# Patient Record
Sex: MALE | Race: BLACK OR AFRICAN AMERICAN | Employment: OTHER | URBAN - METROPOLITAN AREA
[De-identification: names, ages, dates, MRNs, and addresses within clinical notes are randomized per-mention and may not be internally consistent; named-entity substitution may affect disease eponyms.]

---

## 2017-01-03 ENCOUNTER — OFFICE VISIT (OUTPATIENT)
Dept: CARDIOLOGY CLINIC | Age: 57
End: 2017-01-03

## 2017-01-03 ENCOUNTER — NURSE NAVIGATOR (OUTPATIENT)
Dept: CARDIOLOGY CLINIC | Age: 57
End: 2017-01-03

## 2017-01-03 VITALS
OXYGEN SATURATION: 98 % | WEIGHT: 264 LBS | BODY MASS INDEX: 36.96 KG/M2 | SYSTOLIC BLOOD PRESSURE: 138 MMHG | HEART RATE: 68 BPM | DIASTOLIC BLOOD PRESSURE: 80 MMHG | HEIGHT: 71 IN

## 2017-01-03 DIAGNOSIS — I50.42 CHRONIC COMBINED SYSTOLIC AND DIASTOLIC CONGESTIVE HEART FAILURE (HCC): ICD-10-CM

## 2017-01-03 DIAGNOSIS — I10 ESSENTIAL HYPERTENSION: Primary | ICD-10-CM

## 2017-01-03 DIAGNOSIS — I48.19 PERSISTENT ATRIAL FIBRILLATION (HCC): ICD-10-CM

## 2017-01-03 DIAGNOSIS — Z72.0 TOBACCO USE: ICD-10-CM

## 2017-01-03 RX ORDER — CARVEDILOL 25 MG/1
25 TABLET ORAL EVERY 12 HOURS
Qty: 60 TAB | Refills: 11 | Status: SHIPPED | OUTPATIENT
Start: 2017-01-03 | End: 2018-01-15 | Stop reason: SDUPTHER

## 2017-01-03 RX ORDER — FUROSEMIDE 40 MG/1
40 TABLET ORAL DAILY
Qty: 45 TAB | Refills: 8 | Status: SHIPPED | OUTPATIENT
Start: 2017-01-03 | End: 2018-01-15 | Stop reason: SDUPTHER

## 2017-01-03 RX ORDER — LISINOPRIL 10 MG/1
10 TABLET ORAL DAILY
Qty: 30 TAB | Refills: 11 | Status: SHIPPED | OUTPATIENT
Start: 2017-01-03 | End: 2018-01-15 | Stop reason: SDUPTHER

## 2017-01-03 NOTE — PROGRESS NOTES
NNTOCIP    Transitions of Care Follow Up      Mr. Desirae Mullins was hospitalized at [facility] 11/18-11/23/16 for CHF, A fib, HTN and discharged to home. Goals met.  Episode closed:  No hospitalization or ED visit 30 days from discharge on 11/23/16.

## 2017-01-03 NOTE — MR AVS SNAPSHOT
Visit Information Date & Time Provider Department Dept. Phone Encounter #  
 1/3/2017 11:30 AM Hamida Steele NP [facility] [id number] Your Appointments 2/1/2017  9:00 AM  
Follow Up with Sana Farrar MD  
[facility] ([facility]) Appt Note: PH follow-up; hosp in late Nov. 2016 for CHF  
 [facility] [address] [address] [address] [phone] [facility] [address] Upcoming Health Maintenance Date Due Hepatitis C Screening 1960 DTaP/Tdap/Td series (1 - Tdap) 6/4/1980 FOBT Q 1 YEAR AGE 50-75 6/4/2009 INFLUENZA AGE 9 TO ADULT 8/1/2016 Allergies as of 1/3/2017  Review Complete On: 1/3/2017 By: Hamida Steele NP No Known Allergies Current Immunizations  Never Reviewed Name Date Pneumococcal Polysaccharide (PPSV-23) 5/9/2015  1:45 PM  
  
 Not reviewed this visit You Were Diagnosed With   
  
 Codes Comments Essential hypertension    -  Primary ICD-10-CM: I10 
ICD-9-CM: 401.9 Vitals BP Pulse Height(growth percentile) Weight(growth percentile) SpO2 BMI  
 138/80 (BP 1 Location: Left arm, BP Patient Position: Sitting) 68 5' 11\" (1.803 m) 264 lb (119.7 kg) 98% 36.82 kg/m2 Smoking Status Current Every Day Smoker Vitals History BMI and BSA Data Body Mass Index Body Surface Area  
 36.82 kg/m 2 2.45 m 2 Preferred Pharmacy Pharmacy Name Phone [id number] [address] [phone] Your Updated Medication List  
  
   
This list is accurate as of: 1/3/17 12:13 PM.  Always use your most recent med list.  
  
  
  
  
 carvedilol 25 mg tablet Commonly known as:  Alvarado Servant Take 1 Tab by mouth every twelve (12) hours. Indications: Chronic Heart Failure  
  
 furosemide 40 mg tablet Commonly known as:  LASIX Take 1 Tab by mouth daily. Or as directed  
  
 lisinopril 10 mg tablet Commonly known as:  Jaquez Raymond Take 1 Tab by mouth daily. methocarbamol 500 mg tablet Commonly known as:  ROBAXIN Take 1 Tab by mouth four (4) times daily. nitroglycerin 0.4 mg SL tablet Commonly known as:  NITROSTAT  
1 Tab by SubLINGual route every five (5) minutes as needed for Chest Pain. XARELTO 20 mg Tab tablet Generic drug:  rivaroxaban TAKE ONE TABLET BY MOUTH ONCE DAILY Patient Instructions Continue present medication regimen Follow-up with Dr. Sandi Madrid as scheduled and as needed Weigh daily and record Limit sodium intake to 2000mg per day Limit fluid intake to no more than  6, eight ounce glasses of any type of fluids per day Call if you notice sudden or progressive weight gain (3-5 pounds in 2-3 days), increasing shortness of breath, abdominal bloating, increasing lower extremity edema, inability to lie flat or on your normal number of pillows, having to sit up to catch your breath, fatigue, increased somnolence (sleeping more), poor appetite Heart Failure: Care Instructions Your Care Instructions Heart failure occurs when your heart does not pump as much blood as the body needs. Failure does not mean that the heart has stopped pumping but rather that it is not pumping as well as it should. Over time, this causes fluid buildup in your lungs and other parts of your body. Fluid buildup can cause shortness of breath, fatigue, swollen ankles, and other problems. By taking medicines regularly, reducing sodium (salt) in your diet, checking your weight every day, and making lifestyle changes, you can feel better and live longer. Follow-up care is a key part of your treatment and safety. Be sure to make and go to all appointments, and call your doctor if you are having problems.  It's also a good idea to know your test results and keep a list of the medicines you take. How can you care for yourself at home? Medicines · Be safe with medicines. Take your medicines exactly as prescribed. Call your doctor if you think you are having a problem with your medicine. · Do not take any vitamins, over-the-counter medicine, or herbal products without talking to your doctor first. Angela Duff not take ibuprofen (Advil or Motrin) and naproxen (Aleve) without talking to your doctor first. They could make your heart failure worse. · You may be taking some of the following medicine. ¨ Beta-blockers can slow heart rate, decrease blood pressure, and improve your condition. Taking a beta-blocker may lower your chance of needing to be hospitalized. ¨ Angiotensin-converting enzyme inhibitors (ACEIs) reduce the heart's workload, lower blood pressure, and reduce swelling. Taking an ACEI may lower your chance of needing to be hospitalized again. ¨ Angiotensin II receptor blockers (ARBs) work like ACEIs. Your doctor may prescribe them instead of ACEIs. ¨ Diuretics, also called water pills, reduce swelling. ¨ Potassium supplements replace this important mineral, which is sometimes lost with diuretics. ¨ Aspirin and other blood thinners prevent blood clots, which can cause a stroke or heart attack. You will get more details on the specific medicines your doctor prescribes. Diet · Your doctor may suggest that you limit sodium to 2,000 milligrams (mg) a day or less. That is less than 1 teaspoon of salt a day, including all the salt you eat in cooking or in packaged foods. People get most of their sodium from processed foods. Fast food and restaurant meals also tend to be very high in sodium. · Ask your doctor how much liquid you can drink each day. You may have to limit liquids. Weight · Weigh yourself without clothing at the same time each day. Record your weight. Call your doctor if you gain more than 3 pounds in 2 to 3 days.  A sudden weight gain may mean that your heart failure is getting worse. Activity level · Start light exercise (if your doctor says it is okay). Even if you can only do a small amount, exercise will help you get stronger, have more energy, and manage your weight and your stress. Walking is an easy way to get exercise. Start out by walking a little more than you did before. Bit by bit, increase the amount you walk. · When you exercise, watch for signs that your heart is working too hard. You are pushing yourself too hard if you cannot talk while you are exercising. If you become short of breath or dizzy or have chest pain, stop, sit down, and rest. 
· If you feel \"wiped out\" the day after you exercise, walk slower or for a shorter distance until you can work up to a better pace. · Get enough rest at night. Sleeping with 1 or 2 pillows under your upper body and head may help you breathe easier. Lifestyle changes · Do not smoke. Smoking can make a heart condition worse. If you need help quitting, talk to your doctor about stop-smoking programs and medicines. These can increase your chances of quitting for good. Quitting smoking may be the most important step you can take to protect your heart. · Limit alcohol to 2 drinks a day for men and 1 drink a day for women. Too much alcohol can cause health problems. · Avoid getting sick from colds and the flu. Get a pneumococcal vaccine shot. If you have had one before, ask your doctor whether you need another dose. Get a flu shot each year. If you must be around people with colds or the flu, wash your hands often. When should you call for help? Call 911 if you have symptoms of sudden heart failure such as: 
· You have severe trouble breathing. · You cough up pink, foamy mucus. · You have a new irregular or rapid heartbeat. Call your doctor now or seek immediate medical care if: 
· You have new or increased shortness of breath. · You are dizzy or lightheaded, or you feel like you may faint. · You have sudden weight gain, such as 3 pounds or more in 2 to 3 days. · You have increased swelling in your legs, ankles, or feet. · You are suddenly so tired or weak that you cannot do your usual activities. Watch closely for changes in your health, and be sure to contact your doctor if: 
· You develop new symptoms. Where can you learn more? Go to http://aixa-lizet.info/. Enter F337 in the search box to learn more about \"Heart Failure: Care Instructions. \" Current as of: January 27, 2016 Content Version: 11.1 © 9448-3474 [employer]. Care instructions adapted under license by [employer] (which disclaims liability or warranty for this information). If you have questions about a medical condition or this instruction, always ask your healthcare professional. [place] [address] any warranty or liability for your use of this information. Limiting Sodium and Fluids With Heart Failure: Care Instructions Your Care Instructions Sodium causes your body to keep extra water, making it harder for your heart to pump. By limiting sodium, you will feel better and lower your risk of having to go to the hospital. 
People get most of their sodium from processed foods. Fast food and restaurant meals also tend to be very high in sodium. Your doctor may suggest that you limit sodium to 2,000 milligrams (mg) a day or less. That is less than 1 teaspoon of salt a day, including all the salt you eat in cooked or packaged foods. Usually, you have to limit the amount of liquids you drink only if your heart failure is severe. Limiting sodium alone often is enough to help your body get rid of extra fluids. However, your doctor may tell you to limit your fluid intake to a set amount each day. Follow-up care is a key part of your treatment and safety.  Be sure to make and go to all appointments, and call your doctor if you are having problems. It's also a good idea to know your test results and keep a list of the medicines you take. How can you care for yourself at home? Read food labels · Read food labels on cans and food packages. The labels tell you how much sodium is in each serving. Make sure that you look at the serving size. If you eat more than the serving size, you have eaten more sodium than is listed for one serving. · Food labels also tell you the Percent Daily Value. If the Percent Daily Value says 50%, it means that you will get at least 50% of all the sodium you need for the entire day in one serving. Choose products with low Percent Daily Values for sodium. · Be aware that sodium can come in forms other than salt, including monosodium glutamate (MSG), sodium citrate, and sodium bicarbonate (baking soda). MSG is often added to Asian food. You can sometimes ask for food without MSG or salt. Buy low-sodium foods · Buy foods that are labeled \"unsalted\" (no salt added), \"sodium-free\" (less than 5 mg of sodium per serving), or \"low-sodium\" (less than 140 mg of sodium per serving). A food labeled \"light sodium\" has less than half of the full-sodium version of that food. Foods labeled \"reduced-sodium\" may still have too much sodium. · Buy fresh vegetables or plain, frozen vegetables. Buy low-sodium versions of canned vegetables, soups, and other canned goods. Prepare low-sodium meals · Use less salt each day when cooking. Reducing salt in this way will help you adjust to the taste. Do not add salt after cooking. Take the salt shaker off the table. · Flavor your food with garlic, lemon juice, onion, vinegar, herbs, and spices instead of salt. Do not use soy sauce, steak sauce, onion salt, garlic salt, mustard, or ketchup on your food. · Make your own salad dressings, sauces, and ketchup without adding salt. · Use less salt (or none) when recipes call for it. You can often use half the salt a recipe calls for without losing flavor. Other dishes like rice, pasta, and grains do not need added salt. · Rinse canned vegetables. This removes somebut not allof the salt. · Avoid water that has a naturally high sodium content or that has been treated with water softeners, which add sodium. Call your local water company to find out the sodium content of your water supply. If you buy bottled water, read the label and choose a sodium-free brand. Avoid high-sodium foods, such as: 
· Smoked, cured, salted, and canned meat, fish, and poultry. · Ham, dawson, hot dogs, and luncheon meats. · Regular, hard, and processed cheese and regular peanut butter. · Crackers with salted tops. · Frozen prepared meals. · Canned and dried soups, broths, and bouillon, unless labeled sodium-free or low-sodium. · Canned vegetables, unless labeled sodium-free or low-sodium. · Salted snack foods such as chips and pretzels. · Western Lyly fries, pizza, tacos, and other fast foods. · Pickles, olives, ketchup, and other condiments, especially soy sauce, unless labeled sodium-free or low-sodium. If you cannot cook for yourself · Have family members or friends help you, or have someone cook low-sodium meals. · Check with your local senior nutrition program to find out where meals are served and whether they offer a low-sodium option. You can often find these programs through your local health department or hospital. 
· Have meals delivered to your home. Most [facility] have a Meals on [employer]. These programs provide one hot meal a day for older adults, delivered to their homes. Ask whether these meals are low-sodium. Let them know that you are on a low-sodium diet. Limiting fluid intake · Find a method that works for you. You might simply write down how much you drink every time you do.  Some people keep a container filled with the amount of fluid allowed for that day. If they drink from a source other than the container, then they pour out that amount. · Measure your regular drinking glasses to find out how much fluid each one holds. Once you know this, you will not have to measure every time. · Besides water, milk, juices, and other drinks, some foods have a lot of fluid. Count any foods that will melt (such as ice cream or gelatin dessert) or liquid foods (such as soup) as part of your fluid intake for the day. Where can you learn more? Go to http://aixa-lizet.info/. Enter A166 in the search box to learn more about \"Limiting Sodium and Fluids With Heart Failure: Care Instructions. \" Current as of: January 27, 2016 Content Version: 11.1 © 4195-7350 [employer]. Care instructions adapted under license by [employer] (which disclaims liability or warranty for this information). If you have questions about a medical condition or this instruction, always ask your healthcare professional. [place] [address] any warranty or liability for your use of this information. Low Sodium Diet (2,000 Milligram): Care Instructions Your Care Instructions Too much sodium causes your body to hold on to extra water. This can raise your blood pressure and force your heart and kidneys to work harder. In very serious cases, this could cause you to be put in the hospital. It might even be life-threatening. By limiting sodium, you will feel better and lower your risk of serious problems. The most common source of sodium is salt. People get most of the salt in their diet from canned, prepared, and packaged foods. Fast food and restaurant meals also are very high in sodium. Your doctor will probably limit your sodium to less than 2,000 milligrams (mg) a day. This limit counts all the sodium in prepared and packaged foods and any salt you add to your food. Follow-up care is a key part of your treatment and safety. Be sure to make and go to all appointments, and call your doctor if you are having problems. It's also a good idea to know your test results and keep a list of the medicines you take. How can you care for yourself at home? Read food labels · Read labels on cans and food packages. The labels tell you how much sodium is in each serving. Make sure that you look at the serving size. If you eat more than the serving size, you have eaten more sodium. · Food labels also tell you the Percent Daily Value for sodium. Choose products with low Percent Daily Values for sodium. · Be aware that sodium can come in forms other than salt, including monosodium glutamate (MSG), sodium citrate, and sodium bicarbonate (baking soda). MSG is often added to Asian food. When you eat out, you can sometimes ask for food without MSG or added salt. Buy low-sodium foods · Buy foods that are labeled \"unsalted\" (no salt added), \"sodium-free\" (less than 5 mg of sodium per serving), or \"low-sodium\" (less than 140 mg of sodium per serving). Foods labeled \"reduced-sodium\" and \"light sodium\" may still have too much sodium. Be sure to read the label to see how much sodium you are getting. · Buy fresh vegetables, or frozen vegetables without added sauces. Buy low-sodium versions of canned vegetables, soups, and other canned goods. Prepare low-sodium meals · Cut back on the amount of salt you use in cooking. This will help you adjust to the taste. Do not add salt after cooking. One teaspoon of salt has about 2,300 mg of sodium. · Take the salt shaker off the table. · Flavor your food with garlic, lemon juice, onion, vinegar, herbs, and spices. Do not use soy sauce, lite soy sauce, steak sauce, onion salt, garlic salt, celery salt, mustard, or ketchup on your food. · Use low-sodium salad dressings, sauces, and ketchup. Or make your own salad dressings and sauces without adding salt. · Use less salt (or none) when recipes call for it. You can often use half the salt a recipe calls for without losing flavor. Other foods such as rice, pasta, and grains do not need added salt. · Rinse canned vegetables, and cook them in fresh water. This removes somebut not allof the salt. · Avoid water that is naturally high in sodium or that has been treated with water softeners, which add sodium. Call your local water company to find out the sodium content of your water supply. If you buy bottled water, read the label and choose a sodium-free brand. Avoid high-sodium foods · Avoid eating: ¨ Smoked, cured, salted, and canned meat, fish, and poultry. ¨ Ham, dawson, hot dogs, and luncheon meats. ¨ Regular, hard, and processed cheese and regular peanut butter. ¨ Crackers with salted tops, and other salted snack foods such as pretzels, chips, and salted popcorn. ¨ Frozen prepared meals, unless labeled low-sodium. ¨ Canned and dried soups, broths, and bouillon, unless labeled sodium-free or low-sodium. ¨ Canned vegetables, unless labeled sodium-free or low-sodium. ¨ Western Lyly fries, pizza, tacos, and other fast foods. ¨ Pickles, olives, ketchup, and other condiments, especially soy sauce, unless labeled sodium-free or low-sodium. Where can you learn more? Go to http://aixa-lizet.info/. Enter H801 in the search box to learn more about \"Low Sodium Diet (2,000 Milligram): Care Instructions. \" Current as of: July 26, 2016 Content Version: 11.1 © 4235-7136 [employer]. Care instructions adapted under license by Stroz Friedberg (which disclaims liability or warranty for this information). If you have questions about a medical condition or this instruction, always ask your healthcare professional. Loly Pelayo any warranty or liability for your use of this information. Introducing [facility]! Marlene Baltazar introduces Digital Lifeboat patient portal. Now you can access parts of your medical record, email your doctor's office, and request medication refills online. 1. In your internet browser, go to [email or web address] 2. Click on the First Time User? Click Here link in the Sign In box. You will see the New Member Sign Up page. 3. Enter your Digital Lifeboat Access Code exactly as it appears below. You will not need to use this code after youve completed the sign-up process. If you do not sign up before the expiration date, you must request a new code. · Digital Lifeboat Access Code: [email or web address] Expires: 1/25/2017 12:49 PM 
 
4. Enter the last four digits of your Social Security Number (xxxx) and Date of Birth (mm/dd/yyyy) as indicated and click Submit. You will be taken to the next sign-up page. 5. Create a Digital Lifeboat ID. This will be your Digital Lifeboat login ID and cannot be changed, so think of one that is secure and easy to remember. 6. Create a Digital Lifeboat password. You can change your password at any time. 7. Enter your Password Reset Question and Answer. This can be used at a later time if you forget your password. 8. Enter your e-mail address. You will receive e-mail notification when new information is available in [address] 9. Click Sign Up. You can now view and download portions of your medical record. 10. Click the Download Summary menu link to download a portable copy of your medical information. If you have questions, please visit the Frequently Asked Questions section of the Digital Lifeboat website. Remember, Digital Lifeboat is NOT to be used for urgent needs. For medical emergencies, dial 911. Now available from your iPhone and Android! Please provide this summary of care documentation to your next provider. Your primary care clinician is listed as Nilam Martino. If you have any questions after today's visit, please call [phone].

## 2017-01-03 NOTE — PROGRESS NOTES
1. Have you been to the ER, urgent care clinic since your last visit? Hospitalized since your last visit? Yes, on 11/23/16 at [facility]    2. Have you seen or consulted any other health care providers outside of the [address] [place] [address] since your last visit? Include any pap smears or colon screening.  No

## 2017-01-03 NOTE — PATIENT INSTRUCTIONS
Continue present medication regimen  Follow-up with Dr. Haris Gonzalez as scheduled and as needed  Weigh daily and record  Limit sodium intake to 2000mg per day  Limit fluid intake to no more than  6, eight ounce glasses of any type of fluids per day  Call if you notice sudden or progressive weight gain (3-5 pounds in 2-3 days), increasing shortness of breath, abdominal bloating, increasing lower extremity edema, inability to lie flat or on your normal number of pillows, having to sit up to catch your breath, fatigue, increased somnolence (sleeping more), poor appetite      Heart Failure: Care Instructions  Your Care Instructions    Heart failure occurs when your heart does not pump as much blood as the body needs. Failure does not mean that the heart has stopped pumping but rather that it is not pumping as well as it should. Over time, this causes fluid buildup in your lungs and other parts of your body. Fluid buildup can cause shortness of breath, fatigue, swollen ankles, and other problems. By taking medicines regularly, reducing sodium (salt) in your diet, checking your weight every day, and making lifestyle changes, you can feel better and live longer. Follow-up care is a key part of your treatment and safety. Be sure to make and go to all appointments, and call your doctor if you are having problems. It's also a good idea to know your test results and keep a list of the medicines you take. How can you care for yourself at home? Medicines  · Be safe with medicines. Take your medicines exactly as prescribed. Call your doctor if you think you are having a problem with your medicine. · Do not take any vitamins, over-the-counter medicine, or herbal products without talking to your doctor first. Cyrena Hedge not take ibuprofen (Advil or Motrin) and naproxen (Aleve) without talking to your doctor first. They could make your heart failure worse. · You may be taking some of the following medicine.   ¨ Beta-blockers can slow heart rate, decrease blood pressure, and improve your condition. Taking a beta-blocker may lower your chance of needing to be hospitalized. ¨ Angiotensin-converting enzyme inhibitors (ACEIs) reduce the heart's workload, lower blood pressure, and reduce swelling. Taking an ACEI may lower your chance of needing to be hospitalized again. ¨ Angiotensin II receptor blockers (ARBs) work like ACEIs. Your doctor may prescribe them instead of ACEIs. ¨ Diuretics, also called water pills, reduce swelling. ¨ Potassium supplements replace this important mineral, which is sometimes lost with diuretics. ¨ Aspirin and other blood thinners prevent blood clots, which can cause a stroke or heart attack. You will get more details on the specific medicines your doctor prescribes. Diet  · Your doctor may suggest that you limit sodium to 2,000 milligrams (mg) a day or less. That is less than 1 teaspoon of salt a day, including all the salt you eat in cooking or in packaged foods. People get most of their sodium from processed foods. Fast food and restaurant meals also tend to be very high in sodium. · Ask your doctor how much liquid you can drink each day. You may have to limit liquids. Weight  · Weigh yourself without clothing at the same time each day. Record your weight. Call your doctor if you gain more than 3 pounds in 2 to 3 days. A sudden weight gain may mean that your heart failure is getting worse. Activity level  · Start light exercise (if your doctor says it is okay). Even if you can only do a small amount, exercise will help you get stronger, have more energy, and manage your weight and your stress. Walking is an easy way to get exercise. Start out by walking a little more than you did before. Bit by bit, increase the amount you walk. · When you exercise, watch for signs that your heart is working too hard. You are pushing yourself too hard if you cannot talk while you are exercising.  If you become short of breath or dizzy or have chest pain, stop, sit down, and rest.  · If you feel \"wiped out\" the day after you exercise, walk slower or for a shorter distance until you can work up to a better pace. · Get enough rest at night. Sleeping with 1 or 2 pillows under your upper body and head may help you breathe easier. Lifestyle changes  · Do not smoke. Smoking can make a heart condition worse. If you need help quitting, talk to your doctor about stop-smoking programs and medicines. These can increase your chances of quitting for good. Quitting smoking may be the most important step you can take to protect your heart. · Limit alcohol to 2 drinks a day for men and 1 drink a day for women. Too much alcohol can cause health problems. · Avoid getting sick from colds and the flu. Get a pneumococcal vaccine shot. If you have had one before, ask your doctor whether you need another dose. Get a flu shot each year. If you must be around people with colds or the flu, wash your hands often. When should you call for help? Call 911 if you have symptoms of sudden heart failure such as:  · You have severe trouble breathing. · You cough up pink, foamy mucus. · You have a new irregular or rapid heartbeat. Call your doctor now or seek immediate medical care if:  · You have new or increased shortness of breath. · You are dizzy or lightheaded, or you feel like you may faint. · You have sudden weight gain, such as 3 pounds or more in 2 to 3 days. · You have increased swelling in your legs, ankles, or feet. · You are suddenly so tired or weak that you cannot do your usual activities. Watch closely for changes in your health, and be sure to contact your doctor if:  · You develop new symptoms. Where can you learn more? Go to http://aixa-lizet.info/. Enter B192 in the search box to learn more about \"Heart Failure: Care Instructions. \"  Current as of: January 27, 2016  Content Version: 11.1  © 7532-4784 [employer]. Care instructions adapted under license by [employer] (which disclaims liability or warranty for this information). If you have questions about a medical condition or this instruction, always ask your healthcare professional. [address] any warranty or liability for your use of this information. Limiting Sodium and Fluids With Heart Failure: Care Instructions  Your Care Instructions  Sodium causes your body to keep extra water, making it harder for your heart to pump. By limiting sodium, you will feel better and lower your risk of having to go to the hospital.  People get most of their sodium from processed foods. Fast food and restaurant meals also tend to be very high in sodium. Your doctor may suggest that you limit sodium to 2,000 milligrams (mg) a day or less. That is less than 1 teaspoon of salt a day, including all the salt you eat in cooked or packaged foods. Usually, you have to limit the amount of liquids you drink only if your heart failure is severe. Limiting sodium alone often is enough to help your body get rid of extra fluids. However, your doctor may tell you to limit your fluid intake to a set amount each day. Follow-up care is a key part of your treatment and safety. Be sure to make and go to all appointments, and call your doctor if you are having problems. It's also a good idea to know your test results and keep a list of the medicines you take. How can you care for yourself at home? Read food labels  · Read food labels on cans and food packages. The labels tell you how much sodium is in each serving. Make sure that you look at the serving size. If you eat more than the serving size, you have eaten more sodium than is listed for one serving. · Food labels also tell you the Percent Daily Value. If the Percent Daily Value says 50%, it means that you will get at least 50% of all the sodium you need for the entire day in one serving.  Choose products with low Percent Daily Values for sodium. · Be aware that sodium can come in forms other than salt, including monosodium glutamate (MSG), sodium citrate, and sodium bicarbonate (baking soda). MSG is often added to Asian food. You can sometimes ask for food without MSG or salt. Buy low-sodium foods  · Buy foods that are labeled \"unsalted\" (no salt added), \"sodium-free\" (less than 5 mg of sodium per serving), or \"low-sodium\" (less than 140 mg of sodium per serving). A food labeled \"light sodium\" has less than half of the full-sodium version of that food. Foods labeled \"reduced-sodium\" may still have too much sodium. · Buy fresh vegetables or plain, frozen vegetables. Buy low-sodium versions of canned vegetables, soups, and other canned goods. Prepare low-sodium meals  · Use less salt each day when cooking. Reducing salt in this way will help you adjust to the taste. Do not add salt after cooking. Take the salt shaker off the table. · Flavor your food with garlic, lemon juice, onion, vinegar, herbs, and spices instead of salt. Do not use soy sauce, steak sauce, onion salt, garlic salt, mustard, or ketchup on your food. · Make your own salad dressings, sauces, and ketchup without adding salt. · Use less salt (or none) when recipes call for it. You can often use half the salt a recipe calls for without losing flavor. Other dishes like rice, pasta, and grains do not need added salt. · Rinse canned vegetables. This removes somebut not allof the salt. · Avoid water that has a naturally high sodium content or that has been treated with water softeners, which add sodium. Call your local water company to find out the sodium content of your water supply. If you buy bottled water, read the label and choose a sodium-free brand. Avoid high-sodium foods, such as:  · Smoked, cured, salted, and canned meat, fish, and poultry. · Ham, dawson, hot dogs, and luncheon meats.   · Regular, hard, and processed cheese and regular peanut butter. · Crackers with salted tops. · Frozen prepared meals. · Canned and dried soups, broths, and bouillon, unless labeled sodium-free or low-sodium. · Canned vegetables, unless labeled sodium-free or low-sodium. · Salted snack foods such as chips and pretzels. · Western [place] fries, pizza, tacos, and other fast foods. · Pickles, olives, ketchup, and other condiments, especially soy sauce, unless labeled sodium-free or low-sodium. If you cannot cook for yourself  · Have family members or friends help you, or have someone cook low-sodium meals. · Check with your local senior nutrition program to find out where meals are served and whether they offer a low-sodium option. You can often find these programs through your local health department or hospital.  · Have meals delivered to your home. Most [facility] have a [facility] [place]. These programs provide one hot meal a day for older adults, delivered to their homes. Ask whether these meals are low-sodium. Let them know that you are on a low-sodium diet. Limiting fluid intake  · Find a method that works for you. You might simply write down how much you drink every time you do. Some people keep a container filled with the amount of fluid allowed for that day. If they drink from a source other than the container, then they pour out that amount. · Measure your regular drinking glasses to find out how much fluid each one holds. Once you know this, you will not have to measure every time. · Besides water, milk, juices, and other drinks, some foods have a lot of fluid. Count any foods that will melt (such as ice cream or gelatin dessert) or liquid foods (such as soup) as part of your fluid intake for the day. Where can you learn more? Go to http://aixa-lizet.info/. Enter A166 in the search box to learn more about \"Limiting Sodium and Fluids With Heart Failure: Care Instructions. \"  Current as of: January 27, 2016  Content Version: 11.1  © 5980-2192 [email or web address]. Care instructions adapted under license by [employer] (which disclaims liability or warranty for this information). If you have questions about a medical condition or this instruction, always ask your healthcare professional. [address] any warranty or liability for your use of this information. Low Sodium Diet (2,000 Milligram): Care Instructions  Your Care Instructions  Too much sodium causes your body to hold on to extra water. This can raise your blood pressure and force your heart and kidneys to work harder. In very serious cases, this could cause you to be put in the hospital. It might even be life-threatening. By limiting sodium, you will feel better and lower your risk of serious problems. The most common source of sodium is salt. People get most of the salt in their diet from canned, prepared, and packaged foods. Fast food and restaurant meals also are very high in sodium. Your doctor will probably limit your sodium to less than 2,000 milligrams (mg) a day. This limit counts all the sodium in prepared and packaged foods and any salt you add to your food. Follow-up care is a key part of your treatment and safety. Be sure to make and go to all appointments, and call your doctor if you are having problems. It's also a good idea to know your test results and keep a list of the medicines you take. How can you care for yourself at home? Read food labels  · Read labels on cans and food packages. The labels tell you how much sodium is in each serving. Make sure that you look at the serving size. If you eat more than the serving size, you have eaten more sodium. · Food labels also tell you the Percent Daily Value for sodium. Choose products with low Percent Daily Values for sodium.   · Be aware that sodium can come in forms other than salt, including monosodium glutamate (MSG), sodium citrate, and sodium bicarbonate (baking soda). MSG is often added to Asian food. When you eat out, you can sometimes ask for food without MSG or added salt. Buy low-sodium foods  · Buy foods that are labeled \"unsalted\" (no salt added), \"sodium-free\" (less than 5 mg of sodium per serving), or \"low-sodium\" (less than 140 mg of sodium per serving). Foods labeled \"reduced-sodium\" and \"light sodium\" may still have too much sodium. Be sure to read the label to see how much sodium you are getting. · Buy fresh vegetables, or frozen vegetables without added sauces. Buy low-sodium versions of canned vegetables, soups, and other canned goods. Prepare low-sodium meals  · Cut back on the amount of salt you use in cooking. This will help you adjust to the taste. Do not add salt after cooking. One teaspoon of salt has about 2,300 mg of sodium. · Take the salt shaker off the table. · Flavor your food with garlic, lemon juice, onion, vinegar, herbs, and spices. Do not use soy sauce, lite soy sauce, steak sauce, onion salt, garlic salt, celery salt, mustard, or ketchup on your food. · Use low-sodium salad dressings, sauces, and ketchup. Or make your own salad dressings and sauces without adding salt. · Use less salt (or none) when recipes call for it. You can often use half the salt a recipe calls for without losing flavor. Other foods such as rice, pasta, and grains do not need added salt. · Rinse canned vegetables, and cook them in fresh water. This removes somebut not allof the salt. · Avoid water that is naturally high in sodium or that has been treated with water softeners, which add sodium. Call your local water company to find out the sodium content of your water supply. If you buy bottled water, read the label and choose a sodium-free brand. Avoid high-sodium foods  · Avoid eating:  ¨ Smoked, cured, salted, and canned meat, fish, and poultry. ¨ Ham, dawson, hot dogs, and luncheon meats.   ¨ Regular, hard, and processed cheese and regular peanut butter. ¨ Crackers with salted tops, and other salted snack foods such as pretzels, chips, and salted popcorn. ¨ Frozen prepared meals, unless labeled low-sodium. ¨ Canned and dried soups, broths, and bouillon, unless labeled sodium-free or low-sodium. ¨ Canned vegetables, unless labeled sodium-free or low-sodium. ¨ Western Lyly fries, pizza, tacos, and other fast foods. ¨ Pickles, olives, ketchup, and other condiments, especially soy sauce, unless labeled sodium-free or low-sodium. Where can you learn more? Go to http://aixa-lizet.info/. Enter Q532 in the search box to learn more about \"Low Sodium Diet (2,000 Milligram): Care Instructions. \"  Current as of: July 26, 2016  Content Version: 11.1  © 5678-9115 [employer]. Care instructions adapted under license by [employer] (which disclaims liability or warranty for this information). If you have questions about a medical condition or this instruction, always ask your healthcare professional. [place] [address] any warranty or liability for your use of this information.

## 2017-01-03 NOTE — PROGRESS NOTES
Viri Mckeon presents today for post hospital follow-up of CHF. He was admitted after presenting with complaints of chest pain and shortness of breath that has been occurring for about 4 weeks prior to admission. He also complained of orthopnea and PND. During this most recent hospitalization, there w were concerns regarding compliance with his diet and his medications. He has not been a candidate for Coumadin because of the compliance issues. He is a 61year old male with history of hypertension, CAD (s/p cath in 2008, showing non-critical disease of the left circumflex), tobacco abuse,chronic atrial fibrillation, diastolic heart failure, and DJD with bilateral hip replacements . He also has obstructive sleep apnea for which he uses CPAP. He also admits to daily marijuana use. He continues to smoke an occasional cigar and he states that he has not been drinking any alcohol. He denies chest pain, tightness, heaviness, and palpitations. He admits to sharp shooting chest pain that lasts for seconds and this occurs intermittently throughout the day. It does not radiate nor is it accompanied by shortness of breath, nausea, or diaphoresis. He denies shortness of breath at rest, admits to dyspnea on exertion, orthopnea and admits to occasional PND. He denies abdominal bloating. He denies lightheadedness, dizziness, and syncope. He denies lower extremity edema and claudication. Denies nausea, vomiting, diarrhea, fever, chills. He states that he has been compliant with his medication regimen since discharge from the hospital but admits that he has been drinking lots of fluids. PMH:  Past Medical History   Diagnosis Date    Atrial fibrillation ([address] 5/6/15     LV EF 50% (may 7998)    Diastolic HF (heart failure) ([facility])      LV EF 50% (echo may 2015)    History of echocardiogram 05/07/2015     LVE. EF 50%. No WMA. Mod conc LVH. Indeterminate diastolic fx. Mild RVE.   RVSP at least 37 mmHg.  Severe LAE. Mod ROSEMARIE. Mod MR. Marked AV calcification vs vegetation. Mod TR.  Hypertension     Tobacco use        PSH:  Past Surgical History   Procedure Laterality Date    Hx hip replacement Bilateral      right in March 2013, left May 2013    Hx hernia repair         MEDS:  Current Outpatient Prescriptions   Medication Sig    carvedilol (COREG) 25 mg tablet Take 1 Tab by mouth every twelve (12) hours. Indications: Chronic Heart Failure    rivaroxaban (XARELTO) 20 mg tab tablet TAKE ONE TABLET BY MOUTH ONCE DAILY    furosemide (LASIX) 40 mg tablet Take 1 Tab by mouth daily. Or as directed    lisinopril (PRINIVIL, ZESTRIL) 10 mg tablet Take 1 Tab by mouth daily.  methocarbamol (ROBAXIN) 500 mg tablet Take 1 Tab by mouth four (4) times daily.  nitroglycerin (NITROSTAT) 0.4 mg SL tablet 1 Tab by SubLINGual route every five (5) minutes as needed for Chest Pain. No current facility-administered medications for this visit. Allergies and Sensitivities:  No Known Allergies    Family History:  Family History   Problem Relation Age of Onset    Heart Failure Mother     Obesity Mother     Diabetes Father        Social History:  He  reports that he has been smoking. He has a 20.00 pack-year smoking history. He does not have any smokeless tobacco history on file. He  reports that he does not drink alcohol. Physical:  Visit Vitals    /80 (BP 1 Location: Left arm, BP Patient Position: Sitting)    Pulse 68    Ht 5' 11\" (1.803 m)    Wt 119.7 kg (264 lb)    SpO2 98%    BMI 36.82 kg/m2         Exam:  Neck:  Supple, no JVD, no carotid bruits  CV:  Normal S1 and  S2, grade II/VI ALEJANDRA noted, no rubs, or gallops noted. Irregularly, irregular rhythm. Lungs:  Clear to ausculation throughout, no wheezes or rales  Abd:  Soft, non-tender, non-distended with good bowel sounds.   No hepatosplenomegaly  Extremities:  No edema      Data:  EKG:  Atrial fibrillation w/controlled ventricular response around 70. Nonspecific inferior & high lateral ST-T changes      LABS:  Lab Results   Component Value Date/Time    Sodium 141 11/23/2016 11:59 AM    Potassium 4.8 11/23/2016 11:59 AM    Chloride 105 11/23/2016 11:59 AM    CO2 30 11/23/2016 11:59 AM    Glucose 93 11/23/2016 11:59 AM    BUN 16 11/23/2016 11:59 AM    Creatinine 0.93 11/23/2016 11:59 AM     Lab Results   Component Value Date/Time    Cholesterol, total 128 05/06/2015 07:52 AM    HDL Cholesterol 35 05/06/2015 07:52 AM    LDL, calculated 73.2 05/06/2015 07:52 AM    Triglyceride 99 05/06/2015 07:52 AM    CHOL/HDL Ratio 3.7 05/06/2015 07:52 AM     Lab Results   Component Value Date/Time    ALT 29 11/18/2016 12:40 PM       Impression / Plan:  1. Hypertension, blood pressure stable  2. Atrial fibrillation, rate controlled and currently anticoagulated with Xarelto  3. Chronic systolic and diastolic CHF, appears compensated. EF by echo was 45-50%  4. CAD, history of abnormal stress tests and cath in 2008 showed non-critical disease of the circumflex  5. Tobacco abuse and recreational drug use (marijuana)    Mr. Dmitry Plaza was seen today for follow-up after he was hospitalized from November 18-23, 2016. He was diagnosed with acute on chronic systolic and diastolic heart failure secondary to noncompliance with diet and medications. He apparently had run out of his medications and he also was not taking his anticoagulation. His NT pro-BNP on admission was 3407. He was given Lasix and restarted on his medications and improvement was noted. He states that since being discharged, he has been compliant with his medication regimen. However, he has not been compliant with fluid restriction. He states that he was told that he could have up to 64 ounces per day but I asked that he decrease this to 48-56 ounces per day. His blood pressure stable as is his heart rate.   He remains in atrial fibrillation and he states that he has been taking his Xarelto. His breath sounds are clear and he does not exhibit any signs of volume overload at this time. He asked if there is another anticoagulant that is not as expensive and we discussed Coumadin therapy. He is aware that if Coumadin is restarted, he would have to come to the office to have his INRs checked and he is also aware that he would have to be more careful with his dietary intake of vitamin K rich foods. After we discussed this, he decided that he would rather stay on the Xarelto and he was given a savings card that he was asked to try to activate to see if he can get assistance with the monetary cost of the medication. An echo done during this admission showed an ejection fraction of 45-50%, no obvious wall motion abnormalities, and RVSP was mildly elevated at 36 mmHg. Also noted was moderate MR. When compared to the echo done in May 2015, there is no significant change. CT scan of the chest was done and it showed no overt pulmonary edema. CTA of the chest was also done which showed the possibility of 2 or 3 small caliber nonocclusive PE. However, it may have also been artifactual.    Congestive heart failure teaching reinforced today. Advised to limit sodium intake to no more than 2000mg per day and to also watch fluid intake. Advised to weigh daily every morning and record weights. Instructed to call our office if progressive weight gain is noted over a 2 to 3 day period of time, shortness of breath increases, or if abdominal bloating, nausea, fatigue, or increased lower extremity edema is noted. Patient education material regarding CHF, low-sodium diet, and sodium and fluid restrictions attached his after visit summary. The importance of compliance with his medication regimen and dietary and fluid restrictions discussed at length. He verbalizes understanding of the discussion. Smoking cessation discussed and highly encouraged.   He was also encouraged to discontinue use of marijuana. He states that he has not had any problems with over indulging with alcohol and he states that he has not had any alcohol since he was discharged home. He will follow-up with Dr. Ajith Ge as scheduled and as needed.         Martínez JAMESON, FNP-BC

## 2017-01-18 ENCOUNTER — TELEPHONE (OUTPATIENT)
Dept: CARDIAC REHAB | Age: 57
End: 2017-01-18

## 2017-01-18 NOTE — TELEPHONE ENCOUNTER
[facility] called and left a message on patients voicemail. Additional attempts to contact patient will be made.     Thank you,  Elieser Perales

## 2017-02-02 ENCOUNTER — OFFICE VISIT (OUTPATIENT)
Dept: CARDIOLOGY CLINIC | Age: 57
End: 2017-02-02

## 2017-02-02 VITALS
SYSTOLIC BLOOD PRESSURE: 130 MMHG | HEART RATE: 72 BPM | DIASTOLIC BLOOD PRESSURE: 40 MMHG | HEIGHT: 71 IN | BODY MASS INDEX: 38.22 KG/M2 | OXYGEN SATURATION: 86 % | WEIGHT: 273 LBS

## 2017-02-02 DIAGNOSIS — I48.19 PERSISTENT ATRIAL FIBRILLATION (HCC): Primary | ICD-10-CM

## 2017-02-02 DIAGNOSIS — I50.9 CONGESTIVE HEART FAILURE, UNSPECIFIED CONGESTIVE HEART FAILURE CHRONICITY, UNSPECIFIED CONGESTIVE HEART FAILURE TYPE: ICD-10-CM

## 2017-02-02 NOTE — PROGRESS NOTES
PATIENT NAME: Renetta Rossi         61 y.o.      1960              DATE:2/2/2017    REASON FOR VISIT: Congestive heart failure    HISTORY OF PRESENT ILLNESS: Carrying the diagnosis of congestive heart failure mainly on the basis of diastolic CHF. His ejection fraction however is at the lower limits of normal.  The patient is in atrial fibrillation chronically. Has a history of nonobstructive coronary artery disease. Has a history of poor compliance with his medical regimen. Coumadin contraindicated because of compliance issues. The patient was not able to afford Xarelto. He is presently taking aspirin 81 mg daily. Chronically short of breath on exertion. This has not changed. Denies orthopnea and paroxysmal nocturnal dyspnea. Denies chest pain. Denies syncope, presyncope. Denies palpitation. Denies edema in the lower extremities. PAST MEDICAL HISTORY:   Past Medical History:    Atrial fibrillation ([address]                       5/6/15          Comment:LV EF 50% (may 2864)    Diastolic HF (heart failure) ([facility])                              Comment:LV EF 50% (echo may 2015)    History of echocardiogram                       05/07/2015      Comment:LVE. EF 50%. No WMA. Mod conc LVH. Indeterminate diastolic fx. Mild RVE. RVSP at               least 37 mmHg. Severe LAE. Mod ROSEMARIE. Mod MR. Marked AV calcification vs vegetation. Mod TR.     Hypertension                                                  Tobacco use                                                   PAST SURGICAL HISTORY:   Past Surgical History:    HX HIP REPLACEMENT                              Bilateral                 Comment:right in March 2013, left May 2013    HX HERNIA REPAIR                                                 SOCIAL HISTORY:  Social History    Marital status: UNKNOWN             Spouse name:                       Years of education:                 Number of children: Social History Main Topics    Smoking status: Current Every Day Smoker                                                     Packs/day: 0.50      Years: 40.00       Comment: trying to quit    Alcohol use: No              Drug use: Yes                Special: Marijuana       Comment: daily      ALLERGIES:   No Known Allergies     CURRENT MEDICATIONS:   Current Outpatient Prescriptions:  carvedilol (COREG) 25 mg tablet, Take 1 Tab by mouth every twelve (12) hours. Indications: Chronic Heart Failure  furosemide (LASIX) 40 mg tablet, Take 1 Tab by mouth daily. Or as directed  lisinopril (PRINIVIL, ZESTRIL) 10 mg tablet, Take 1 Tab by mouth daily. methocarbamol (ROBAXIN) 500 mg tablet, Take 1 Tab by mouth four (4) times daily. nitroglycerin (NITROSTAT) 0.4 mg SL tablet, 1 Tab by SubLINGual route every five (5) minutes as needed for Chest Pain. No current facility-administered medications for this visit. REVIEW of SYSTEMS:History obtained from chart review and the patient  General ROS: 9 pound weight gain since last visit  Hematological and Lymphatic ROS: negative for - bleeding problems  Respiratory ROS: Stable shortness of breath on exertion. Obstructive sleep apnea. Uses CPAP.   Cardiovascular ROS: Please see history of present illness  Gastrointestinal ROS: no abdominal pain, change in bowel habits, or black or bloody stools  Neurological ROS: no TIA or stroke symptoms     PHYSICAL EXAMINATION:   /40 (BP 1 Location: Left arm, BP Patient Position: Sitting)  Pulse 72  Ht 5' 11\" (1.803 m)  Wt 123.8 kg (273 lb)  SpO2 (!) 86%  BMI 38.08 kg/m2  BP Readings from Last 3 Encounters:  02/02/17 : 130/40  01/03/17 : 138/80  11/23/16 : 130/83    Pulse Readings from Last 3 Encounters:  02/02/17 : 72  01/03/17 : 68  11/23/16 : 76    Wt Readings from Last 3 Encounters:  02/02/17 : 123.8 kg (273 lb)  01/03/17 : 119.7 kg (264 lb)  11/23/16 : 119.1 kg (262 lb 9.6 oz)    General: Obese -American male no apparent distress. Neck: Unable to evaluate neck veins. Chest: Clear to auscultation. Heart: PMI not palpable. Irregular rhythm. No murmur or gallop audible. Abdomen: Obese. Unable to palpate abdominal aorta. Extremities: No edema. Dorsalis pedis and posterior tibial pulses intact skin: Warm and dry. No stasis changes. Neurologic: Alert, oriented. No facial asymmetry. Speech within normal limits. Motor deferred. EKG: Atrial fibrillation. IMPRESSION:   Atrial fibrillation, chronic, adequate rate control  Chronic diastolic congestive heart failure, compensated  Hypertension, controlled  Coronary artery disease, nonobstructive as of last catheterization  History of poor compliance with medical regimen. The patient states that he has been compliant recently. PLAN:  Basic metabolic panel  Medications reviewed. No changes made. Return to office in 3 months      The diagnoses and plan were discussed with patient. All questions answered. Plan of care agreed to by all concerned. Albert Valle.  MD Nia       ,

## 2017-02-02 NOTE — PROGRESS NOTES
1. Have you been to the ER, urgent care clinic since your last visit? Hospitalized since your last visit? No    2. Have you seen or consulted any other health care providers outside of the [address] [place] [address] since your last visit? Include any pap smears or colon screening.  No     Verbal order and read back per Barbara Wong MD

## 2018-01-15 RX ORDER — CARVEDILOL 25 MG/1
25 TABLET ORAL EVERY 12 HOURS
Qty: 60 TAB | Refills: 11 | Status: ON HOLD | OUTPATIENT
Start: 2018-01-15 | End: 2018-02-08

## 2018-01-15 RX ORDER — FUROSEMIDE 40 MG/1
40 TABLET ORAL DAILY
Qty: 30 TAB | Refills: 11 | Status: ON HOLD | OUTPATIENT
Start: 2018-01-15 | End: 2018-02-08

## 2018-01-15 RX ORDER — LISINOPRIL 10 MG/1
10 TABLET ORAL DAILY
Qty: 30 TAB | Refills: 11 | Status: ON HOLD | OUTPATIENT
Start: 2018-01-15 | End: 2018-02-08

## 2018-02-05 ENCOUNTER — HOSPITAL ENCOUNTER (INPATIENT)
Age: 58
LOS: 3 days | Discharge: HOME | DRG: 293 | End: 2018-02-08
Attending: EMERGENCY MEDICINE | Admitting: INTERNAL MEDICINE
Payer: MEDICARE

## 2018-02-05 ENCOUNTER — APPOINTMENT (OUTPATIENT)
Dept: GENERAL RADIOLOGY | Age: 58
DRG: 293 | End: 2018-02-05
Attending: EMERGENCY MEDICINE
Payer: MEDICARE

## 2018-02-05 DIAGNOSIS — I50.9 ACUTE ON CHRONIC CONGESTIVE HEART FAILURE, UNSPECIFIED CONGESTIVE HEART FAILURE TYPE: Primary | ICD-10-CM

## 2018-02-05 LAB
ALBUMIN SERPL-MCNC: 3.9 G/DL (ref 3.4–5)
ALBUMIN/GLOB SERPL: 1 {RATIO} (ref 0.8–1.7)
ALP SERPL-CCNC: 96 U/L (ref 45–117)
ALT SERPL-CCNC: 47 U/L (ref 16–61)
ANION GAP SERPL CALC-SCNC: 5 MMOL/L (ref 3–18)
ARTERIAL PATENCY WRIST A: YES
AST SERPL-CCNC: 37 U/L (ref 15–37)
BASE EXCESS BLD CALC-SCNC: 2 MMOL/L
BASOPHILS # BLD: 0 K/UL (ref 0–0.06)
BASOPHILS NFR BLD: 0 % (ref 0–2)
BDY SITE: ABNORMAL
BILIRUB SERPL-MCNC: 1.1 MG/DL (ref 0.2–1)
BNP SERPL-MCNC: 3084 PG/ML (ref 0–900)
BUN SERPL-MCNC: 13 MG/DL (ref 7–18)
BUN/CREAT SERPL: 15 (ref 12–20)
CALCIUM SERPL-MCNC: 9.9 MG/DL (ref 8.5–10.1)
CHLORIDE SERPL-SCNC: 110 MMOL/L (ref 100–108)
CK MB CFR SERPL CALC: 2.3 % (ref 0–4)
CK MB SERPL-MCNC: 2.4 NG/ML (ref 5–25)
CK SERPL-CCNC: 104 U/L (ref 39–308)
CO2 SERPL-SCNC: 30 MMOL/L (ref 21–32)
CREAT SERPL-MCNC: 0.87 MG/DL (ref 0.6–1.3)
DIFFERENTIAL METHOD BLD: ABNORMAL
EOSINOPHIL # BLD: 0.1 K/UL (ref 0–0.4)
EOSINOPHIL NFR BLD: 1 % (ref 0–5)
ERYTHROCYTE [DISTWIDTH] IN BLOOD BY AUTOMATED COUNT: 16.5 % (ref 11.6–14.5)
FLUAV AG NPH QL IA: NEGATIVE
FLUBV AG NOSE QL IA: NEGATIVE
GAS FLOW.O2 O2 DELIVERY SYS: ABNORMAL L/MIN
GAS FLOW.O2 SETTING OXYMISER: 3 L/M
GLOBULIN SER CALC-MCNC: 4.1 G/DL (ref 2–4)
GLUCOSE SERPL-MCNC: 98 MG/DL (ref 74–99)
HCO3 BLD-SCNC: 27.8 MMOL/L (ref 22–26)
HCT VFR BLD AUTO: 42.2 % (ref 36–48)
HGB BLD-MCNC: 14.3 G/DL (ref 13–16)
LYMPHOCYTES # BLD: 1.8 K/UL (ref 0.9–3.6)
LYMPHOCYTES NFR BLD: 25 % (ref 21–52)
MCH RBC QN AUTO: 28.7 PG (ref 24–34)
MCHC RBC AUTO-ENTMCNC: 33.9 G/DL (ref 31–37)
MCV RBC AUTO: 84.6 FL (ref 74–97)
MONOCYTES # BLD: 0.6 K/UL (ref 0.05–1.2)
MONOCYTES NFR BLD: 8 % (ref 3–10)
NEUTS SEG # BLD: 4.8 K/UL (ref 1.8–8)
NEUTS SEG NFR BLD: 66 % (ref 40–73)
PCO2 BLD: 45.7 MMHG (ref 35–45)
PH BLD: 7.39 [PH] (ref 7.35–7.45)
PLATELET # BLD AUTO: 117 K/UL (ref 135–420)
PLATELET COMMENTS,PCOM: ABNORMAL
PO2 BLD: 74 MMHG (ref 80–100)
POTASSIUM SERPL-SCNC: 4 MMOL/L (ref 3.5–5.5)
PROT SERPL-MCNC: 8 G/DL (ref 6.4–8.2)
RBC # BLD AUTO: 4.99 M/UL (ref 4.7–5.5)
RBC MORPH BLD: ABNORMAL
SAO2 % BLD: 94 % (ref 92–97)
SERVICE CMNT-IMP: ABNORMAL
SODIUM SERPL-SCNC: 145 MMOL/L (ref 136–145)
SPECIMEN TYPE: ABNORMAL
TOTAL RESP. RATE, ITRR: 19
TROPONIN I SERPL-MCNC: 0.05 NG/ML (ref 0–0.04)
WBC # BLD AUTO: 7.3 K/UL (ref 4.6–13.2)

## 2018-02-05 PROCEDURE — 93005 ELECTROCARDIOGRAM TRACING: CPT

## 2018-02-05 PROCEDURE — 94640 AIRWAY INHALATION TREATMENT: CPT

## 2018-02-05 PROCEDURE — 71045 X-RAY EXAM CHEST 1 VIEW: CPT

## 2018-02-05 PROCEDURE — 74011250637 HC RX REV CODE- 250/637: Performed by: EMERGENCY MEDICINE

## 2018-02-05 PROCEDURE — 65660000004 HC RM CVT STEPDOWN

## 2018-02-05 PROCEDURE — 36600 WITHDRAWAL OF ARTERIAL BLOOD: CPT

## 2018-02-05 PROCEDURE — 85025 COMPLETE CBC W/AUTO DIFF WBC: CPT | Performed by: EMERGENCY MEDICINE

## 2018-02-05 PROCEDURE — 74011000250 HC RX REV CODE- 250: Performed by: EMERGENCY MEDICINE

## 2018-02-05 PROCEDURE — 99283 EMERGENCY DEPT VISIT LOW MDM: CPT

## 2018-02-05 PROCEDURE — 77030029684 HC NEB SM VOL KT MONA -A

## 2018-02-05 PROCEDURE — 80053 COMPREHEN METABOLIC PANEL: CPT | Performed by: EMERGENCY MEDICINE

## 2018-02-05 PROCEDURE — 87804 INFLUENZA ASSAY W/OPTIC: CPT | Performed by: EMERGENCY MEDICINE

## 2018-02-05 PROCEDURE — 74011250636 HC RX REV CODE- 250/636: Performed by: EMERGENCY MEDICINE

## 2018-02-05 PROCEDURE — 83880 ASSAY OF NATRIURETIC PEPTIDE: CPT | Performed by: EMERGENCY MEDICINE

## 2018-02-05 PROCEDURE — 82550 ASSAY OF CK (CPK): CPT | Performed by: EMERGENCY MEDICINE

## 2018-02-05 PROCEDURE — 82803 BLOOD GASES ANY COMBINATION: CPT

## 2018-02-05 PROCEDURE — 94761 N-INVAS EAR/PLS OXIMETRY MLT: CPT

## 2018-02-05 PROCEDURE — 96374 THER/PROPH/DIAG INJ IV PUSH: CPT

## 2018-02-05 RX ORDER — FUROSEMIDE 10 MG/ML
40 INJECTION INTRAMUSCULAR; INTRAVENOUS
Status: COMPLETED | OUTPATIENT
Start: 2018-02-05 | End: 2018-02-05

## 2018-02-05 RX ORDER — NITROGLYCERIN 0.4 MG/1
0.4 TABLET SUBLINGUAL ONCE
Status: COMPLETED | OUTPATIENT
Start: 2018-02-05 | End: 2018-02-05

## 2018-02-05 RX ORDER — IPRATROPIUM BROMIDE 0.5 MG/2.5ML
0.5 SOLUTION RESPIRATORY (INHALATION)
Status: COMPLETED | OUTPATIENT
Start: 2018-02-05 | End: 2018-02-05

## 2018-02-05 RX ORDER — ALBUTEROL SULFATE 0.83 MG/ML
5 SOLUTION RESPIRATORY (INHALATION)
Status: COMPLETED | OUTPATIENT
Start: 2018-02-05 | End: 2018-02-05

## 2018-02-05 RX ADMIN — FUROSEMIDE 40 MG: 10 INJECTION, SOLUTION INTRAMUSCULAR; INTRAVENOUS at 19:04

## 2018-02-05 RX ADMIN — ALBUTEROL SULFATE 5 MG: 2.5 SOLUTION RESPIRATORY (INHALATION) at 18:27

## 2018-02-05 RX ADMIN — NITROGLYCERIN 0.4 MG: 0.4 TABLET SUBLINGUAL at 18:22

## 2018-02-05 RX ADMIN — IPRATROPIUM BROMIDE 0.5 MG: 0.5 SOLUTION RESPIRATORY (INHALATION) at 19:04

## 2018-02-05 NOTE — ED PROVIDER NOTES
EMERGENCY DEPARTMENT HISTORY AND PHYSICAL EXAM    5:23 PM      Date: 2/5/2018  Patient Name: Norma Alvarez    History of Presenting Illness     No chief complaint on file. History Provided By: Patient    Chief Complaint: shortness of breath  Duration:several  Days  Timing:  Intermittent and Worsening  Location: chest   Quality: Tightness  Severity: Moderate  Modifying Factors: no relief from home inhalers  Associated Symptoms:  cough, body aches      Additional History (Context): Norma Alvarez is a 61 y.o. male with hypertension and CHF, A-fib who presents with several days of intermittent moderate shortness of breath (chest tightness) that got worse today with no relief from home inhalers along with a cough and body aches. Pt could not specify how many days this has been going on. Pt states he recently had a MI along with fluid around his heart. No other concerns or symptoms at this time. PCP: Josh Canales MD    Current Facility-Administered Medications   Medication Dose Route Frequency Provider Last Rate Last Dose    furosemide (LASIX) injection 40 mg  40 mg IntraVENous NOW Isabel Barone MD        ipratropium (ATROVENT) 0.02 % nebulizer solution 0.5 mg  0.5 mg Nebulization NOW Isabel Barone MD         Current Outpatient Prescriptions   Medication Sig Dispense Refill    lisinopril (PRINIVIL, ZESTRIL) 10 mg tablet Take 1 Tab by mouth daily. 30 Tab 11    furosemide (LASIX) 40 mg tablet Take 1 Tab by mouth daily. Or as directed 30 Tab 11    carvedilol (COREG) 25 mg tablet Take 1 Tab by mouth every twelve (12) hours. Indications: Chronic Heart Failure 60 Tab 11    methocarbamol (ROBAXIN) 500 mg tablet Take 1 Tab by mouth four (4) times daily. 30 Tab 0    nitroglycerin (NITROSTAT) 0.4 mg SL tablet 1 Tab by SubLINGual route every five (5) minutes as needed for Chest Pain.  1 Bottle 1       Past History     Past Medical History:  Past Medical History:   Diagnosis Date    Atrial fibrillation ([address] 5/6/15    LV EF 50% (may 6927)    Diastolic HF (heart failure) ([facility])     LV EF 50% (echo may 2015)    History of echocardiogram 05/07/2015    LVE. EF 50%. No WMA. Mod conc LVH. Indeterminate diastolic fx. Mild RVE. RVSP at least 37 mmHg. Severe LAE. Mod ROSEMARIE. Mod MR. Marked AV calcification vs vegetation. Mod TR.  Hypertension     Tobacco use        Past Surgical History:  Past Surgical History:   Procedure Laterality Date    HX HERNIA REPAIR      HX HIP REPLACEMENT Bilateral     right in March 2013, left May 2013       Family History:  Family History   Problem Relation Age of Onset    Heart Failure Mother     Obesity Mother     Diabetes Father        Social History:  Social History   Substance Use Topics    Smoking status: Current Every Day Smoker     Packs/day: 0.50     Years: 40.00    Smokeless tobacco: Not on file      Comment: trying to quit    Alcohol use No       Allergies:  No Known Allergies      Review of Systems       Review of Systems   Constitutional: Negative for chills and fever. Positive for bodyaches   Respiratory: Positive for cough and shortness of breath. Cardiovascular: Positive for leg swelling. Negative for chest pain. Gastrointestinal: Negative for diarrhea, nausea and vomiting. All other systems reviewed and are negative. Physical Exam     Visit Vitals    BP (!) 177/103 (BP 1 Location: Left arm)    Pulse 85    Temp 98.1 °F (36.7 °C)    Resp 18    Ht 5' 11\" (1.803 m)    Wt 113.4 kg (250 lb)    SpO2 97%    BMI 34.87 kg/m2         Physical Exam   Constitutional: He is oriented to person, place, and time. He appears well-developed and well-nourished. No distress. Sleepy but arousable   HENT:   Head: Normocephalic and atraumatic. Eyes: Conjunctivae and EOM are normal. Right eye exhibits no discharge. Left eye exhibits no discharge. No scleral icterus. Neck: Normal range of motion. Neck supple.  No tracheal deviation present. Cardiovascular: Normal rate, regular rhythm and normal heart sounds. No murmur heard. Pulmonary/Chest: Effort normal. No respiratory distress. He has decreased breath sounds. He has no wheezes. He has no rales. Abdominal: Soft. He exhibits distension. There is no tenderness. There is no rebound and no guarding. Musculoskeletal: Normal range of motion. He exhibits no edema or deformity. +2 pedal edema   Neurological: He is oriented to person, place, and time. No cranial nerve deficit. Skin: Skin is warm and dry. He is not diaphoretic. Psychiatric: He has a normal mood and affect. His behavior is normal. Judgment and thought content normal.         Diagnostic Study Results     Labs -  Recent Results (from the past 12 hour(s))   CBC WITH AUTOMATED DIFF    Collection Time: 02/05/18  5:48 PM   Result Value Ref Range    WBC 7.3 4.6 - 13.2 K/uL    RBC 4.99 4.70 - 5.50 M/uL    HGB 14.3 13.0 - 16.0 g/dL    HCT 42.2 36.0 - 48.0 %    MCV 84.6 74.0 - 97.0 FL    MCH 28.7 24.0 - 34.0 PG    MCHC 33.9 31.0 - 37.0 g/dL    RDW 16.5 (H) 11.6 - 14.5 %    PLATELET 656 (L) 377 - 420 K/uL    NEUTROPHILS 66 40 - 73 %    LYMPHOCYTES 25 21 - 52 %    MONOCYTES 8 3 - 10 %    EOSINOPHILS 1 0 - 5 %    BASOPHILS 0 0 - 2 %    ABS. NEUTROPHILS 4.8 1.8 - 8.0 K/UL    ABS. LYMPHOCYTES 1.8 0.9 - 3.6 K/UL    ABS. MONOCYTES 0.6 0.05 - 1.2 K/UL    ABS. EOSINOPHILS 0.1 0.0 - 0.4 K/UL    ABS.  BASOPHILS 0.0 0.0 - 0.06 K/UL    PLATELET COMMENTS LARGE PLATELETS      RBC COMMENTS ANISOCYTOSIS  1+        RBC COMMENTS TARGET CELLS  1+        RBC COMMENTS STOMATOCYTES  1+        DF AUTOMATED     METABOLIC PANEL, COMPREHENSIVE    Collection Time: 02/05/18  5:48 PM   Result Value Ref Range    Sodium 145 136 - 145 mmol/L    Potassium 4.0 3.5 - 5.5 mmol/L    Chloride 110 (H) 100 - 108 mmol/L    CO2 30 21 - 32 mmol/L    Anion gap 5 3.0 - 18 mmol/L    Glucose 98 74 - 99 mg/dL    BUN 13 7.0 - 18 MG/DL    Creatinine 0.87 0.6 - 1.3 MG/DL BUN/Creatinine ratio 15 12 - 20      GFR est AA >60 >60 ml/min/1.73m2    GFR est non-AA >60 >60 ml/min/1.73m2    Calcium 9.9 8.5 - 10.1 MG/DL    Bilirubin, total 1.1 (H) 0.2 - 1.0 MG/DL    ALT (SGPT) 47 16 - 61 U/L    AST (SGOT) 37 15 - 37 U/L    Alk. phosphatase 96 45 - 117 U/L    Protein, total 8.0 6.4 - 8.2 g/dL    Albumin 3.9 3.4 - 5.0 g/dL    Globulin 4.1 (H) 2.0 - 4.0 g/dL    A-G Ratio 1.0 0.8 - 1.7     CARDIAC PANEL,(CK, CKMB & TROPONIN)    Collection Time: 02/05/18  5:48 PM   Result Value Ref Range     39 - 308 U/L    CK - MB 2.4 <3.6 ng/ml    CK-MB Index 2.3 0.0 - 4.0 %    Troponin-I, Qt. 0.05 (H) 0.0 - 0.045 NG/ML   NT-PRO BNP    Collection Time: 02/05/18  5:48 PM   Result Value Ref Range    NT pro-BNP 3084 (H) 0 - 900 PG/ML       Radiologic Studies -   XR CHEST SNGL V   Final Result      CXR: Impression:     1.  Enlarged cardiac silhouette with vascular engorgement. 2.  No airspace disease. Medical Decision Making   I am the first provider for this patient. I reviewed the vital signs, available nursing notes, past medical history, past surgical history, family history and social history. Vital Signs-Reviewed the patient's vital signs. Records Reviewed: Nursing Notes and Old Medical Records (Time of Review: 5:23 PM)    ED Course: Progress Notes, Reevaluation, and Consults:  Consult:  Discussed care with Dr Paul Dickey, Specialty: Hospitalist Standard discussion; including history of patients chief complaint, available diagnostic results, and treatment course. Agrees to admit, asks that pt be placed on BIPAP  6:35 PM, 2/5/2018       Provider Notes (Medical Decision Making): Pt with CHF exacerbation, admitted. US guided IV was placed then lost and another USIV was placed.      Procedures:   Ultra sound guided IV  Date/Time: 2/5/2018 6:00 PM  Performed by: Prakash Stevenson by: Darylene Riches     Consent:     Consent obtained:  Verbal    Consent given by:  Patient    Risks discussed:  Bleeding and pain    Alternatives discussed:  No treatment and delayed treatment  Indications:     Indications:  IV access needed  Pre-procedure details:     Skin preparation:  ChloraPrep  Post-procedure details:     Patient tolerance of procedure: Tolerated well, no immediate complications    Ultra sound guided IV  Date/Time: 2/5/2018 6:44 PM  Performed by: Yaakov Arthur by: Lalito Rodriguez     Consent:     Consent obtained:  Verbal    Consent given by:  Patient    Risks discussed:  Bleeding and pain    Alternatives discussed:  No treatment and delayed treatment  Indications:     Indications:  IV access needed, prior line accidently removed  Pre-procedure details:     Skin preparation:  ChloraPrep  Post-procedure details:     Patient tolerance of procedure: Tolerated well, no immediate complications      CRITICAL CARE:  6:36 PM  I have spent 30 minutes of critical care time involved in lab review, consultations with specialist, family decision-making, and documentation. During this entire length of time I was immediately available to the patient. Critical Care: The reason for providing this level of medical care for this critically ill patient was due a critical illness that impaired one or more vital organ systems such that there was a high probability of imminent or life threatening deterioration in the patients condition. This care involved high complexity decision making to assess, manipulate, and support vital system functions, to treat this degreee vital organ system failure and to prevent further life threatening deterioration of the patients condition. For Hospitalized Patients:    1. Hospitalization Decision Time:  The decision to hospitalize the patient was made by Dr. Claudia Baron at 6:34 PM on 2/5/2018    2.  Aspirin: Aspirin was not given because the patient did not present with a stroke at the time of their Emergency Department evaluation    Diagnosis     Clinical Impression:   1. Acute on chronic congestive heart failure, unspecified congestive heart failure type ([facility] [address]        Disposition: Admit    Follow-up Information     None           Patient's Medications   Start Taking    No medications on file   Continue Taking    CARVEDILOL (COREG) 25 MG TABLET    Take 1 Tab by mouth every twelve (12) hours. Indications: Chronic Heart Failure    FUROSEMIDE (LASIX) 40 MG TABLET    Take 1 Tab by mouth daily. Or as directed    LISINOPRIL (PRINIVIL, ZESTRIL) 10 MG TABLET    Take 1 Tab by mouth daily. METHOCARBAMOL (ROBAXIN) 500 MG TABLET    Take 1 Tab by mouth four (4) times daily. NITROGLYCERIN (NITROSTAT) 0.4 MG SL TABLET    1 Tab by SubLINGual route every five (5) minutes as needed for Chest Pain. These Medications have changed    No medications on file   Stop Taking    No medications on file     _______________________________    Attestations:  [address] [place] [address] acting as a scribe for and in the presence of Lupe Cruz MD      February 05, 2018 at 5:23 PM       Provider Attestation:      I personally performed the services described in the documentation, reviewed the documentation, as recorded by the scribe in my presence, and it accurately and completely records my words and actions.  February 05, 2018 at 5:23 PM - Lupe Cruz MD    _______________________________

## 2018-02-05 NOTE — Clinical Note
Status[de-identified] Inpatient [101] Type of Bed: Telemetry [19] Inpatient Hospitalization Certified Necessary for the Following Reasons: 3. Patient receiving treatment that can only be provided in an inpatient setting (further clarification in H&P documentation) Admitting Diagnosis: [facility] exacerbation ([facility] [1004642] Admitting Physician: Justin Will [[id number]] Attending Physician: Justin Will [[id number]] Estimated Length of Stay: 2 Midnights Discharge Plan[de-identified] [facility]

## 2018-02-05 NOTE — IP AVS SNAPSHOT
[address] [address] Geovanni Thompson Patient: Sofia Maciel MRN: [id number] JULIA:[birth year] A check carline indicates which time of day the medication should be taken. My Medications START taking these medications Instructions Each Dose to Equal  
 Morning Noon Evening Bedtime  
 aspirin 81 mg chewable tablet Start taking on:  2/9/2018 Your last dose was:  2/8/2018--9am  
   
 Take 1 Tab by mouth daily. 81 mg  
    
  
   
   
   
  
 potassium chloride 20 mEq tablet Commonly known as:  K-LULY SHEEHANOR-CON Your last dose was:  2/8/2018--3pm  
   
 Take 1 Tab by mouth two (2) times daily (with meals). 20 mEq CHANGE how you take these medications Instructions Each Dose to Equal  
 Morning Noon Evening Bedtime  
 furosemide 40 mg tablet Commonly known as:  LASIX What changed:  when to take this Your last dose was:  2/8/2018--9am  
   
 Take 1 Tab by mouth Before breakfast and dinner. Or as directed 40 mg  
    
  
   
   
  
   
  
 lisinopril 20 mg tablet Commonly known as:  David Pilot What changed:   
- medication strength 
- how much to take Your last dose was:  2/8/2018--9am  
   
 Take 1 Tab by mouth daily. 20 mg CONTINUE taking these medications Instructions Each Dose to Equal  
 Morning Noon Evening Bedtime  
 carvedilol 25 mg tablet Commonly known as:  Delphiyanni [place] Your last dose was:  2/8/2018--9am  
   
 Take 1 Tab by mouth every twelve (12) hours. Indications: Chronic Heart Failure 25 mg  
    
  
   
   
   
  
  
 methocarbamol 500 mg tablet Commonly known as:  ROBAXIN Your last dose was:  2/8/2018--3pm  
   
 Take 1 Tab by mouth four (4) times daily. 500 mg  
    
  
   
  
   
  
   
  
  
 nitroglycerin 0.4 mg SL tablet Commonly known as:  NITROSTAT Your last dose was:  2/5/2018--6pm  
   
 1 Tab by SubLINGual route every five (5) minutes as needed for Chest Pain. 0.4 mg Where to Get Your Medications Information on where to get these meds will be given to you by the nurse or doctor. ! Ask your nurse or doctor about these medications  
  aspirin 81 mg chewable tablet  
 carvedilol 25 mg tablet  
 furosemide 40 mg tablet  
 lisinopril 20 mg tablet  
 nitroglycerin 0.4 mg SL tablet  
 potassium chloride 20 mEq tablet

## 2018-02-05 NOTE — IP AVS SNAPSHOT
[address] Patient: Charlene Luu MRN: [id number] PBC:4/3/9632 About your hospitalization You were admitted on:  February 5, 2018 You last received care in the:  [address] [place] [address] You were discharged on:  February 8, 2018 Why you were hospitalized Your primary diagnosis was:  Not on File Your diagnoses also included:  Chf Exacerbation (Hcc), Chf (Congestive Heart Failure) (Hcc) Follow-up Information Follow up With Details Comments Contact Info Poppy Rosas MD On 2/19/2018 @ 1:30 pm [address] [facility] [address] 
[phone] Saintclair Hires, MD On 2/22/2018 @ 3:00 pm 27 [facility] [address] [place] [address] 
[phone] Your Scheduled Appointments Thursday February 22, 2018  3:00 PM [facility] with Britt Josue NP [facility] [place] [facility] [place] [id number] [address] [phone] Discharge Orders None A check carline indicates which time of day the medication should be taken. My Medications START taking these medications Instructions Each Dose to Equal  
 Morning Noon Evening Bedtime  
 aspirin 81 mg chewable tablet Start taking on:  2/9/2018 Your last dose was:  2/8/2018--9am  
   
 Take 1 Tab by mouth daily. 81 mg  
    
  
   
   
   
  
 potassium chloride 20 mEq tablet Commonly known as:  K-DUR, KLOR-CON Your last dose was:  2/8/2018--3pm  
   
 Take 1 Tab by mouth two (2) times daily (with meals). 20 mEq CHANGE how you take these medications Instructions Each Dose to Equal  
 Morning Noon Evening Bedtime  
 furosemide 40 mg tablet Commonly known as:  LASIX What changed:  when to take this Your last dose was:  2/8/2018--9am  
   
 Take 1 Tab by mouth Before breakfast and dinner. Or as directed 40 mg  
    
  
   
   
  
   
  
 lisinopril 20 mg tablet Commonly known as:  Mckayla Krishnan What changed:   
- medication strength 
- how much to take Your last dose was:  2/8/2018--9am  
   
 Take 1 Tab by mouth daily. 20 mg CONTINUE taking these medications Instructions Each Dose to Equal  
 Morning Noon Evening Bedtime  
 carvedilol 25 mg tablet Commonly known as:  Dash Si Your last dose was:  2/8/2018--9am  
   
 Take 1 Tab by mouth every twelve (12) hours. Indications: Chronic Heart Failure 25 mg  
    
  
   
   
   
  
  
 methocarbamol 500 mg tablet Commonly known as:  ROBAXIN Your last dose was:  2/8/2018--3pm  
   
 Take 1 Tab by mouth four (4) times daily. 500 mg  
    
  
   
  
   
  
   
  
  
 nitroglycerin 0.4 mg SL tablet Commonly known as:  NITROSTAT Your last dose was:  2/5/2018--6pm  
   
 1 Tab by SubLINGual route every five (5) minutes as needed for Chest Pain. 0.4 mg Where to Get Your Medications Information on where to get these meds will be given to you by the nurse or doctor. ! Ask your nurse or doctor about these medications  
  aspirin 81 mg chewable tablet  
 carvedilol 25 mg tablet  
 furosemide 40 mg tablet  
 lisinopril 20 mg tablet  
 nitroglycerin 0.4 mg SL tablet  
 potassium chloride 20 mEq tablet Discharge Instructions Learning About Heart Failure Zones What are heart failure zones? Heart failure zones give you an easy way to see changes in your heart failure symptoms. They also tell you when you need to get help. Check every day to see which zone you are in. Green zone. You are doing well. This is where you want to be. · Your weight is stable. This means it is not going up or down. · You breathe easily. · You are sleeping well.  You are able to lie flat without shortness of breath. · You can do your usual activities. Yellow zone. Be careful. Your symptoms are changing. Call your doctor. · You have new or increased shortness of breath. · You are dizzy or lightheaded, or you feel like you may faint. · You have sudden weight gain, such as more than 2 to 3 pounds in a day or 5 pounds in a week. (Your doctor may suggest a different range of weight gain.) · You have increased swelling in your legs, ankles, or feet. · You are so tired or weak that you cannot do your usual activities. · You are not sleeping well. Shortness of breath wakes you up at night. You need extra pillows. Your doctor's name: ____________________________________________________________ Your doctor's contact information: _________________________________________________ Red zone. This is an emergency. Call 911. You have symptoms of sudden heart failure, such as: 
· You have severe trouble breathing. · You cough up pink, foamy mucus. · You have a new irregular or fast heartbeat. You have symptoms of a heart attack. These may include: · Chest pain or pressure, or a strange feeling in the chest. 
· Sweating. · Shortness of breath. · Nausea or vomiting. · Pain, pressure, or a strange feeling in the back, neck, jaw, or upper belly or in one or both shoulders or arms. · Lightheadedness or sudden weakness. · A fast or irregular heartbeat. If you have symptoms of a heart attack: After you call 911, the  may tell you to chew 1 adult-strength or 2 to 4 low-dose aspirin. Wait for an ambulance. Do not try to drive yourself. Follow-up care is a key part of your treatment and safety. Be sure to make and go to all appointments, and call your doctor if you are having problems. It's also a good idea to know your test results and keep a list of the medicines you take. Where can you learn more? Go to http://aixa-lizet.info/. Enter T174 in the search box to learn more about \"Learning About Heart Failure Zones. \" Current as of: September 21, 2016 Content Version: 11.4 © 9896-2781 [employer]. Care instructions adapted under license by [employer] (which disclaims liability or warranty for this information). If you have questions about a medical condition or this instruction, always ask your healthcare professional. [place] [address] any warranty or liability for your use of this information. Heart Rhythm Problems in Heart Failure: Care Instructions Your Care Instructions A heart rhythm problem, or arrhythmia, is a change in the normal rhythm of your heart. Your heart may beat too fast or too slow or beat with an irregular or skipping rhythm. A change in the heart's rhythm may feel like a really strong heartbeat or a fluttering in your chest. A severe heart rhythm problem can keep the body from getting the blood it needs. This can cause shortness of breath, lightheadedness, and fainting. A heart rhythm problem can make your heart failure worse and increase your chance of dying suddenly. You may take medicine to treat your condition. Your doctor may recommend a pacemaker, an implantable cardioverter-defibrillator (ICD), or a procedure called catheter ablation to destroy small parts of the heart that are causing a rhythm problem. Follow-up care is a key part of your treatment and safety. Be sure to make and go to all appointments, and call your doctor if you are having problems. It's also a good idea to know your test results and keep a list of the medicines you take. How can you care for yourself at home? · Take your medicines exactly as prescribed. Talk to your doctor if you have any problems with your medicines. · If you received a pacemaker or a defibrillator, you will get a fact sheet about it. · Wear a medical alert ID bracelet.  You can buy one at most drugsMyoPowers Medical Technologies or order it on the Internet. · Make sure you go to your follow-up appointments. To change your lifestyle · Do not smoke. · Eat a heart-healthy diet. · Do not drink too much alcohol. Also, get enough sleep, and do not overeat. · Ask your doctor whether you can take over-the-counter medicines (such as decongestants). These can make your heart beat fast. 
Be active · Start light exercise if your doctor says you can. Even a small amount will help you get stronger, have more energy, and manage your stress. · Walk to get exercise easily. Start by walking a little more than you did the day before. Bit by bit, increase the amount you walk. · When you exercise, watch for signs that your heart is working too hard. You are pushing too hard if you cannot talk while you exercise. If you become short of breath or dizzy or have chest pain, sit down and rest. 
· If your doctor has not set you up with a cardiac rehabilitation (rehab) program, talk to him or her about whether that is right for you. Cardiac rehab includes exercise, help with diet and lifestyle changes, and emotional support. It may reduce your risk of future heart problems. · Check your pulse daily. Place two fingers on the artery at the palm side of your wrist, in line with your thumb. If your heartbeat seems uneven, talk to your doctor. When should you call for help? Call 911 if you have symptoms of sudden heart failure, such as: 
? · You have severe trouble breathing. ? · You cough up pink, foamy mucus. ? · You have a new irregular or rapid heartbeat. ?Call 911 if you have symptoms of a heart attack. These may include: 
? · Chest pain or pressure, or a strange feeling in the chest.  
? · Sweating. ? · Shortness of breath. ? · Nausea or vomiting. ? · Pain, pressure, or a strange feeling in the back, neck, jaw, or upper belly or in one or both shoulders or arms. ? · Lightheadedness or sudden weakness. ? · A fast or irregular heartbeat. ?After you call 911, the  may tell you to chew 1 adult-strength or 2 to 4 low-dose aspirin. Wait for an ambulance. Do not try to drive yourself. ?Call your doctor now or seek immediate medical care if: 
? · You have new or increased shortness of breath. ? · You are dizzy or lightheaded, or you feel like you may faint. ? · You have sudden weight gain, such as more than 2 to 3 pounds in a day or 5 pounds in a week. (Your doctor may suggest a different range of weight gain.) ? · You have increased swelling in your legs, ankles, or feet. ? · You are suddenly so tired or weak that you cannot do your usual activities. ? Watch closely for changes in your health, and be sure to contact your doctor if you develop new symptoms. Where can you learn more? Go to http://aixa-lizet.info/. Enter E937 in the search box to learn more about \"Heart Rhythm Problems in Heart Failure: Care Instructions. \" Current as of: September 21, 2016 Content Version: 11.4 © 0619-0831 LAN-Power. Care instructions adapted under license by [employer] (which disclaims liability or warranty for this information). If you have questions about a medical condition or this instruction, always ask your healthcare professional. [address] any warranty or liability for your use of this information. Avoiding Triggers With Heart Failure: Care Instructions Your Care Instructions Triggers are anything that make your heart failure flare up. A flare-up is also called \"sudden heart failure\" or \"acute heart failure. \" When you have a flare-up, fluid builds up in your lungs, and you have problems breathing. You might need to go to the hospital. By watching for changes in your condition and avoiding triggers, you can prevent heart failure flare-ups. Follow-up care is a key part of your treatment and safety.  Be sure to make and go to all appointments, and call your doctor if you are having problems. It's also a good idea to know your test results and keep a list of the medicines you take. How can you care for yourself at home? Watch for changes in your weight and condition · Weigh yourself without clothing at the same time each day. Record your weight. Call your doctor if you have sudden weight gain, such as more than 2 to 3 pounds in a day or 5 pounds in a week. (Your doctor may suggest a different range of weight gain.) A sudden weight gain may mean that your heart failure is getting worse. · Keep a daily record of your symptoms. Write down any changes in how you feel, such as new shortness of breath, cough, or problems eating. Also record if your ankles are more swollen than usual and if you feel more tired than usual. Note anything that you ate or did that could have triggered these changes. Limit sodium Sodium causes your body to hold on to extra water. This may cause your heart failure symptoms to get worse. People get most of their sodium from processed foods. Fast food and restaurant meals also tend to be very high in sodium. · Your doctor may suggest that you limit sodium to 2,000 milligrams (mg) a day or less. That is less than 1 teaspoon of salt a day, including all the salt you eat in cooking or in packaged foods. · Read food labels on cans and food packages. They tell you how much sodium you get in one serving. Check the serving size. If you eat more than one serving, you are getting more sodium. · Be aware that sodium can come in forms other than salt, including monosodium glutamate (MSG), sodium citrate, and sodium bicarbonate (baking soda). MSG is often added to Asian food. You can sometimes ask for food without MSG or salt. · Slowly reducing salt will help you adjust to the taste. Take the salt shaker off the table.  
· Flavor your food with garlic, lemon juice, onion, vinegar, herbs, and spices instead of salt. Do not use soy sauce, steak sauce, onion salt, garlic salt, mustard, or ketchup on your food, unless it is labeled \"low-sodium\" or \"low-salt. \" 
· Make your own salad dressings, sauces, and ketchup without adding salt. · Use fresh or frozen ingredients, instead of canned ones, whenever you can. Choose low-sodium canned goods. · Eat less processed food and food from restaurants, including fast food. Exercise as directed Moderate, regular exercise is very good for your heart. It improves your blood flow and helps control your weight. But too much exercise can stress your heart and cause a heart failure flare-up. · Check with your doctor before you start an exercise program. 
· Walking is an easy way to get exercise. Start out slowly. Gradually increase the length and pace of your walk. Swimming, riding a bike, and using a treadmill are also good forms of exercise. · When you exercise, watch for signs that your heart is working too hard. You are pushing yourself too hard if you cannot talk while you are exercising. If you become short of breath or dizzy or have chest pain, stop, sit down, and rest. 
· Do not exercise when you do not feel well. Take medicines correctly · Take your medicines exactly as prescribed. Call your doctor if you think you are having a problem with your medicine. · Make a list of all the medicines you take. Include those prescribed to you by other doctors and any over-the-counter medicines, vitamins, or supplements you take. Take this list with you when you go to any doctor. · Take your medicines at the same time every day. It may help you to post a list of all the medicines you take every day and what time of day you take them. · Make taking your medicine as simple as you can. Plan times to take your medicines when you are doing other things, such as eating a meal or getting ready for bed. This will make it easier to remember to take your medicines. · Get organized. Use helpful tools, such as daily or weekly pill containers. When should you call for help? Call 911 if you have symptoms of sudden heart failure such as: 
? · You have severe trouble breathing. ? · You cough up pink, foamy mucus. ? · You have a new irregular or rapid heartbeat. ?Call your doctor now or seek immediate medical care if: 
? · You have new or increased shortness of breath. ? · You are dizzy or lightheaded, or you feel like you may faint. ? · You have sudden weight gain, such as more than 2 to 3 pounds in a day or 5 pounds in a week. (Your doctor may suggest a different range of weight gain.) ? · You have increased swelling in your legs, ankles, or feet. ? · You are suddenly so tired or weak that you cannot do your usual activities. ? Watch closely for changes in your health, and be sure to contact your doctor if you develop new symptoms. Where can you learn more? Go to http://aixa-lizet.info/. Enter W000 in the search box to learn more about \"Avoiding Triggers With Heart Failure: Care Instructions. \" Current as of: September 21, 2016 Content Version: 11.4 © 7595-7118 Smart Plate. Care instructions adapted under license by [employer]ADO (which disclaims liability or warranty for this information). If you have questions about a medical condition or this instruction, always ask your healthcare professional. [place] [address] any warranty or liability for your use of this information. Patient armband removed and shredded. DISCHARGE SUMMARY from Nurse PATIENT INSTRUCTIONS: 
 
 
F-face looks uneven A-arms unable to move or move unevenly S-speech slurred or non-existent T-time-call 911 as soon as signs and symptoms begin-DO NOT go Back to bed or wait to see if you get better-TIME IS BRAIN. Warning Signs of HEART ATTACK Call 911 if you have these symptoms: 
? Chest discomfort. Most heart attacks involve discomfort in the center of the chest that lasts more than a few minutes, or that goes away and comes back. It can feel like uncomfortable pressure, squeezing, fullness, or pain. ? Discomfort in other areas of the upper body. Symptoms can include pain or discomfort in one or both arms, the back, neck, jaw, or stomach. ? Shortness of breath with or without chest discomfort. ? Other signs may include breaking out in a cold sweat, nausea, or lightheadedness. Don't wait more than five minutes to call [address]! Fast action can save your life. Calling 911 is almost always the fastest way to get lifesaving treatment. Emergency Medical Services staff can begin treatment when they arrive  up to an hour sooner than if someone gets to the hospital by car. The discharge information has been reviewed with the patient. The patient verbalized understanding. Discharge medications reviewed with the patient and appropriate educational materials and side effects teaching were provided. ___________________________________________________________________________________________________________________________________ ACO Transitions of Care Introducing [employer] [address] offers a voluntary care coordination program to provide high quality service and care to [facility] fee-for-service beneficiaries. Daniella Harding was designed to help you enhance your health and well-being through the following services: ? Transitions of Care  support for individuals who are transitioning from one care setting to another (example: Hospital to home). ? Chronic and Complex Care Coordination  support for individuals and caregivers of those with serious or chronic illnesses or with more than one chronic (ongoing) condition and those who take a number of different medications. If you meet specific medical criteria, a [facility] may call you directly to coordinate your care with your primary care physician and your other care providers. For questions about the [facility] programs, please, contact your physicians office. For general questions or additional information about Accountable Care Organizations: 
Please visit www.medicare.gov/acos. html or call 1-800-MEDICARE (8-919.101.6853) Wan Shidao managementY users should call 3-581.644.5726. BeSmart Announcement We are excited to announce that we are making your provider's discharge notes available to you in BeSmart. You will see these notes when they are completed and signed by the physician that discharged you from your recent hospital stay. If you have any questions or concerns about any information you see in BeSmart, please call the Health Information Department where you were seen or reach out to your Primary Care Provider for more information about your plan of care. Introducing \[facility]\"" & HEALTH SERVICES! Veronica Collins introduces BeSmart patient portal. Now you can access parts of your medical record, email your doctor's office, and request medication refills online. 1. In your internet browser, go to [email or web address] 2. Click on the First Time User? Click Here link in the Sign In box. You will see the New Member Sign Up page. 3. Enter your BeSmart Access Code exactly as it appears below. You will not need to use this code after youve completed the sign-up process. If you do not sign up before the expiration date, you must request a new code. · Betfair Access Code: [email or web address] Expires: 5/9/2018  8:34 AM 
 
4. Enter the last four digits of your Social Security Number (xxxx) and Date of Birth (mm/dd/yyyy) as indicated and click Submit. You will be taken to the next sign-up page. 5. Create a Betfair ID. This will be your Betfair login ID and cannot be changed, so think of one that is secure and easy to remember. 6. Create a Betfair password. You can change your password at any time. 7. Enter your Password Reset Question and Answer. This can be used at a later time if you forget your password. 8. Enter your e-mail address. You will receive e-mail notification when new information is available in [address] 9. Click Sign Up. You can now view and download portions of your medical record. 10. Click the Download Summary menu link to download a portable copy of your medical information. If you have questions, please visit the Frequently Asked Questions section of the Betfair website. Remember, Betfair is NOT to be used for urgent needs. For medical emergencies, dial 911. Now available from your iPhone and Android! Providers Seen During Your Hospitalization Provider Specialty Primary office phone Medardo Daniels MD Emergency Medicine [phone] Florentin Clark MD Internal Medicine [phone] Carlynn Sicard, MD [facility] [phone] Meryle Nutley, MD Internal Medicine [phone] Immunizations Administered for This Admission Name Date Influenza Vaccine (Quad) PF 2/7/2018 Your Primary Care Physician (PCP) Primary Care Physician Office Phone Office Fax Sandor De Leon [phone] You are allergic to the following No active allergies Recent Documentation Height Weight BMI Smoking Status 1.803 m 119.5 kg 36.75 kg/m2 Current Every Day Smoker Emergency Contacts Name Discharge Info Relation Home Work Mobile [address] [facility] CAREGIVER [3] Spouse [3] [phone] Patient Belongings The following personal items are in your possession at time of discharge: 
  Dental Appliances: None  Visual Aid: None      Home Medications: None   Jewelry: Ring (2 white rings)  Clothing: Pants, Shirt, Undergarments    Other Valuables: Cell Phone, Other (comment) (x3 cell phone. cpap own machine) Please provide this summary of care documentation to your next provider. Signatures-by signing, you are acknowledging that this After Visit Summary has been reviewed with you and you have received a copy. Patient Signature:  ____________________________________________________________ Date:  ____________________________________________________________  
  
Devota Dandy Provider Signature:  ____________________________________________________________ Date:  ____________________________________________________________

## 2018-02-06 LAB
ATRIAL RATE: 90 BPM
CALCULATED R AXIS, ECG10: -8 DEGREES
CALCULATED T AXIS, ECG11: 60 DEGREES
DIAGNOSIS, 93000: NORMAL
Q-T INTERVAL, ECG07: 390 MS
QRS DURATION, ECG06: 116 MS
QTC CALCULATION (BEZET), ECG08: 466 MS
TROPONIN I SERPL-MCNC: 0.04 NG/ML (ref 0–0.04)
VENTRICULAR RATE, ECG03: 86 BPM

## 2018-02-06 PROCEDURE — 84484 ASSAY OF TROPONIN QUANT: CPT | Performed by: INTERNAL MEDICINE

## 2018-02-06 PROCEDURE — 74011250636 HC RX REV CODE- 250/636: Performed by: INTERNAL MEDICINE

## 2018-02-06 PROCEDURE — 74011250637 HC RX REV CODE- 250/637: Performed by: INTERNAL MEDICINE

## 2018-02-06 PROCEDURE — 65660000004 HC RM CVT STEPDOWN

## 2018-02-06 PROCEDURE — 77010033678 HC OXYGEN DAILY: Performed by: INTERNAL MEDICINE

## 2018-02-06 PROCEDURE — 93306 TTE W/DOPPLER COMPLETE: CPT

## 2018-02-06 PROCEDURE — 36415 COLL VENOUS BLD VENIPUNCTURE: CPT | Performed by: INTERNAL MEDICINE

## 2018-02-06 RX ORDER — CARVEDILOL 25 MG/1
25 TABLET ORAL EVERY 12 HOURS
Status: DISCONTINUED | OUTPATIENT
Start: 2018-02-06 | End: 2018-02-08 | Stop reason: HOSPADM

## 2018-02-06 RX ORDER — GUAIFENESIN 100 MG/5ML
81 LIQUID (ML) ORAL DAILY
Status: DISCONTINUED | OUTPATIENT
Start: 2018-02-06 | End: 2018-02-08 | Stop reason: HOSPADM

## 2018-02-06 RX ORDER — LISINOPRIL 10 MG/1
10 TABLET ORAL DAILY
Status: DISCONTINUED | OUTPATIENT
Start: 2018-02-06 | End: 2018-02-07

## 2018-02-06 RX ORDER — FUROSEMIDE 10 MG/ML
40 INJECTION INTRAMUSCULAR; INTRAVENOUS 2 TIMES DAILY
Status: DISCONTINUED | OUTPATIENT
Start: 2018-02-06 | End: 2018-02-08

## 2018-02-06 RX ORDER — HEPARIN SODIUM 5000 [USP'U]/ML
5000 INJECTION, SOLUTION INTRAVENOUS; SUBCUTANEOUS EVERY 8 HOURS
Status: DISCONTINUED | OUTPATIENT
Start: 2018-02-06 | End: 2018-02-08 | Stop reason: HOSPADM

## 2018-02-06 RX ORDER — ACETAMINOPHEN 325 MG/1
650 TABLET ORAL
Status: DISCONTINUED | OUTPATIENT
Start: 2018-02-06 | End: 2018-02-07

## 2018-02-06 RX ORDER — METHOCARBAMOL 500 MG/1
500 TABLET, FILM COATED ORAL 4 TIMES DAILY
Status: DISCONTINUED | OUTPATIENT
Start: 2018-02-06 | End: 2018-02-08 | Stop reason: HOSPADM

## 2018-02-06 RX ADMIN — METHOCARBAMOL 500 MG: 500 TABLET ORAL at 17:30

## 2018-02-06 RX ADMIN — HEPARIN SODIUM 5000 UNITS: 5000 INJECTION, SOLUTION INTRAVENOUS; SUBCUTANEOUS at 12:23

## 2018-02-06 RX ADMIN — FUROSEMIDE 40 MG: 10 INJECTION, SOLUTION INTRAMUSCULAR; INTRAVENOUS at 08:08

## 2018-02-06 RX ADMIN — METHOCARBAMOL 500 MG: 500 TABLET ORAL at 12:24

## 2018-02-06 RX ADMIN — CARVEDILOL 25 MG: 25 TABLET, FILM COATED ORAL at 08:08

## 2018-02-06 RX ADMIN — CARVEDILOL 25 MG: 25 TABLET, FILM COATED ORAL at 23:50

## 2018-02-06 RX ADMIN — METHOCARBAMOL 500 MG: 500 TABLET ORAL at 23:50

## 2018-02-06 RX ADMIN — METHOCARBAMOL 500 MG: 500 TABLET ORAL at 08:08

## 2018-02-06 RX ADMIN — LISINOPRIL 10 MG: 10 TABLET ORAL at 08:08

## 2018-02-06 RX ADMIN — ASPIRIN 81 MG 81 MG: 81 TABLET ORAL at 08:08

## 2018-02-06 RX ADMIN — FUROSEMIDE 40 MG: 10 INJECTION, SOLUTION INTRAMUSCULAR; INTRAVENOUS at 17:30

## 2018-02-06 NOTE — CONSULTS
Card[facility]ists - Consult Note    Consultation request by Yonas Rea MD for advice/opinion related to evaluating CHF exacerbation [facility])  CHF (congestive heart failure) [facility])    Date of  Admission: 2/5/2018  4:46 PM   Primary Care Physician:  Piper Huffman MD     Assessment:     Patient Active Problem List   Diagnosis Code    Diastolic HF (heart failure) ([facility] I50.30    Atrial fibrillation ([facility] I48.91    Hypertension I10    Tobacco use Z72.0    Shortness of breath R06.02    CHF (congestive heart failure) ([facility]) I50.9    CHF exacerbation ([facility]) I50.9       -Acute on chronic diastolic heart failure. Mildly reduced EF 45-50% with moderate MR by echo this admission, unchanged c/w 2016. Hailey Mireles Unclear compliance with medical regimen.  -Hypertensive cardiomyopathy. Concentric LVH with mildly depressed function as appove  -Hypertension. uncontrolled with admission, SBP > 200 on arrival.  -Chronic atrial fibrillation on ASA and BB as outpatient. Unable to afford Xarelto in the past. Not candidate for warfarin given noncompliance issues with the medication in the past.  -Nonobstructive CAD with LCx vasospasm by cath 2008.  -Tobacco abuse. -H/o noncompliance. Primary cardiologist Dr Ruben Alcantara. Plan: Will continue IV lasix watching I/Os and daily weights closely. Will continue Coreg and lisinopril. Continue to encourage compliance with medical regimen and follow up. CHF education  Telemetry monitoring  Check f/u cardiac biomarkers and daily BMPs while diuresing with IV Lasix    Will follow     History of Present Illness: This is a 61 y.o. male admitted for CHF exacerbation ([facility]  CHF (congestive heart failure) ([facility] Patient complains of:  SOB. Patient is a 61year old male with known CHF, afib, noncompliance. Patient presented with increasing SOB and JV left leg greater than right leg. Patient had orthopnea.  Patient reports he takes his medications but cannot confirm which medications he takes. His SBP was > 200 on arrival. Patient was treated with IV lasix. Patient is feeling better today. Patient denies CP, palp, syncope. Cardiac risk factors: smoking/ tobacco exposure, male gender, hypertension      Cath [facility] 2008    Left Main Coronary Artery: This is a large artery. It bifurcates into the left anterior descending and    left circumflex coronary arteries. The left main coronary artery is    angiographically normal.     Left Anterior Descending Coronary Artery: This is an extremely large, type 3 artery that travels along the anterior    interventricular groove and extends around the left ventricular apex. The left    anterior descending gives rise to a small first diagonal branch originating    within its proximal third. The left anterior descending contains minor    atherosclerotic plaques with a 10-15% distal stenosis. No high-grade disease is    seen in the left anterior descending or its diagonal branches. Left Circumflex Coronary Artery: This is a large dominant artery. The circumflex gives rise to a large first    obtuse marginal branch originating within its proximal third. The circumflex in    the atrioventricular groove is small. Following the atrioventricular groove    portion of the circumflex, there is a large distal marginal branch and    posterior descending artery. The circumflex contains minor atherosclerotic    plaques, but no high-grade stenoses. The first large obtuse marginal branch is    angiographically normal. The distal obtuse marginal vessel, which gives rise to    the posterior descending coronary artery, in one view appears to contain a    slightly tubular, 70% stenosis. That view is the Macedonian caudal view. The lesion is    not seen on other views. Right Coronary Artery:    This is a small nondominant artery seen to be angiographically normal.     LEFT VENTRICLE (30-DEGREE RIGHT ANTERIOR OBLIQUE PROJECTION):   Left ventricular cavity size is normal. No segmental wall motion abnormalities    are noted. The estimated ejection fraction is 55%. PERCUTANEOUS CORONARY INTERVENTION:   Following the administration of Angiomax, the guide catheter was placed at the    ostium of the left main coronary artery. The guidewire was carefully    manipulated down the left circumflex coronary artery and across the apparent    stenosed area in the left circumflex artery. The patient was given 100 mcg of    intracoronary nitroglycerin and an angiogram was taken that showed complete    resolution of the apparent 70% circumflex marginal branch stenosis, indicating    an area of coronary vasospasm. Review of Symptoms:   Constitutional: negative for fevers and chills  Eyes: negative for visual disturbance  Ears, nose, mouth, throat, and face: negative for nasal congestion  Respiratory: negative for cough  Cardiovascular: positive for dyspnea, orthopnea, lower extremity edema, negative for chest pain  Gastrointestinal: negative for vomiting and diarrhea  Genitourinary:negative for dysuria  Hematologic/lymphatic: negative for bleeding  Musculoskeletal:negative for muscle weakness  Neurological: negative for dizziness     Past Medical History:     Past Medical History:   Diagnosis Date    Atrial fibrillation ([address] 5/6/15    LV EF 50% (may 6367)    Diastolic HF (heart failure) ([facility])     LV EF 50% (echo may 2015)    History of echocardiogram 05/07/2015    LVE. EF 50%. No WMA. Mod conc LVH. Indeterminate diastolic fx. Mild RVE. RVSP at least 37 mmHg. Severe LAE. Mod ROSEMARIE. Mod MR. Marked AV calcification vs vegetation. Mod TR.       Hypertension     Tobacco use          Social History:     Social History     Social History    Marital status: UNKNOWN     Spouse name: N/A    Number of children: N/A    Years of education: N/A     Social History Main Topics    Smoking status: Current Every Day Smoker     Packs/day: 0.50     Years: 40.00    Smokeless tobacco: None      Comment: trying to quit    Alcohol use No    Drug use: Yes     Special: Marijuana      Comment: daily    Sexual activity: Not Asked     Other Topics Concern    None     Social History Narrative        Family History:     Family History   Problem Relation Age of Onset    Heart Failure Mother     Obesity Mother     Diabetes Father         Medications:   No Known Allergies     Current Facility-Administered Medications   Medication Dose Route Frequency    carvedilol (COREG) tablet 25 mg  25 mg Oral Q12H    lisinopril (PRINIVIL, ZESTRIL) tablet 10 mg  10 mg Oral DAILY    methocarbamol (ROBAXIN) tablet 500 mg  500 mg Oral QID    furosemide (LASIX) injection 40 mg  40 mg IntraVENous BID    acetaminophen (TYLENOL) tablet 650 mg  650 mg Oral Q6H PRN    heparin (porcine) injection 5,000 Units  5,000 Units SubCUTAneous Q8H    aspirin chewable tablet 81 mg  81 mg Oral DAILY         Physical Exam:     Visit Vitals    BP (!) 170/99    Pulse 80    Temp 97.2 °F (36.2 °C)    Resp 24    Ht 5' 11\" (1.803 m)    Wt 112.6 kg (248 lb 3.5 oz)    SpO2 92%    BMI 34.62 kg/m2     BP Readings from Last 3 Encounters:   02/06/18 (!) 170/99   02/02/17 130/40   01/03/17 138/80     Pulse Readings from Last 3 Encounters:   02/06/18 80   02/02/17 72   01/03/17 68     Wt Readings from Last 3 Encounters:   02/06/18 112.6 kg (248 lb 3.5 oz)   02/02/17 123.8 kg (273 lb)   01/03/17 119.7 kg (264 lb)       General:  alert, cooperative, no distress, appears stated age  Neck:   JVD  Lungs:  clear to auscultation bilaterally  Heart:  regular rate and rhythm  Abdomen:  abdomen is soft without significant tenderness, masses, organomegaly or guarding  Extremities:  extremities normal, atraumatic, no cyanosis trace left lower extremity edema  Skin: Warm and dry.  no hyperpigmentation, vitiligo, or suspicious lesions  Neuro: alert, oriented x3, affect appropriate  Psych: non focal     Data Review:     Recent Labs 18   1748   WBC  7.3   HGB  14.3   HCT  42.2   PLT  117*     Recent Labs      18   1748   NA  145   K  4.0   CL  110*   CO2  30   GLU  98   BUN  13   CREA  0.87   CA  9.9   ALB  3.9   SGOT  37   ALT  47       Results for orders placed or performed during the hospital encounter of 18   EKG, 12 LEAD, INITIAL   Result Value Ref Range    Ventricular Rate 86 BPM    Atrial Rate 90 BPM    QRS Duration 116 ms    Q-T Interval 390 ms    QTC Calculation (Bezet) 466 ms    Calculated R Axis -8 degrees    Calculated T Axis 60 degrees    Diagnosis       Atrial fibrillation  Left ventricular hypertrophy with QRS widening  Abnormal ECG  When compared with ECG of 2016 19:39,  No significant change was found     Results for orders placed or performed in visit on 17   AMB POC EKG ROUTINE W/ 12 LEADS, INTER & REP    Narrative    Atrial fibrillation. Results for orders placed or performed during the hospital encounter of 16   ECG HOLTER MONITOR, UP TO 48 HRS    Narrative                               Holter Monitoring Report                               [facility]                      [facility], [address]                                         Test Date:    2016  Pat Name:     Ulysses López          Department:     Patient ID:   [id number]                Room:           Gender:                                Technician:     :                         Requested By:  Yani Aguirre MD  Order Number:                          Jhon MD:   Algernon Primrose, MD                             Interpretive Statements  Ulysses López was in Atrial Flutter. He was in AFIB for 100.00 percent of the recording time. The average heart rate, excluding ectopy, was 80 BPM with a minimum of 43 BPM  at  03:26D2 and a maximum of 170 BPM at   11:59D1. Heart beats, including ectopy, totaled 257644 beats. VENTRICULAR ECTOPICS totaled 3642  averaging  152. 2 per hour with 3565  single, 44 paired, 24 trigeminy and 0 R on T. There was 1 VENTRICULAR TACHYCARDIA with 9 beats 23:47D1 at a rate of 115  BPM.    There were no SUPRAVENTRICULAR ECTOPICS found. PAUSES occurred 37 times, the longest of which was  2.3 seconds at 04:59:51    INTERPRETATION: Abnormal 24 hour Holter monitor study. 1. Rhythm is atrial flutter-fibrillation. 2. QRS normal.  3. (3565) single ve''s, (44) paired, (1) run of VENTRICULAR TACHYCARDIA,  trigeminy noted. 4. (37) pauses noted with the longest being 2.33 seconds. 5. There were no diary entries.     Electronically signed on 02-19-16 16:26:10 CST by Stephen Escalante MD       All Cardiac Markers in the last 24 hours:    Lab Results   Component Value Date/Time     02/05/2018 05:48 PM    CKMB 2.4 02/05/2018 05:48 PM    CKND1 2.3 02/05/2018 05:48 PM    TROIQ 0.05 (H) 02/05/2018 05:48 PM       Last Lipid:    Lab Results   Component Value Date/Time    Cholesterol, total 128 05/06/2015 07:52 AM    HDL Cholesterol 35 05/06/2015 07:52 AM    LDL, calculated 73.2 05/06/2015 07:52 AM    Triglyceride 99 05/06/2015 07:52 AM    CHOL/HDL Ratio 3.7 05/06/2015 07:52 AM       Signed By: URMILA Alejandre     February 6, 2018      Annamaria Day MD

## 2018-02-06 NOTE — HOME CARE
Visited this \" [facility] pt\", explained to pt and his wife Alfonso Left) about [facility] services and how pt can benefit from [place] service, pt and pt's wife are both open to [place] if ordered by MD; pt states he has home O2 and CPAP from [facility] ; pt given brochure on [facility] services ,[facility] will need FOC and [place] orders if services are needed. JAZZY GOULD.    8/5/36- 3:29HP -Notified  Antwan Gabriel) that this [facility] liaison  rounded on this Annmarie Lindquist pt\" yesterday and pt could benefit from [address], will need [place] orders and FOC. JAZZY GOULD.

## 2018-02-06 NOTE — CDMP QUERY
The diagnosis of acute respiratory failure has been documented for your patient. Currently, the documentation does not meet criteria for this diagnosis, and may be challenged by an external reviewer. Please remember to include the clinical indicators to support this diagnosis. Current Documentation:    REFERENCE:   Hypoxemic respiratory failure is characterized by a PaO2 less than 60 mmHg (or 10 mmHg below COPD patients baseline) and a normal or low arterial PaCO2. Hypercapnic respiratory failure is characterized by a PaCO2 higher than 50 mmHg (or 10 mmHG above COPD patients baseline). Acute Respiratory Failure indicators include:   - Respirations <12 or >25   - Air hunger   - Use of accessory muscles of respiration   - Inability to speak in full sentences   - Cyanosis     AND    - Pulse ox <90% RA or <95% on O2   - pH <7.35 or >7.45   - pO2 < 60 mm Hg (or 10mm below COPD patient's baseline)   - pCO2 >50mm Hg (or 10mm above COPD patient's baseline)     Please clarify and document your clinical opinion in the progress notes and discharge summary including the definitive and/or presumptive diagnosis, (suspected or probable), related to the above clinical findings.  Please include clinical findings supporting your diagnosis  Thank you,[facility]  ext [phone] DR GUALBERTO EDMONDS [facility] RN

## 2018-02-06 NOTE — ED TRIAGE NOTES
Per pt \" I was at home and I couldn't breath\" Pt currently stable resting on stretcher with no distress noted with eyes closed easily aroused. Pt received lasix and has urinated 500 cc or urine. Pt states that he is feeling better.  Flu swab obtained per order, will continue to monitor

## 2018-02-06 NOTE — PROGRESS NOTES
Care Management Interventions  PCP Verified by CM: Yes (Dr. Vianey Platt)  Mode of Transport at Discharge: Other (see comment) (wife)  Transition of Care Consult (CM Consult): Discharge Planning (TBD)  Current Support Network: Lives with Spouse, Own Home (Pt lives with his wife and kids)  Confirm Follow Up Transport: Other (see comment) (Pt drives at baseline, but car is broken down at this time. Flyer provided on senior ride [employer]ige [employer])  Plan discussed with Pt/Family/Caregiver: Yes    Patient is a 60 yo male admitted for CHF. Per chart review, patient told MD he has a CPAP at night but does not use O2. Patient told this SW he uses CPAP at night and O2 \"when laying down\" but not any other time. [facility] provides DME. Patient states he lives with his wife and kids. He does not use any DME but does own a cane and walker. Patient drives at baseline, but currently does not have transportation as his care is broken down. The patient's spouse has a vehicle and will transport him from the hospital upon discharge. Patient was provided the flyer on the senior ride [employer]ige Haymaker for transportation to appointments if needed. Patient's current pulmonologist is Dr. Sandy Spencer at [facility]. He is requeting transfer to a more local office due to being unable to travel to that location.

## 2018-02-06 NOTE — PROGRESS NOTES
[facility] Hospitalist Group  Progress Note    Patient: Shilpi Hester Age: 61 y.o. : 1960 MR#: [id number] SSN: [id number]  Date: 2018     Subjective:     Feels better: less SOB, BLE edema down. Uses CPAP @ night, does not use O2. Denies F/C, N/V, CP. Reviewed chart. Pt states he takes low-dose ASA daily. Per outpt cardiology note, no Coumadin due to compliance issues, no Xarelto due to cost issues. Assessment/Plan:   1. Acute on chronic diastolic CHF exac - improved. Continue IV Lasix, pressure control. On ASA, BBlocker, ACEi as well. Will f/u echo. 2. HTN urgency - following BPs with resumption of anti-HTN meds. Will adjust as needed. 3. Chronic AFib - ASA. No Coumadin due to compliance issues, no Xarelto due to cost issues. 4. CADz hx - noted. Continue med tx as above. 5. Hx noncompliance - counseled. Additional Notes:      Case discussed with:  [x]Patient  []Family  []Nursing  []Case Management  DVT Prophylaxis:  []Lovenox  [x]Hep SQ  []SCDs  []Coumadin   []On Heparin gtt    Objective:   VS:   Visit Vitals    BP (!) 170/99    Pulse 80    Temp 97.2 °F (36.2 °C)    Resp 24    Ht 5' 11\" (1.803 m)    Wt 112.6 kg (248 lb 3.5 oz)    SpO2 92%    BMI 34.62 kg/m2      Tmax/24hrs: Temp (24hrs), Av.4 °F (36.3 °C), Min:97 °F (36.1 °C), Max:98.1 °F (36.7 °C)    Intake/Output Summary (Last 24 hours) at 18 1137  Last data filed at 18 1126   Gross per 24 hour   Intake              600 ml   Output              700 ml   Net             -100 ml       General:  Sleeping. Rouses easily to voice. Alert. NAD. Cardiovascular:  iRRR. Pulmonary:  CTA B with crackles BLLLF. GI:  Soft, NT/ND, NABS. Extremities:  No CT or edema.    Additional:      Labs:    Recent Results (from the past 24 hour(s))   CBC WITH AUTOMATED DIFF    Collection Time: 18  5:48 PM   Result Value Ref Range    WBC 7.3 4.6 - 13.2 K/uL    RBC 4.99 4.70 - 5.50 M/uL    HGB 14.3 13.0 - 16.0 g/dL    HCT 42.2 36.0 - 48.0 %    MCV 84.6 74.0 - 97.0 FL    MCH 28.7 24.0 - 34.0 PG    MCHC 33.9 31.0 - 37.0 g/dL    RDW 16.5 (H) 11.6 - 14.5 %    PLATELET 317 (L) 629 - 420 K/uL    NEUTROPHILS 66 40 - 73 %    LYMPHOCYTES 25 21 - 52 %    MONOCYTES 8 3 - 10 %    EOSINOPHILS 1 0 - 5 %    BASOPHILS 0 0 - 2 %    ABS. NEUTROPHILS 4.8 1.8 - 8.0 K/UL    ABS. LYMPHOCYTES 1.8 0.9 - 3.6 K/UL    ABS. MONOCYTES 0.6 0.05 - 1.2 K/UL    ABS. EOSINOPHILS 0.1 0.0 - 0.4 K/UL    ABS. BASOPHILS 0.0 0.0 - 0.06 K/UL    PLATELET COMMENTS LARGE PLATELETS      RBC COMMENTS ANISOCYTOSIS  1+        RBC COMMENTS TARGET CELLS  1+        RBC COMMENTS STOMATOCYTES  1+        DF AUTOMATED     METABOLIC PANEL, COMPREHENSIVE    Collection Time: 02/05/18  5:48 PM   Result Value Ref Range    Sodium 145 136 - 145 mmol/L    Potassium 4.0 3.5 - 5.5 mmol/L    Chloride 110 (H) 100 - 108 mmol/L    CO2 30 21 - 32 mmol/L    Anion gap 5 3.0 - 18 mmol/L    Glucose 98 74 - 99 mg/dL    BUN 13 7.0 - 18 MG/DL    Creatinine 0.87 0.6 - 1.3 MG/DL    BUN/Creatinine ratio 15 12 - 20      GFR est AA >60 >60 ml/min/1.73m2    GFR est non-AA >60 >60 ml/min/1.73m2    Calcium 9.9 8.5 - 10.1 MG/DL    Bilirubin, total 1.1 (H) 0.2 - 1.0 MG/DL    ALT (SGPT) 47 16 - 61 U/L    AST (SGOT) 37 15 - 37 U/L    Alk.  phosphatase 96 45 - 117 U/L    Protein, total 8.0 6.4 - 8.2 g/dL    Albumin 3.9 3.4 - 5.0 g/dL    Globulin 4.1 (H) 2.0 - 4.0 g/dL    A-G Ratio 1.0 0.8 - 1.7     CARDIAC PANEL,(CK, CKMB & TROPONIN)    Collection Time: 02/05/18  5:48 PM   Result Value Ref Range     39 - 308 U/L    CK - MB 2.4 <3.6 ng/ml    CK-MB Index 2.3 0.0 - 4.0 %    Troponin-I, Qt. 0.05 (H) 0.0 - 0.045 NG/ML   NT-PRO BNP    Collection Time: 02/05/18  5:48 PM   Result Value Ref Range    NT pro-BNP 3084 (H) 0 - 900 PG/ML   EKG, 12 LEAD, INITIAL    Collection Time: 02/05/18  7:09 PM   Result Value Ref Range    Ventricular Rate 86 BPM    Atrial Rate 90 BPM    QRS Duration 116 ms    Q-T Interval 390 ms    QTC Calculation (Bezet) 466 ms    Calculated R Axis -8 degrees    Calculated T Axis 60 degrees    Diagnosis       Atrial fibrillation  Left ventricular hypertrophy with QRS widening  Abnormal ECG  When compared with ECG of 18-NOV-2016 19:39,  No significant change was found  Confirmed by Heike Pro MD, Qing Jeffers ([id number]) on 2/6/2018 11:30:36 AM     POC G3    Collection Time: 02/05/18  7:24 PM   Result Value Ref Range    Device: NASAL CANNULA      Flow rate (POC) 3.0 L/M    pH (POC) 7.392 7.35 - 7.45      pCO2 (POC) 45.7 (H) 35.0 - 45.0 MMHG    pO2 (POC) 74 (L) 80 - 100 MMHG    HCO3 (POC) 27.8 (H) 22 - 26 MMOL/L    sO2 (POC) 94 92 - 97 %    Base excess (POC) 2 mmol/L    Allens test (POC) YES      Total resp.  rate 19      Site LEFT RADIAL      Specimen type (POC) ARTERIAL      Performed by Dina Nassar    INFLUENZA A & B AG (RAPID TEST)    Collection Time: 02/05/18  8:20 PM   Result Value Ref Range    Influenza A Antigen NEGATIVE  NEG      Influenza B Antigen NEGATIVE  NEG         Signed By: Prashanth Kim MD     February 6, 2018 11:37 AM

## 2018-02-06 NOTE — H&P
Hospitalist Admission Note    NAME: Grady Sutton   :  1960   MRN:  [id number]     Date/Time of admission:  2018 10:06 PM    Patient PCP: Jaylin Weeks MD  ________________________________________________________________________    My assessment of this patient's clinical condition and my plan of care is as follows. Assessment / Plan:  1. Acute hypoxic respiratory failure  2. Acute on chronic combined chf  3. Atrial fibrillation  4. Tobacco abuse    1. Admit to tele for diuresis and tele monitoring  2. Adding asa to regimen; unclear as to why pt not at least on that with afib  3. Will need strict I/O, fluid restriction, daily wts  4. Monitor renal function and lytes  5. nicoderm    Code Status: full  Surrogate Decision Maker: pt and wife    DVT Prophylaxis: sc hep  GI Prophylaxis: not indicated          Subjective:   CHIEF COMPLAINT: progressive sob    HISTORY OF PRESENT ILLNESS:     Tanmay Peralta is a 61 y.o.  male who presents with progressive sob over the past few weeks. Pt is diffusely anasarcic. States his sob became intolerable today. When he presented to the ED, he was noted to be in atrial fibrillation, but this is not new (was on last ekg). However, pt was not on any antiplatelet or AC. Additionally, pt clinically appeared significantly fluid overloaded. Had obvious orthopnea. Was given lasix in the ED and diuresed quickly, but still fairly symptomatic. We were asked to admit for work up and evaluation of the above problems. Past Medical History:   Diagnosis Date    Atrial fibrillation ([address] 5/6/15    LV EF 50% (may 5654)    Diastolic HF (heart failure) (HCC)     LV EF 50% (echo may 2015)    History of echocardiogram 2015    LVE. EF 50%. No WMA. Mod conc LVH. Indeterminate diastolic fx. Mild RVE. RVSP at least 37 mmHg. Severe LAE. Mod ROSEMARIE. Mod MR. Marked AV calcification vs vegetation. Mod TR.       Hypertension     Tobacco use Past Surgical History:   Procedure Laterality Date    HX HERNIA REPAIR      HX HIP REPLACEMENT Bilateral     right in March 2013, left May 2013       Social History   Substance Use Topics    Smoking status: Current Every Day Smoker     Packs/day: 0.50     Years: 40.00    Smokeless tobacco: Not on file      Comment: trying to quit    Alcohol use No        Family History   Problem Relation Age of Onset    Heart Failure Mother     Obesity Mother     Diabetes Father      No Known Allergies     Prior to Admission medications    Medication Sig Start Date End Date Taking? Authorizing Provider   lisinopril (PRINIVIL, ZESTRIL) 10 mg tablet Take 1 Tab by mouth daily. 1/15/18   Jim Hope, DO   furosemide (LASIX) 40 mg tablet Take 1 Tab by mouth daily. Or as directed 1/15/18   Jim Hope, DO   carvedilol (COREG) 25 mg tablet Take 1 Tab by mouth every twelve (12) hours. Indications: Chronic Heart Failure 1/15/18   Jim Hope, DO   methocarbamol (ROBAXIN) 500 mg tablet Take 1 Tab by mouth four (4) times daily. 10/27/16   URMILA Dang   nitroglycerin (NITROSTAT) 0.4 mg SL tablet 1 Tab by SubLINGual route every five (5) minutes as needed for Chest Pain. 5/9/15   Gae Bernheim, MD       REVIEW OF SYSTEMS:     I am not able to complete the review of systems because:    The patient is intubated and sedated    The patient has altered mental status due to his acute medical problems    The patient has baseline aphasia from prior stroke(s)    The patient has baseline dementia and is not reliable historian    The patient is in acute medical distress and unable to provide information           Total of 12 systems reviewed as follows:       POSITIVE= bolded text  Negative = text not underlined  General:  fever, chills, sweats, generalized weakness, weight loss/gain,      loss of appetite   Eyes:    blurred vision, eye pain, loss of vision, double vision  ENT:    rhinorrhea, pharyngitis   Respiratory:   cough, sputum production, SOB, DAVISON, wheezing, pleuritic pain   Cardiology:   chest pain, palpitations, orthopnea, PND, edema, syncope   Gastrointestinal:  abdominal pain , N/V, diarrhea, dysphagia, constipation, bleeding   Genitourinary:  frequency, urgency, dysuria, hematuria, incontinence   Muskuloskeletal :  arthralgia, myalgia, back pain  Hematology:  easy bruising, nose or gum bleeding, lymphadenopathy   Dermatological: rash, ulceration, pruritis, color change / jaundice  Endocrine:   hot flashes or polydipsia   Neurological:  headache, dizziness, confusion, focal weakness, paresthesia,     Speech difficulties, memory loss, gait difficulty  Psychological: Feelings of anxiety, depression, agitation    Objective:   VITALS:    Visit Vitals    BP (!) 171/119    Pulse 80    Temp 98.1 °F (36.7 °C)    Resp 25    Ht 5' 11\" (1.803 m)    Wt 113.4 kg (250 lb)    SpO2 100%    BMI 34.87 kg/m2       PHYSICAL EXAM:    General:    Alert, cooperative, moderate distress, appears stated age. HEENT: Atraumatic, anicteric sclerae, pink conjunctivae     No oral ulcers, mucosa moist, throat clear, dentition fair  Neck:  Supple, symmetrical,  thyroid: non tender  Lungs:   Clear to auscultation bilaterally. No Wheezing or Rhonchi. Positive rales diffusely. Chest wall:  No tenderness  No Accessory muscle use. Heart:   Regular  rhythm,  No  murmur   3+ edema  Abdomen:   Soft, non-tender. Not distended. Bowel sounds normal  Extremities: No cyanosis. No clubbing,      Skin turgor normal, Capillary refill normal, Radial dial pulse 2+  Skin:     Not pale. Not Jaundiced  No rashes   Psych:  Good insight. Not depressed. Not anxious or agitated. Neurologic: EOMs intact. No facial asymmetry. No aphasia or slurred speech. Symmetrical strength, Sensation grossly intact.  Alert and oriented X 4.     _______________________________________________________________________  Care Plan discussed with:    Comments   Patient x    Family  x wife   RN     Care Manager                    Consultant:      _______________________________________________________________________  Expected  Disposition:   Home with Family    HH/PT/OT/RN    SNF/LTC    [facility]    ________________________________________________________________________  TOTAL TIME:  48 Minutes    Critical Care Provided     Minutes non procedure based      Comments    x Reviewed previous records   >50% of visit spent in counseling and coordination of care x Discussion with patient and/or family and questions answered       ________________________________________________________________________      Procedures: see electronic medical records for all procedures/Xrays and details which were not copied into this note but were reviewed prior to creation of Plan. LAB DATA REVIEWED:    Recent Results (from the past 24 hour(s))   CBC WITH AUTOMATED DIFF    Collection Time: 02/05/18  5:48 PM   Result Value Ref Range    WBC 7.3 4.6 - 13.2 K/uL    RBC 4.99 4.70 - 5.50 M/uL    HGB 14.3 13.0 - 16.0 g/dL    HCT 42.2 36.0 - 48.0 %    MCV 84.6 74.0 - 97.0 FL    MCH 28.7 24.0 - 34.0 PG    MCHC 33.9 31.0 - 37.0 g/dL    RDW 16.5 (H) 11.6 - 14.5 %    PLATELET 411 (L) 700 - 420 K/uL    NEUTROPHILS 66 40 - 73 %    LYMPHOCYTES 25 21 - 52 %    MONOCYTES 8 3 - 10 %    EOSINOPHILS 1 0 - 5 %    BASOPHILS 0 0 - 2 %    ABS. NEUTROPHILS 4.8 1.8 - 8.0 K/UL    ABS. LYMPHOCYTES 1.8 0.9 - 3.6 K/UL    ABS. MONOCYTES 0.6 0.05 - 1.2 K/UL    ABS. EOSINOPHILS 0.1 0.0 - 0.4 K/UL    ABS.  BASOPHILS 0.0 0.0 - 0.06 K/UL    PLATELET COMMENTS LARGE PLATELETS      RBC COMMENTS ANISOCYTOSIS  1+        RBC COMMENTS TARGET CELLS  1+        RBC COMMENTS STOMATOCYTES  1+        DF AUTOMATED     METABOLIC PANEL, COMPREHENSIVE    Collection Time: 02/05/18  5:48 PM   Result Value Ref Range    Sodium 145 136 - 145 mmol/L    Potassium 4.0 3.5 - 5.5 mmol/L    Chloride 110 (H) 100 - 108 mmol/L    CO2 30 21 - 32 mmol/L    Anion gap 5 3.0 - 18 mmol/L Glucose 98 74 - 99 mg/dL    BUN 13 7.0 - 18 MG/DL    Creatinine 0.87 0.6 - 1.3 MG/DL    BUN/Creatinine ratio 15 12 - 20      GFR est AA >60 >60 ml/min/1.73m2    GFR est non-AA >60 >60 ml/min/1.73m2    Calcium 9.9 8.5 - 10.1 MG/DL    Bilirubin, total 1.1 (H) 0.2 - 1.0 MG/DL    ALT (SGPT) 47 16 - 61 U/L    AST (SGOT) 37 15 - 37 U/L    Alk. phosphatase 96 45 - 117 U/L    Protein, total 8.0 6.4 - 8.2 g/dL    Albumin 3.9 3.4 - 5.0 g/dL    Globulin 4.1 (H) 2.0 - 4.0 g/dL    A-G Ratio 1.0 0.8 - 1.7     CARDIAC PANEL,(CK, CKMB & TROPONIN)    Collection Time: 02/05/18  5:48 PM   Result Value Ref Range     39 - 308 U/L    CK - MB 2.4 <3.6 ng/ml    CK-MB Index 2.3 0.0 - 4.0 %    Troponin-I, Qt. 0.05 (H) 0.0 - 0.045 NG/ML   NT-PRO BNP    Collection Time: 02/05/18  5:48 PM   Result Value Ref Range    NT pro-BNP 3084 (H) 0 - 900 PG/ML   EKG, 12 LEAD, INITIAL    Collection Time: 02/05/18  7:09 PM   Result Value Ref Range    Ventricular Rate 86 BPM    Atrial Rate 90 BPM    QRS Duration 116 ms    Q-T Interval 390 ms    QTC Calculation (Bezet) 466 ms    Calculated R Axis -8 degrees    Calculated T Axis 60 degrees    Diagnosis       Atrial fibrillation  Left ventricular hypertrophy with QRS widening  Abnormal ECG  When compared with ECG of 18-NOV-2016 19:39,  No significant change was found     POC G3    Collection Time: 02/05/18  7:24 PM   Result Value Ref Range    Device: NASAL CANNULA      Flow rate (POC) 3.0 L/M    pH (POC) 7.392 7.35 - 7.45      pCO2 (POC) 45.7 (H) 35.0 - 45.0 MMHG    pO2 (POC) 74 (L) 80 - 100 MMHG    HCO3 (POC) 27.8 (H) 22 - 26 MMOL/L    sO2 (POC) 94 92 - 97 %    Base excess (POC) 2 mmol/L    Allens test (POC) YES      Total resp.  rate 19      Site LEFT RADIAL      Specimen type (POC) ARTERIAL      Performed by Bebeto Rouse    INFLUENZA A & B AG (RAPID TEST)    Collection Time: 02/05/18  8:20 PM   Result Value Ref Range    Influenza A Antigen NEGATIVE  NEG      Influenza B Antigen NEGATIVE  NEG Arturo Bruce MD  Internal Medicine  Hospitalist Division

## 2018-02-07 LAB
ANION GAP SERPL CALC-SCNC: 7 MMOL/L (ref 3–18)
BASOPHILS # BLD: 0 K/UL (ref 0–0.1)
BASOPHILS NFR BLD: 0 % (ref 0–2)
BUN SERPL-MCNC: 17 MG/DL (ref 7–18)
BUN/CREAT SERPL: 22 (ref 12–20)
CALCIUM SERPL-MCNC: 9.9 MG/DL (ref 8.5–10.1)
CHLORIDE SERPL-SCNC: 108 MMOL/L (ref 100–108)
CO2 SERPL-SCNC: 28 MMOL/L (ref 21–32)
CREAT SERPL-MCNC: 0.77 MG/DL (ref 0.6–1.3)
DIFFERENTIAL METHOD BLD: ABNORMAL
EOSINOPHIL # BLD: 0.1 K/UL (ref 0–0.4)
EOSINOPHIL NFR BLD: 1 % (ref 0–5)
ERYTHROCYTE [DISTWIDTH] IN BLOOD BY AUTOMATED COUNT: 15.9 % (ref 11.6–14.5)
GLUCOSE SERPL-MCNC: 102 MG/DL (ref 74–99)
HCT VFR BLD AUTO: 41.7 % (ref 36–48)
HGB BLD-MCNC: 13.9 G/DL (ref 13–16)
LYMPHOCYTES # BLD: 2 K/UL (ref 0.9–3.6)
LYMPHOCYTES NFR BLD: 25 % (ref 21–52)
MCH RBC QN AUTO: 28 PG (ref 24–34)
MCHC RBC AUTO-ENTMCNC: 33.3 G/DL (ref 31–37)
MCV RBC AUTO: 83.9 FL (ref 74–97)
MONOCYTES # BLD: 0.8 K/UL (ref 0.05–1.2)
MONOCYTES NFR BLD: 10 % (ref 3–10)
NEUTS SEG # BLD: 5.1 K/UL (ref 1.8–8)
NEUTS SEG NFR BLD: 64 % (ref 40–73)
PLATELET # BLD AUTO: 115 K/UL (ref 135–420)
POTASSIUM SERPL-SCNC: 3.8 MMOL/L (ref 3.5–5.5)
RBC # BLD AUTO: 4.97 M/UL (ref 4.7–5.5)
SODIUM SERPL-SCNC: 143 MMOL/L (ref 136–145)
WBC # BLD AUTO: 8 K/UL (ref 4.6–13.2)

## 2018-02-07 PROCEDURE — 36415 COLL VENOUS BLD VENIPUNCTURE: CPT | Performed by: FAMILY MEDICINE

## 2018-02-07 PROCEDURE — 90471 IMMUNIZATION ADMIN: CPT

## 2018-02-07 PROCEDURE — 74011250637 HC RX REV CODE- 250/637: Performed by: PHYSICIAN ASSISTANT

## 2018-02-07 PROCEDURE — 74011250636 HC RX REV CODE- 250/636: Performed by: HOSPITALIST

## 2018-02-07 PROCEDURE — 74011250637 HC RX REV CODE- 250/637: Performed by: INTERNAL MEDICINE

## 2018-02-07 PROCEDURE — 80048 BASIC METABOLIC PNL TOTAL CA: CPT | Performed by: FAMILY MEDICINE

## 2018-02-07 PROCEDURE — 74011250636 HC RX REV CODE- 250/636: Performed by: INTERNAL MEDICINE

## 2018-02-07 PROCEDURE — 90686 IIV4 VACC NO PRSV 0.5 ML IM: CPT | Performed by: HOSPITALIST

## 2018-02-07 PROCEDURE — 65660000000 HC RM CCU STEPDOWN

## 2018-02-07 PROCEDURE — 77010033678 HC OXYGEN DAILY: Performed by: INTERNAL MEDICINE

## 2018-02-07 PROCEDURE — 85025 COMPLETE CBC W/AUTO DIFF WBC: CPT | Performed by: FAMILY MEDICINE

## 2018-02-07 RX ORDER — ONDANSETRON 2 MG/ML
4 INJECTION INTRAMUSCULAR; INTRAVENOUS
Status: DISCONTINUED | OUTPATIENT
Start: 2018-02-07 | End: 2018-02-08 | Stop reason: HOSPADM

## 2018-02-07 RX ORDER — ACETAMINOPHEN 500 MG
500 TABLET ORAL
Status: DISCONTINUED | OUTPATIENT
Start: 2018-02-07 | End: 2018-02-08 | Stop reason: HOSPADM

## 2018-02-07 RX ORDER — LISINOPRIL 10 MG/1
10 TABLET ORAL ONCE
Status: COMPLETED | OUTPATIENT
Start: 2018-02-07 | End: 2018-02-07

## 2018-02-07 RX ORDER — LISINOPRIL 20 MG/1
20 TABLET ORAL DAILY
Status: DISCONTINUED | OUTPATIENT
Start: 2018-02-08 | End: 2018-02-08 | Stop reason: HOSPADM

## 2018-02-07 RX ADMIN — HEPARIN SODIUM 5000 UNITS: 5000 INJECTION, SOLUTION INTRAVENOUS; SUBCUTANEOUS at 11:01

## 2018-02-07 RX ADMIN — CARVEDILOL 25 MG: 25 TABLET, FILM COATED ORAL at 21:35

## 2018-02-07 RX ADMIN — LISINOPRIL 10 MG: 10 TABLET ORAL at 11:00

## 2018-02-07 RX ADMIN — HEPARIN SODIUM 5000 UNITS: 5000 INJECTION, SOLUTION INTRAVENOUS; SUBCUTANEOUS at 18:26

## 2018-02-07 RX ADMIN — CARVEDILOL 25 MG: 25 TABLET, FILM COATED ORAL at 08:59

## 2018-02-07 RX ADMIN — METHOCARBAMOL 500 MG: 500 TABLET ORAL at 09:00

## 2018-02-07 RX ADMIN — FUROSEMIDE 40 MG: 10 INJECTION, SOLUTION INTRAMUSCULAR; INTRAVENOUS at 18:26

## 2018-02-07 RX ADMIN — METHOCARBAMOL 500 MG: 500 TABLET ORAL at 18:26

## 2018-02-07 RX ADMIN — INFLUENZA VIRUS VACCINE 0.5 ML: 15; 15; 15; 15 SUSPENSION INTRAMUSCULAR at 11:01

## 2018-02-07 RX ADMIN — LISINOPRIL 10 MG: 10 TABLET ORAL at 08:59

## 2018-02-07 RX ADMIN — HEPARIN SODIUM 5000 UNITS: 5000 INJECTION, SOLUTION INTRAVENOUS; SUBCUTANEOUS at 02:50

## 2018-02-07 RX ADMIN — METHOCARBAMOL 500 MG: 500 TABLET ORAL at 21:35

## 2018-02-07 RX ADMIN — ASPIRIN 81 MG 81 MG: 81 TABLET ORAL at 09:00

## 2018-02-07 RX ADMIN — METHOCARBAMOL 500 MG: 500 TABLET ORAL at 15:18

## 2018-02-07 RX ADMIN — FUROSEMIDE 40 MG: 10 INJECTION, SOLUTION INTRAMUSCULAR; INTRAVENOUS at 09:00

## 2018-02-07 NOTE — ROUTINE PROCESS
Bedside shift change report given to Tegan RN (oncoming nurse) by Swapna Pollard (offgoing nurse). Report given with SBAR, Kardex, Intake/Output, MAR and Recent Results.

## 2018-02-07 NOTE — PROGRESS NOTES
[facility] - Progress Note  Admit Date: 2/5/2018    Assessment:     Hospital Problems  Date Reviewed: 1/3/2017          Codes Class Noted POA    CHF exacerbation ([address] ICD-10-CM: I50.9  ICD-9-CM: 428.0  2/5/2018 Unknown        CHF (congestive heart failure) ([facility]) ICD-10-CM: I50.9  ICD-9-CM: 428.0  11/19/2016 Unknown              -Acute on chronic diastolic heart failure. Mildly reduced EF 45-50% with moderate MR by echo this admission, unchanged c/w 2016. Unclear compliance with medical regimen.  -Hypertensive cardiomyopathy. Concentric LVH with mildly depressed function as appove  -Hypertension. uncontrolled with admission, SBP > 200 on arrival.  -Chronic atrial fibrillation on ASA and BB as outpatient. Unable to afford Xarelto in the past. Not candidate for warfarin given noncompliance issues with the medication in the past.  -Nonobstructive CAD with LCx vasospasm by cath 2008.  -Tobacco abuse. -H/o noncompliance.  -Thrombocytopenia.     Primary cardiologist Dr Cristo Delgado:     [place] Fair well with IV lasix. Improving symptoms but not baseline. BP suboptimal, continue coreg and lisinopril increased. Follow platelets. Needs continued CHF teaching. Subjective:     Breathing improved but not baseline.     Objective:      Patient Vitals for the past 8 hrs:   Temp Pulse Resp BP SpO2   02/07/18 0739 97.7 °F (36.5 °C) 68 20 (!) 148/104 100 %   02/07/18 0400 97.4 °F (36.3 °C) 80 18 (!) 146/93 98 %         Patient Vitals for the past 96 hrs:   Weight   02/07/18 0625 119 kg (262 lb 4.8 oz)   02/06/18 0630 112.6 kg (248 lb 3.5 oz)   02/05/18 1644 113.4 kg (250 lb)                    Intake/Output Summary (Last 24 hours) at 02/07/18 1019  Last data filed at 02/07/18 4791   Gross per 24 hour   Intake              720 ml   Output             2150 ml   Net            -1430 ml       Physical Exam:  General:  alert, cooperative, no distress, appears stated age  Neck:  no JVD  Lungs:  clear to auscultation bilaterally  Heart:  regular rate and rhythm  Abdomen:  abdomen is soft without significant tenderness, masses, organomegaly or guarding  Extremities:  extremities normal, atraumatic, no cyanosis trace edema    Data Review:     Labs: Results:       Chemistry Recent Labs      02/07/18   0520  02/05/18   1748   GLU  102*  98   NA  143  145   K  3.8  4.0   CL  108  110*   CO2  28  30   BUN  17  13   CREA  0.77  0.87   CA  9.9  9.9   AGAP  7  5   BUCR  22*  15   AP   --   96   TP   --   8.0   ALB   --   3.9   GLOB   --   4.1*   AGRAT   --   1.0      CBC w/Diff Recent Labs      02/07/18   0520  02/05/18   1748   WBC  8.0  7.3   RBC  4.97  4.99   HGB  13.9  14.3   HCT  41.7  42.2   PLT  115*  117*   GRANS  64  66   LYMPH  25  25   EOS  1  1      Cardiac Enzymes Lab Results   Component Value Date/Time    TROIQ 0.04 02/06/2018 10:30 AM      Coagulation No results for input(s): PTP, INR, APTT in the last 72 hours.     No lab exists for component: INREXT    Lipid Panel Lab Results   Component Value Date/Time    Cholesterol, total 128 05/06/2015 07:52 AM    HDL Cholesterol 35 (L) 05/06/2015 07:52 AM    LDL, calculated 73.2 05/06/2015 07:52 AM    VLDL, calculated 19.8 05/06/2015 07:52 AM    Triglyceride 99 05/06/2015 07:52 AM    CHOL/HDL Ratio 3.7 05/06/2015 07:52 AM      BNP No results found for: BNP, BNPP, XBNPT   Liver Enzymes Recent Labs      02/05/18   1748   TP  8.0   ALB  3.9   AP  96   SGOT  37      Digoxin    Thyroid Studies No results found for: T4, T3U, TSH, TSHEXT       Signed By: URMILA Lloyd     February 7, 2018

## 2018-02-07 NOTE — PROGRESS NOTES
conducted an initial consultation and Spiritual Assessment for Sofia Maciel, who is a 61 y. o.,male. Patients Primary Language is: [place]. According to the patients EMR Orthodox Affiliation is: Non Episcopal.     The reason the Patient came to the hospital is:   Patient Active Problem List    Diagnosis Date Noted    CHF exacerbation ([facility] 02/05/2018    CHF (congestive heart failure) ([facility] 11/19/2016    Shortness of breath 19/54/6828    Diastolic HF (heart failure) ([facility]     Hypertension     Tobacco use     Atrial fibrillation ([facility] 05/06/2015        The  provided the following Interventions:  Initiated a relationship of care and support. Explored issues of deng, belief, spirituality and Mu-ism/ritual needs while hospitalized. Listened empathically. Patient shared stories of his health journey and his Adventist involvement. Provided information about Spiritual Care Services. Offered prayer and assurance of continued prayers on patient's behalf. The following outcomes where achieved:  Patient shared limited information about both their medical narrative and spiritual journey/beliefs.  confirmed Patient's Orthodox Affiliation: Full [address] [place] [address]; [address]  Patient processed feeling about current hospitalization. Patient expressed gratitude for 's visit. Assessment:  Patient does not have any Mu-ism/cultural needs that will affect patients preferences in health care. There are no spiritual or Mu-ism issues which require intervention at this time. Plan:  Chaplains will continue to follow and will provide pastoral care on an as needed/requested basis.  recommends bedside caregivers page  on duty if patient shows signs of acute spiritual or emotional distress.       Mary Ortez, [address] [place] [address]  Spiritual Care  [phone]

## 2018-02-07 NOTE — PROGRESS NOTES
[facility]ist [facility]  Progress Note    Patient: Aung Wallace Age: 61 y.o. : 1960 MR#: [id number] SSN: [id number]  Date: 2018     Subjective:     Diuresing well with IV lasix. Breathing better but not baseline. Lisinopril dose increased per csi. He denies chest pain, breathing better. Appetite good. Denies constipation. He smokes, trying to cut back. Resides at home with wife and kids who do not smoke. nsg has been doing chf education. [place] [facility] Assessment/Plan:   1. Acute on chronic diastolic CHF exac - improving. Continue IV Lasix, pressure control. On ASA, BBlocker, ACEi as well. Echo noted. Cards input appreciated. 2. Hypertensive urgency improving. 3. Chronic AFib - ASA. No Coumadin due to compliance issues, no Xarelto due to cost issues. 4. CADz hx - noted. Continue med tx as above. 5. Hx noncompliance - counseled. 6. Thrombocytopenia - monitor on heparin  7. Tobacco abuse - smoking cessation education  8. Obesity Body mass index is 36.58 kg/(m^2). 9. dvt prophylaxis  10. Full code. xfer tele. Additional Notes:      Case discussed with:  [x]Patient  []Family  [x]Nursing  []Case Management  DVT Prophylaxis:  []Lovenox  [x]Hep SQ  []SCDs  []Coumadin   []On Heparin gtt    Objective:   VS:   Visit Vitals    /88    Pulse 85    Temp 97.4 °F (36.3 °C)    Resp 20    Ht 5' 11\" (1.803 m)    Wt 119 kg (262 lb 4.8 oz)    SpO2 100%    BMI 36.58 kg/m2      Tmax/24hrs: Temp (24hrs), Av.5 °F (36.4 °C), Min:97.4 °F (36.3 °C), Max:97.7 °F (36.5 °C)      Intake/Output Summary (Last 24 hours) at 18 1616  Last data filed at 18 1322   Gross per 24 hour   Intake              840 ml   Output             1150 ml   Net             -310 ml       General: awake alert and oriented x 4. Sitting up in chair. Ncat. perrl  Cardiovascular:  iRRR. Pulmonary: decreased bs at bases  GI:  Soft, NT/ND, NABS.    Extremities:  No edema. dp 2+ b.l  Neuro no focal deficit  SkiN: no rash    Labs:    Recent Results (from the past 24 hour(s))   CBC WITH AUTOMATED DIFF    Collection Time: 02/07/18  5:20 AM   Result Value Ref Range    WBC 8.0 4.6 - 13.2 K/uL    RBC 4.97 4.70 - 5.50 M/uL    HGB 13.9 13.0 - 16.0 g/dL    HCT 41.7 36.0 - 48.0 %    MCV 83.9 74.0 - 97.0 FL    MCH 28.0 24.0 - 34.0 PG    MCHC 33.3 31.0 - 37.0 g/dL    RDW 15.9 (H) 11.6 - 14.5 %    PLATELET 060 (L) 228 - 420 K/uL    NEUTROPHILS 64 40 - 73 %    LYMPHOCYTES 25 21 - 52 %    MONOCYTES 10 3 - 10 %    EOSINOPHILS 1 0 - 5 %    BASOPHILS 0 0 - 2 %    ABS. NEUTROPHILS 5.1 1.8 - 8.0 K/UL    ABS. LYMPHOCYTES 2.0 0.9 - 3.6 K/UL    ABS. MONOCYTES 0.8 0.05 - 1.2 K/UL    ABS. EOSINOPHILS 0.1 0.0 - 0.4 K/UL    ABS.  BASOPHILS 0.0 0.0 - 0.1 K/UL    DF AUTOMATED     METABOLIC PANEL, BASIC    Collection Time: 02/07/18  5:20 AM   Result Value Ref Range    Sodium 143 136 - 145 mmol/L    Potassium 3.8 3.5 - 5.5 mmol/L    Chloride 108 100 - 108 mmol/L    CO2 28 21 - 32 mmol/L    Anion gap 7 3.0 - 18 mmol/L    Glucose 102 (H) 74 - 99 mg/dL    BUN 17 7.0 - 18 MG/DL    Creatinine 0.77 0.6 - 1.3 MG/DL    BUN/Creatinine ratio 22 (H) 12 - 20      GFR est AA >60 >60 ml/min/1.73m2    GFR est non-AA >60 >60 ml/min/1.73m2    Calcium 9.9 8.5 - 10.1 MG/DL       Signed By: Russel Tompkins MD     February 7, 2018 11:37 AM

## 2018-02-08 ENCOUNTER — HOME HEALTH ADMISSION (OUTPATIENT)
Dept: HOME HEALTH SERVICES | Facility: HOME HEALTH | Age: 58
End: 2018-02-08

## 2018-02-08 VITALS
RESPIRATION RATE: 20 BRPM | TEMPERATURE: 97.5 F | BODY MASS INDEX: 36.89 KG/M2 | HEIGHT: 71 IN | OXYGEN SATURATION: 95 % | SYSTOLIC BLOOD PRESSURE: 130 MMHG | WEIGHT: 263.5 LBS | DIASTOLIC BLOOD PRESSURE: 85 MMHG | HEART RATE: 72 BPM

## 2018-02-08 LAB
ANION GAP SERPL CALC-SCNC: 5 MMOL/L (ref 3–18)
BASOPHILS # BLD: 0 K/UL (ref 0–0.1)
BASOPHILS NFR BLD: 0 % (ref 0–2)
BUN SERPL-MCNC: 18 MG/DL (ref 7–18)
BUN/CREAT SERPL: 19 (ref 12–20)
CALCIUM SERPL-MCNC: 9.7 MG/DL (ref 8.5–10.1)
CHLORIDE SERPL-SCNC: 105 MMOL/L (ref 100–108)
CO2 SERPL-SCNC: 29 MMOL/L (ref 21–32)
CREAT SERPL-MCNC: 0.93 MG/DL (ref 0.6–1.3)
DIFFERENTIAL METHOD BLD: ABNORMAL
EOSINOPHIL # BLD: 0.2 K/UL (ref 0–0.4)
EOSINOPHIL NFR BLD: 2 % (ref 0–5)
ERYTHROCYTE [DISTWIDTH] IN BLOOD BY AUTOMATED COUNT: 15.9 % (ref 11.6–14.5)
GLUCOSE SERPL-MCNC: 96 MG/DL (ref 74–99)
HCT VFR BLD AUTO: 38.9 % (ref 36–48)
HGB BLD-MCNC: 12.5 G/DL (ref 13–16)
LYMPHOCYTES # BLD: 3 K/UL (ref 0.9–3.6)
LYMPHOCYTES NFR BLD: 37 % (ref 21–52)
MAGNESIUM SERPL-MCNC: 2.2 MG/DL (ref 1.6–2.6)
MCH RBC QN AUTO: 27.2 PG (ref 24–34)
MCHC RBC AUTO-ENTMCNC: 32.1 G/DL (ref 31–37)
MCV RBC AUTO: 84.7 FL (ref 74–97)
MONOCYTES # BLD: 0.7 K/UL (ref 0.05–1.2)
MONOCYTES NFR BLD: 9 % (ref 3–10)
NEUTS SEG # BLD: 4.2 K/UL (ref 1.8–8)
NEUTS SEG NFR BLD: 52 % (ref 40–73)
PHOSPHATE SERPL-MCNC: 3.2 MG/DL (ref 2.5–4.9)
PLATELET # BLD AUTO: 106 K/UL (ref 135–420)
POTASSIUM SERPL-SCNC: 3.5 MMOL/L (ref 3.5–5.5)
RBC # BLD AUTO: 4.59 M/UL (ref 4.7–5.5)
SODIUM SERPL-SCNC: 139 MMOL/L (ref 136–145)
WBC # BLD AUTO: 8 K/UL (ref 4.6–13.2)

## 2018-02-08 PROCEDURE — 74011250636 HC RX REV CODE- 250/636: Performed by: INTERNAL MEDICINE

## 2018-02-08 PROCEDURE — 36415 COLL VENOUS BLD VENIPUNCTURE: CPT | Performed by: HOSPITALIST

## 2018-02-08 PROCEDURE — 83735 ASSAY OF MAGNESIUM: CPT | Performed by: HOSPITALIST

## 2018-02-08 PROCEDURE — 74011250637 HC RX REV CODE- 250/637: Performed by: INTERNAL MEDICINE

## 2018-02-08 PROCEDURE — 74011250637 HC RX REV CODE- 250/637: Performed by: PHYSICIAN ASSISTANT

## 2018-02-08 PROCEDURE — 84100 ASSAY OF PHOSPHORUS: CPT | Performed by: HOSPITALIST

## 2018-02-08 PROCEDURE — 80048 BASIC METABOLIC PNL TOTAL CA: CPT | Performed by: HOSPITALIST

## 2018-02-08 PROCEDURE — 85025 COMPLETE CBC W/AUTO DIFF WBC: CPT | Performed by: HOSPITALIST

## 2018-02-08 RX ORDER — GUAIFENESIN 100 MG/5ML
81 LIQUID (ML) ORAL DAILY
Qty: 30 TAB | Refills: 11 | Status: ON HOLD | OUTPATIENT
Start: 2018-02-09 | End: 2018-10-29 | Stop reason: SDUPTHER

## 2018-02-08 RX ORDER — LISINOPRIL 20 MG/1
20 TABLET ORAL DAILY
Qty: 30 TAB | Refills: 2 | Status: ON HOLD | OUTPATIENT
Start: 2018-02-08 | End: 2018-03-20

## 2018-02-08 RX ORDER — NITROGLYCERIN 0.4 MG/1
0.4 TABLET SUBLINGUAL
Qty: 1 BOTTLE | Refills: 1 | Status: SHIPPED | OUTPATIENT
Start: 2018-02-08

## 2018-02-08 RX ORDER — CARVEDILOL 25 MG/1
25 TABLET ORAL EVERY 12 HOURS
Qty: 60 TAB | Refills: 2 | Status: SHIPPED | OUTPATIENT
Start: 2018-02-08 | End: 2018-04-27

## 2018-02-08 RX ORDER — FUROSEMIDE 40 MG/1
40 TABLET ORAL
Qty: 60 TAB | Refills: 2 | Status: SHIPPED | OUTPATIENT
Start: 2018-02-08 | End: 2018-02-13 | Stop reason: SDUPTHER

## 2018-02-08 RX ORDER — POTASSIUM CHLORIDE 20 MEQ/1
20 TABLET, EXTENDED RELEASE ORAL 2 TIMES DAILY
Status: DISCONTINUED | OUTPATIENT
Start: 2018-02-08 | End: 2018-02-08 | Stop reason: HOSPADM

## 2018-02-08 RX ORDER — FUROSEMIDE 40 MG/1
40 TABLET ORAL
Status: DISCONTINUED | OUTPATIENT
Start: 2018-02-08 | End: 2018-02-08 | Stop reason: HOSPADM

## 2018-02-08 RX ORDER — POTASSIUM CHLORIDE 20 MEQ/1
20 TABLET, EXTENDED RELEASE ORAL 2 TIMES DAILY WITH MEALS
Qty: 60 TAB | Refills: 2 | Status: SHIPPED | OUTPATIENT
Start: 2018-02-08 | End: 2018-02-13 | Stop reason: SDUPTHER

## 2018-02-08 RX ADMIN — CARVEDILOL 25 MG: 25 TABLET, FILM COATED ORAL at 09:26

## 2018-02-08 RX ADMIN — HEPARIN SODIUM 5000 UNITS: 5000 INJECTION, SOLUTION INTRAVENOUS; SUBCUTANEOUS at 03:32

## 2018-02-08 RX ADMIN — HEPARIN SODIUM 5000 UNITS: 5000 INJECTION, SOLUTION INTRAVENOUS; SUBCUTANEOUS at 11:32

## 2018-02-08 RX ADMIN — ASPIRIN 81 MG 81 MG: 81 TABLET ORAL at 09:26

## 2018-02-08 RX ADMIN — POTASSIUM CHLORIDE 20 MEQ: 20 TABLET, EXTENDED RELEASE ORAL at 15:02

## 2018-02-08 RX ADMIN — METHOCARBAMOL 500 MG: 500 TABLET ORAL at 15:02

## 2018-02-08 RX ADMIN — METHOCARBAMOL 500 MG: 500 TABLET ORAL at 09:26

## 2018-02-08 RX ADMIN — LISINOPRIL 20 MG: 20 TABLET ORAL at 09:26

## 2018-02-08 RX ADMIN — FUROSEMIDE 40 MG: 10 INJECTION, SOLUTION INTRAMUSCULAR; INTRAVENOUS at 09:26

## 2018-02-08 NOTE — PROGRESS NOTES
SW DISCHARGE NOTE: Pt is being discharged home. Pt has an order for [facility] and an order has been written. Pt reported his wife will transport him home. Care Management Interventions  PCP Verified by CM: Yes (Dr. Abdiaziz Jordan)  Mode of Transport at Discharge: [facility] Transport Time of Discharge: [phone]  Transition of Care Consult (CM Consult): Discharge Planning (TBD)  Current Support Network: Lives with Spouse, Own Home (Pt lives with his wife and kids)  Confirm Follow Up Transport: Family (Pt reported his wife would provide discharge transportation)  Plan discussed with Pt/Family/Caregiver: Yes  Discharge Location  Discharge Placement: Home    SW will be available for discharge planning.      CHRISTIAN Ibarra LSW

## 2018-02-08 NOTE — DISCHARGE INSTRUCTIONS
Learning About Heart Failure Zones  What are heart failure zones? Heart failure zones give you an easy way to see changes in your heart failure symptoms. They also tell you when you need to get help. Check every day to see which zone you are in. Green zone. You are doing well. This is where you want to be. · Your weight is stable. This means it is not going up or down. · You breathe easily. · You are sleeping well. You are able to lie flat without shortness of breath. · You can do your usual activities. Yellow zone. Be careful. Your symptoms are changing. Call your doctor. · You have new or increased shortness of breath. · You are dizzy or lightheaded, or you feel like you may faint. · You have sudden weight gain, such as more than 2 to 3 pounds in a day or 5 pounds in a week. (Your doctor may suggest a different range of weight gain.)  · You have increased swelling in your legs, ankles, or feet. · You are so tired or weak that you cannot do your usual activities. · You are not sleeping well. Shortness of breath wakes you up at night. You need extra pillows. Your doctor's name: ____________________________________________________________  Your doctor's contact information: _________________________________________________  Red zone. This is an emergency. Call 911. You have symptoms of sudden heart failure, such as:  · You have severe trouble breathing. · You cough up pink, foamy mucus. · You have a new irregular or fast heartbeat. You have symptoms of a heart attack. These may include:  · Chest pain or pressure, or a strange feeling in the chest.  · Sweating. · Shortness of breath. · Nausea or vomiting. · Pain, pressure, or a strange feeling in the back, neck, jaw, or upper belly or in one or both shoulders or arms. · Lightheadedness or sudden weakness. · A fast or irregular heartbeat.   If you have symptoms of a heart attack: After you call 911, the  may tell you to chew 1 adult-strength or 2 to 4 low-dose aspirin. Wait for an ambulance. Do not try to drive yourself. Follow-up care is a key part of your treatment and safety. Be sure to make and go to all appointments, and call your doctor if you are having problems. It's also a good idea to know your test results and keep a list of the medicines you take. Where can you learn more? Go to http://aixa-lizet.info/. Enter T174 in the search box to learn more about \"Learning About Heart Failure Zones. \"  Current as of: September 21, 2016  Content Version: 11.4  © 2062-9020 [employer]. Care instructions adapted under license by [employer] (which disclaims liability or warranty for this information). If you have questions about a medical condition or this instruction, always ask your healthcare professional. [place] [address] any warranty or liability for your use of this information. Heart Rhythm Problems in Heart Failure: Care Instructions  Your Care Instructions    A heart rhythm problem, or arrhythmia, is a change in the normal rhythm of your heart. Your heart may beat too fast or too slow or beat with an irregular or skipping rhythm. A change in the heart's rhythm may feel like a really strong heartbeat or a fluttering in your chest. A severe heart rhythm problem can keep the body from getting the blood it needs. This can cause shortness of breath, lightheadedness, and fainting. A heart rhythm problem can make your heart failure worse and increase your chance of dying suddenly. You may take medicine to treat your condition. Your doctor may recommend a pacemaker, an implantable cardioverter-defibrillator (ICD), or a procedure called catheter ablation to destroy small parts of the heart that are causing a rhythm problem. Follow-up care is a key part of your treatment and safety.  Be sure to make and go to all appointments, and call your doctor if you are having problems. It's also a good idea to know your test results and keep a list of the medicines you take. How can you care for yourself at home? · Take your medicines exactly as prescribed. Talk to your doctor if you have any problems with your medicines. · If you received a pacemaker or a defibrillator, you will get a fact sheet about it. · Wear a medical alert ID bracelet. You can buy one at most drugstores or order it on the Internet. · Make sure you go to your follow-up appointments. To change your lifestyle  · Do not smoke. · Eat a heart-healthy diet. · Do not drink too much alcohol. Also, get enough sleep, and do not overeat. · Ask your doctor whether you can take over-the-counter medicines (such as decongestants). These can make your heart beat fast.  Be active  · Start light exercise if your doctor says you can. Even a small amount will help you get stronger, have more energy, and manage your stress. · Walk to get exercise easily. Start by walking a little more than you did the day before. Bit by bit, increase the amount you walk. · When you exercise, watch for signs that your heart is working too hard. You are pushing too hard if you cannot talk while you exercise. If you become short of breath or dizzy or have chest pain, sit down and rest.  · If your doctor has not set you up with a cardiac rehabilitation (rehab) program, talk to him or her about whether that is right for you. Cardiac rehab includes exercise, help with diet and lifestyle changes, and emotional support. It may reduce your risk of future heart problems. · Check your pulse daily. Place two fingers on the artery at the palm side of your wrist, in line with your thumb. If your heartbeat seems uneven, talk to your doctor. When should you call for help? Call 911 if you have symptoms of sudden heart failure, such as:  ? · You have severe trouble breathing. ? · You cough up pink, foamy mucus.    ? · You have a new irregular or rapid heartbeat. ?Call 911 if you have symptoms of a heart attack. These may include:  ? · Chest pain or pressure, or a strange feeling in the chest.   ? · Sweating. ? · Shortness of breath. ? · Nausea or vomiting. ? · Pain, pressure, or a strange feeling in the back, neck, jaw, or upper belly or in one or both shoulders or arms. ? · Lightheadedness or sudden weakness. ? · A fast or irregular heartbeat. ? After you call 911, the  may tell you to chew 1 adult-strength or 2 to 4 low-dose aspirin. Wait for an ambulance. Do not try to drive yourself. ?Call your doctor now or seek immediate medical care if:  ? · You have new or increased shortness of breath. ? · You are dizzy or lightheaded, or you feel like you may faint. ? · You have sudden weight gain, such as more than 2 to 3 pounds in a day or 5 pounds in a week. (Your doctor may suggest a different range of weight gain.)   ? · You have increased swelling in your legs, ankles, or feet. ? · You are suddenly so tired or weak that you cannot do your usual activities. ? Watch closely for changes in your health, and be sure to contact your doctor if you develop new symptoms. Where can you learn more? Go to http://aixa-lizet.info/. Enter B751 in the search box to learn more about \"Heart Rhythm Problems in Heart Failure: Care Instructions. \"  Current as of: September 21, 2016  Content Version: 11.4  © 4475-2460 [employer]. Care instructions adapted under license by [employer] (which disclaims liability or warranty for this information). If you have questions about a medical condition or this instruction, always ask your healthcare professional. [address] any warranty or liability for your use of this information. Avoiding Triggers With Heart Failure: Care Instructions  Your Care Instructions    Triggers are anything that make your heart failure flare up.  A flare-up is also called \"sudden heart failure\" or \"acute heart failure. \" When you have a flare-up, fluid builds up in your lungs, and you have problems breathing. You might need to go to the hospital. By watching for changes in your condition and avoiding triggers, you can prevent heart failure flare-ups. Follow-up care is a key part of your treatment and safety. Be sure to make and go to all appointments, and call your doctor if you are having problems. It's also a good idea to know your test results and keep a list of the medicines you take. How can you care for yourself at home? Watch for changes in your weight and condition  · Weigh yourself without clothing at the same time each day. Record your weight. Call your doctor if you have sudden weight gain, such as more than 2 to 3 pounds in a day or 5 pounds in a week. (Your doctor may suggest a different range of weight gain.) A sudden weight gain may mean that your heart failure is getting worse. · Keep a daily record of your symptoms. Write down any changes in how you feel, such as new shortness of breath, cough, or problems eating. Also record if your ankles are more swollen than usual and if you feel more tired than usual. Note anything that you ate or did that could have triggered these changes. Limit sodium  Sodium causes your body to hold on to extra water. This may cause your heart failure symptoms to get worse. People get most of their sodium from processed foods. Fast food and restaurant meals also tend to be very high in sodium. · Your doctor may suggest that you limit sodium to 2,000 milligrams (mg) a day or less. That is less than 1 teaspoon of salt a day, including all the salt you eat in cooking or in packaged foods. · Read food labels on cans and food packages. They tell you how much sodium you get in one serving. Check the serving size. If you eat more than one serving, you are getting more sodium.   · Be aware that sodium can come in forms other than salt, including monosodium glutamate (MSG), sodium citrate, and sodium bicarbonate (baking soda). MSG is often added to Asian food. You can sometimes ask for food without MSG or salt. · Slowly reducing salt will help you adjust to the taste. Take the salt shaker off the table. · Flavor your food with garlic, lemon juice, onion, vinegar, herbs, and spices instead of salt. Do not use soy sauce, steak sauce, onion salt, garlic salt, mustard, or ketchup on your food, unless it is labeled \"low-sodium\" or \"low-salt. \"  · Make your own salad dressings, sauces, and ketchup without adding salt. · Use fresh or frozen ingredients, instead of canned ones, whenever you can. Choose low-sodium canned goods. · Eat less processed food and food from restaurants, including fast food. Exercise as directed  Moderate, regular exercise is very good for your heart. It improves your blood flow and helps control your weight. But too much exercise can stress your heart and cause a heart failure flare-up. · Check with your doctor before you start an exercise program.  · Walking is an easy way to get exercise. Start out slowly. Gradually increase the length and pace of your walk. Swimming, riding a bike, and using a treadmill are also good forms of exercise. · When you exercise, watch for signs that your heart is working too hard. You are pushing yourself too hard if you cannot talk while you are exercising. If you become short of breath or dizzy or have chest pain, stop, sit down, and rest.  · Do not exercise when you do not feel well. Take medicines correctly  · Take your medicines exactly as prescribed. Call your doctor if you think you are having a problem with your medicine. · Make a list of all the medicines you take. Include those prescribed to you by other doctors and any over-the-counter medicines, vitamins, or supplements you take. Take this list with you when you go to any doctor.   · Take your medicines at the same time every day. It may help you to post a list of all the medicines you take every day and what time of day you take them. · Make taking your medicine as simple as you can. Plan times to take your medicines when you are doing other things, such as eating a meal or getting ready for bed. This will make it easier to remember to take your medicines. · Get organized. Use helpful tools, such as daily or weekly pill containers. When should you call for help? Call 911 if you have symptoms of sudden heart failure such as:  ? · You have severe trouble breathing. ? · You cough up pink, foamy mucus. ? · You have a new irregular or rapid heartbeat. ?Call your doctor now or seek immediate medical care if:  ? · You have new or increased shortness of breath. ? · You are dizzy or lightheaded, or you feel like you may faint. ? · You have sudden weight gain, such as more than 2 to 3 pounds in a day or 5 pounds in a week. (Your doctor may suggest a different range of weight gain.)   ? · You have increased swelling in your legs, ankles, or feet. ? · You are suddenly so tired or weak that you cannot do your usual activities. ? Watch closely for changes in your health, and be sure to contact your doctor if you develop new symptoms. Where can you learn more? Go to http://aixa-lizet.info/. Enter E989 in the search box to learn more about \"Avoiding Triggers With Heart Failure: Care Instructions. \"  Current as of: September 21, 2016  Content Version: 11.4  © 5004-0988 [employer]. Care instructions adapted under license by [employer] (which disclaims liability or warranty for this information). If you have questions about a medical condition or this instruction, always ask your healthcare professional. [place] [address] any warranty or liability for your use of this information. Patient armband removed and shredded.       DISCHARGE SUMMARY from Nurse    PATIENT INSTRUCTIONS:    After general anesthesia or intravenous sedation, for 24 hours or while taking prescription Narcotics:  · Limit your activities  · Do not drive and operate hazardous machinery  · Do not make important personal or business decisions  · Do  not drink alcoholic beverages  · If you have not urinated within 8 hours after discharge, please contact your surgeon on call. Report the following to your surgeon:  · Excessive pain, swelling, redness or odor of or around the surgical area  · Temperature over 100.5  · Nausea and vomiting lasting longer than 4 hours or if unable to take medications  · Any signs of decreased circulation or nerve impairment to extremity: change in color, persistent  numbness, tingling, coldness or increase pain  · Any questions    What to do at Home:  Recommended activity: Activity as tolerated. If you experience any of the following symptoms chest pain, shortness of breath, leg swelling unrelieved by diuretics (lasix), please follow up with Emergency Department. *  Please give a list of your current medications to your Primary Care Provider. *  Please update this list whenever your medications are discontinued, doses are      changed, or new medications (including over-the-counter products) are added. *  Please carry medication information at all times in case of emergency situations. These are general instructions for a healthy lifestyle:    No smoking/ No tobacco products/ Avoid exposure to second hand smoke  Surgeon General's Warning:  Quitting smoking now greatly reduces serious risk to your health.     Obesity, smoking, and sedentary lifestyle greatly increases your risk for illness    A healthy diet, regular physical exercise & weight monitoring are important for maintaining a healthy lifestyle    You may be retaining fluid if you have a history of heart failure or if you experience any of the following symptoms:  Weight gain of 3 pounds or more overnight or 5 pounds in a week, increased swelling in our hands or feet or shortness of breath while lying flat in bed. Please call your doctor as soon as you notice any of these symptoms; do not wait until your next office visit. Recognize signs and symptoms of STROKE:    F-face looks uneven    A-arms unable to move or move unevenly    S-speech slurred or non-existent    T-time-call 911 as soon as signs and symptoms begin-DO NOT go       Back to bed or wait to see if you get better-TIME IS BRAIN. Warning Signs of HEART ATTACK     Call 911 if you have these symptoms:   Chest discomfort. Most heart attacks involve discomfort in the center of the chest that lasts more than a few minutes, or that goes away and comes back. It can feel like uncomfortable pressure, squeezing, fullness, or pain.  Discomfort in other areas of the upper body. Symptoms can include pain or discomfort in one or both arms, the back, neck, jaw, or stomach.  Shortness of breath with or without chest discomfort.  Other signs may include breaking out in a cold sweat, nausea, or lightheadedness. Don't wait more than five minutes to call 911 - MINUTES MATTER! Fast action can save your life. Calling 911 is almost always the fastest way to get lifesaving treatment. Emergency Medical Services staff can begin treatment when they arrive -- up to an hour sooner than if someone gets to the hospital by car. The discharge information has been reviewed with the patient. The patient verbalized understanding. Discharge medications reviewed with the patient and appropriate educational materials and side effects teaching were provided.   ___________________________________________________________________________________________________________________________________

## 2018-02-08 NOTE — PROGRESS NOTES
Bedside and Verbal shift change report given to Vinod Sinha RN (oncoming nurse) by Teresa Banegas (offgoing nurse). Report included the following information SBAR, Kardex, MAR and Recent Results.

## 2018-02-08 NOTE — ROUTINE PROCESS
Bedside and Verbal shift change report given to Brittany Chua (oncoming nurse) by Zuleima Kramer (offgoing nurse). Report included the following information SBAR, Kardex, MAR and Recent Results. SITUATION:  Code Status: Full Code  Reason for Admission: CHF exacerbation ([facility]  CHF (congestive heart failure) [facility])  Hospital day: 3  Problem List:       Hospital Problems  Date Reviewed: 1/3/2017          Codes Class Noted POA    CHF exacerbation ([facility] ICD-10-CM: I50.9  ICD-9-CM: 428.0  2/5/2018 Unknown        CHF (congestive heart failure) ([facility] ICD-10-CM: I50.9  ICD-9-CM: 428.0  11/19/2016 Unknown              BACKGROUND:   Past Medical History:   Past Medical History:   Diagnosis Date    Atrial fibrillation ([facility] 5/6/15    LV EF 50% (may 9150)    Diastolic HF [facility]heart failure) ([facility]     LV EF 50% (echo may 2015)    History of echocardiogram 05/07/2015    LVE. EF 50%. No WMA. Mod conc LVH. Indeterminate diastolic fx. Mild RVE. RVSP at least 37 mmHg. Severe LAE. Mod ROSEMARIE. Mod MR. Marked AV calcification vs vegetation. Mod TR.  Hypertension     Tobacco use       Patient taking anticoagulants yes    Patient has a defibrillator: no    History of shots NO for example, flu, pneumonia, tetanus   Isolation History NO for example, MRSA, CDiff    ASSESSMENT:  Changes in Assessment Throughout Shift:   Significant Changes in 24 hours (for example, RR/code, fall)  Patient has Central Line: no Reasons if yes:   Patient has Ascencio Cath: no Reasons if yes:     Mobility Issues  PT  IV Patency  OR Checklist  Pending Tests    Last Vitals:  Vitals w/ MEWS Score (last day)     Date/Time MEWS Score Pulse Resp Temp BP Level of Consciousness SpO2    02/08/18 0350 1 80 20 97.5 °F (36.4 °C) 142/90 Alert 98 %    02/07/18 2221 1 80 18 97.5 °F (36.4 °C) 127/88 Alert 93 %    02/07/18 2020 1 84 17 97.3 °F (36.3 °C) 145/85 Alert 92 %    02/07/18 1627 -- -- -- -- (!)  154/113 -- --    02/07/18 1622 1 75 20 97.5 °F (36.4 °C) (!) 158/113 Alert 98 %    02/07/18 1133 1 85 20 97.4 °F (36.3 °C) 153/88 Alert 100 %    02/07/18 0739 1 68 20 97.7 °F (36.5 °C) (!)  148/104 Alert 100 %    02/07/18 0400 1 80 18 97.4 °F (36.3 °C) (!)  146/93 Alert 98 %            PAIN    Pain Assessment    Pain Intensity 1: 0 (02/08/18 0100)              Patient Stated Pain Goal: 1  Intervention effective: no  Time of last intervention:  Reassessment Completed: yes   Other actions taken for pain:     Last 3 Weights:  Last 3 Recorded Weights in this Encounter    02/06/18 0630 02/07/18 0625 02/08/18 0422   Weight: 112.6 kg (248 lb 3.5 oz) 119 kg (262 lb 4.8 oz) 119.5 kg (263 lb 8 oz)   Weight change: 0.544 kg (1 lb 3.2 oz)    INTAKE/OUPUT    Current Shift: 02/07 1901 - 02/08 0700  In: 240 [P.O.:240]  Out: 1250 [Urine:1250]    Last three shifts: 02/06 0701 - 02/07 1900  In: 1680 [P.O.:1680]  Out: 2900 [Urine:2900]    RECOMMENDATIONS AND DISCHARGE PLANNING  Patient needs and requests:     Pending tests/procedures:      Discharge plan for patient:     Discharge planning Needs or Barriers:     Estimated Discharge Date: TBD Posted on Whiteboard in Patients Room: no       \"HEALS\" SAFETY CHECK  A safety check occurred in the patient's room between off going nurse and oncoming nurse listed above. The safety check included the below items:    H  High Alert Medications Verify all high alert medication drips (heparin, PCA, etc.)  E  Equipment Suction is set up for ALL patients (with yanker)  Red plugs utilized for all equipment (IV pumps, etc.)  WOWs wiped down at end of shift. Room stocked with oxygen, suction, and other unit-specific supplies  A  Alarms Bed alarm is set for fall risk patients  Ensure chair alarm is in place and activated if patient is up in a chair  L  Lines Check IV for any infiltration  Ascencio bag is empty if patient has a Ascencio   Tubing and IV bags are labeled  S  Safety  Room is clean, patient is clean, and equipment is clean.   Hallways are clear from equipment besides carts. Fall bracelet on for fall risk patients  Ensure room is clear and free of clutter  Suction is set up for ALL patients (with lonnie)  Hallways are clear from equipment besides carts.    Isolation precautions followed, supplies available outside room, sign posted    Jeremiah Obrien

## 2018-02-08 NOTE — PROGRESS NOTES
Problem: Falls - Risk of  Goal: *Absence of Falls  Document Neda Fall Risk and appropriate interventions in the flowsheet.    Outcome: Resolved/Met Date Met: 02/08/18  Fall Risk Interventions:  Mobility Interventions: Patient to call before getting OOB         Medication Interventions: Patient to call before getting OOB, Teach patient to arise slowly

## 2018-02-08 NOTE — ROUTINE PROCESS
TRANSFER - OUT REPORT:    Verbal report given to Praveen Blair RN(name) on Danyel Swain  being transferred to (unit) for routine progression of care       Report consisted of patients Situation, Background, Assessment and   Recommendations(SBAR). Information from the following report(s) SBAR, Kardex, Intake/Output, MAR, Accordion, Recent Results and Cardiac Rhythm Afib was reviewed with the receiving nurse. Lines:   Peripheral IV 11/18/16 Right Antecubital (Active)       Peripheral IV 02/07/18 Left Arm (Active)   Site Assessment Clean, dry, & intact 2/7/2018 10:44 PM   Phlebitis Assessment 0 2/7/2018 10:44 PM   Infiltration Assessment 0 2/7/2018 10:44 PM   Dressing Status Clean, dry, & intact 2/7/2018 10:44 PM   Dressing Type Transparent;Tape 2/7/2018 10:44 PM   Hub Color/Line Status Blue 2/7/2018 10:44 PM        Opportunity for questions and clarification was provided.       Patient transported with:   TELOS w/c

## 2018-02-08 NOTE — HOME CARE
[facility] received referral for H2H/CHF. This nurse verified address and contact number for the patient. The patient is not homebound per our conversation. DME: per the patient, he has a cane and walker at home but does not require use of any AD; also, the patient uses a CPAP at  and states he does have home O2 through [employer]. Referral called to central intake for completion and visit scheduling - AYAD Hou LPN

## 2018-02-08 NOTE — PROGRESS NOTES
[facility] - Progress Note  Admit Date: 2/5/2018    Assessment:     Hospital Problems  Date Reviewed: 1/3/2017          Codes Class Noted POA    CHF exacerbation ([facility] [address] ICD-10-CM: I50.9  ICD-9-CM: 428.0  2/5/2018 Unknown        CHF (congestive heart failure) (formerly [facility]) ICD-10-CM: I50.9  ICD-9-CM: 428.0  11/19/2016 Unknown              -Acute on chronic diastolic heart failure. Mildly reduced EF 45-50% with moderate MR by echo this admission, unchanged c/w 2016. Unclear compliance with medical regimen.  -Hypertensive cardiomyopathy.  Concentric LVH with mildly depressed function as appove  -Hypertension.  uncontrolled with admission, SBP > 200 on arrival.  -Chronic atrial fibrillation on ASA and BB as outpatient. Unable to afford Xarelto in the past. Not candidate for warfarin given noncompliance issues with the medication in the past.  -Nonobstructive CAD with LCx vasospasm by cath 2008.  -Tobacco abuse. -H/o noncompliance.  -Thrombocytopenia.      Primary cardiologist Dr Clement Lerner.     Plan:     Continues to diurese well with IV lasix with stable renal function, will continue for today. Hemodynamics and rate stable overall on coreg and lisinopril, will continue. Continue ASA. Continue education. Subjective:     Continues to feel better.     Objective:      Patient Vitals for the past 8 hrs:   Temp Pulse Resp BP SpO2   02/08/18 0721 97.7 °F (36.5 °C) 60 20 125/82 99 %   02/08/18 0350 97.5 °F (36.4 °C) 80 20 142/90 98 %         Patient Vitals for the past 96 hrs:   Weight   02/08/18 0422 119.5 kg (263 lb 8 oz)   02/07/18 0625 119 kg (262 lb 4.8 oz)   02/06/18 0630 112.6 kg (248 lb 3.5 oz)   02/05/18 1644 113.4 kg (250 lb)                    Intake/Output Summary (Last 24 hours) at 02/08/18 1042  Last data filed at 02/08/18 0057   Gross per 24 hour   Intake              600 ml   Output             2000 ml   Net            -1400 ml       Physical Exam:  General:  alert, cooperative, no distress, appears stated age  Neck:  no JVD  Lungs:  clear to auscultation bilaterally  Heart:  regular rate and rhythm  Abdomen:  abdomen is soft without significant tenderness, masses, organomegaly or guarding  Extremities:  extremities normal, atraumatic, no cyanosis trace edema    Data Review:     Labs: Results:       Chemistry Recent Labs      02/08/18   0230  02/07/18   0520  02/05/18   1748   GLU  96  102*  98   NA  139  143  145   K  3.5  3.8  4.0   CL  105  108  110*   CO2  29  28  30   BUN  18  17  13   CREA  0.93  0.77  0.87   CA  9.7  9.9  9.9   MG  2.2   --    --    PHOS  3.2   --    --    AGAP  5  7  5   BUCR  19  22*  15   AP   --    --   96   TP   --    --   8.0   ALB   --    --   3.9   GLOB   --    --   4.1*   AGRAT   --    --   1.0      CBC w/Diff Recent Labs      02/08/18 0230 02/07/18   0520  02/05/18   1748   WBC  8.0  8.0  7.3   RBC  4.59*  4.97  4.99   HGB  12.5*  13.9  14.3   HCT  38.9  41.7  42.2   PLT  106*  115*  117*   GRANS  52  64  66   LYMPH  37  25  25   EOS  2  1  1      Cardiac Enzymes No results found for: CPK, CK, CKMMB, CKMB, RCK3, CKMBT, CKNDX, CKND1, JANNA, TROPT, TROIQ, GLENROY, TROPT, TNIPOC, BNP, BNPP   Coagulation No results for input(s): PTP, INR, APTT in the last 72 hours.     No lab exists for component: INREXT    Lipid Panel Lab Results   Component Value Date/Time    Cholesterol, total 128 05/06/2015 07:52 AM    HDL Cholesterol 35 (L) 05/06/2015 07:52 AM    LDL, calculated 73.2 05/06/2015 07:52 AM    VLDL, calculated 19.8 05/06/2015 07:52 AM    Triglyceride 99 05/06/2015 07:52 AM    CHOL/HDL Ratio 3.7 05/06/2015 07:52 AM      BNP No results found for: BNP, BNPP, XBNPT   Liver Enzymes Recent Labs      02/05/18   1748   TP  8.0   ALB  3.9   AP  96   SGOT  37      Digoxin    Thyroid Studies No results found for: T4, T3U, TSH, TSHEXT       Signed By: URMILA Lopez     February 8, 2018

## 2018-02-08 NOTE — DISCHARGE SUMMARY
Discharge Summary    Patient: Ro Nguyen MRN: [id number]  CSN: [id number]    YOB: 1960  Age: 61 y.o. Sex: male    DOA: 2/5/2018 LOS:  LOS: 3 days   Discharge Date:      Admission Diagnoses: CHF exacerbation ([facility]  CHF (congestive heart failure) ([facility]    Discharge Diagnoses:    Problem List as of 2/8/2018  Date Reviewed: 1/3/2017          Codes Class Noted - Resolved    CHF (congestive heart failure) ([facility] ICD-10-CM: I50.9  ICD-9-CM: 428.0  11/19/2016 - Present        Shortness of breath ICD-10-CM: R06.02  ICD-9-CM: 786.05  11/18/2016 - Present        Diastolic HF (heart failure) ([facility] ICD-10-CM: I50.30  ICD-9-CM: 428.30  Unknown - Present    Overview Signed 9/16/2015  9:53 AM by Stephany Lennox Maniu V     LV EF 50% (echo may 2015)             Hypertension ICD-10-CM: I10  ICD-9-CM: 401.9  Unknown - Present        Tobacco use ICD-10-CM: Z72.0  ICD-9-CM: 305.1  Unknown - Present        Atrial fibrillation ([facility] ICD-10-CM: I48.91  ICD-9-CM: 427.31  5/6/2015 - Present    Overview Addendum 3/20/2016  9:31 PM by Stephany Lennox Maniu V     LV EF 50% (may 2015); average HR 80 (Holter Feb 2016)             RESOLVED: CHF exacerbation ([facility] ICD-10-CM: I50.9  ICD-9-CM: 428.0  2/5/2018 - 2/8/2018            Reason for Admission  61 y.o.  male who presented with progressively worsening sob over the past few weeks. Patient stated his sob became intolerable that day. When he presented to the ED, he was noted to be in atrial fibrillation, but this was not new (was on last ekg). However, he was not on any antiplatelet or AC. Additionally, he clinically appeared significantly fluid overloaded. Had obvious orthopnea. Was given lasix in the ED and diuresed quickly, but still fairly symptomatic.      Discharge Condition: Good    PHYSICAL EXAM at discharge  Visit Vitals    /82 (BP 1 Location: Right arm, BP Patient Position: At rest)    Pulse 60    Temp 97.7 °F (36.5 °C)    Resp 20    Ht 5' 11\" (1.803 m)    Wt 119.5 kg (263 lb 8 oz)    SpO2 99%    BMI 36.75 kg/m2     General: awake alert and oriented x 4. Sitting up in chair. Ncat. perrl  Cardiovascular:  iRRR. Pulmonary: cta b.l  GI:  Soft, NT/ND, NABS. Extremities:  No edema. dp 2+ b.l  Neuro no focal deficit. Ambulating in room unassisted and w/o difficulty. SkiN: no rash    Hospital Course:   1. Acute on chronic diastolic CHF exac - improved with IV Lasix, pressure control. On ASA, BBlocker, lasix, and ACEi as well. Echo noted. Cardiology followed in consult. CHF education given to pt by Unit Manager. Packet received. 2. Hypertensive urgency improving. 3. Chronic AFib - ASA. No Coumadin due to compliance issues, no Xarelto due to cost issues. Started on asa. 4. CADz hx - noted. Continue med tx as above. 5. Hx noncompliance - counseled by multiple providers and staff. 6. Thrombocytopenia - monitored closely on heparin. No bleeding noted. 7. Tobacco abuse - smoking cessation education  8. Obesity Body mass index is 36.58 kg/(m^2). 9. dvt prophylaxis was given in the form of heparin subcut tid. 10. Full code. Discharge to home with hh. Patient agrees; all questions answered to the best of my ability. Consults: cardiology Dr. Bridget Paniagua    Significant Diagnostic Studies:      Ref.  Range 2/8/2018 02:30   WBC Latest Ref Range: 4.6 - 13.2 K/uL 8.0   RBC Latest Ref Range: 4.70 - 5.50 M/uL 4.59 (L)   HGB Latest Ref Range: 13.0 - 16.0 g/dL 12.5 (L)   HCT Latest Ref Range: 36.0 - 48.0 % 38.9   MCV Latest Ref Range: 74.0 - 97.0 FL 84.7   MCH Latest Ref Range: 24.0 - 34.0 PG 27.2   MCHC Latest Ref Range: 31.0 - 37.0 g/dL 32.1   RDW Latest Ref Range: 11.6 - 14.5 % 15.9 (H)   PLATELET Latest Ref Range: 135 - 420 K/uL 106 (L)   NEUTROPHILS Latest Ref Range: 40 - 73 % 52   LYMPHOCYTES Latest Ref Range: 21 - 52 % 37   MONOCYTES Latest Ref Range: 3 - 10 % 9   EOSINOPHILS Latest Ref Range: 0 - 5 % 2   BASOPHILS Latest Ref Range: 0 - 2 % 0   DF Latest Units: AUTOMATED   ABS. NEUTROPHILS Latest Ref Range: 1.8 - 8.0 K/UL 4.2   ABS. LYMPHOCYTES Latest Ref Range: 0.9 - 3.6 K/UL 3.0   ABS. MONOCYTES Latest Ref Range: 0.05 - 1.2 K/UL 0.7   ABS. EOSINOPHILS Latest Ref Range: 0.0 - 0.4 K/UL 0.2   ABS. BASOPHILS Latest Ref Range: 0.0 - 0.1 K/UL 0.0   Sodium Latest Ref Range: 136 - 145 mmol/L 139   Potassium Latest Ref Range: 3.5 - 5.5 mmol/L 3.5   Chloride Latest Ref Range: 100 - 108 mmol/L 105   CO2 Latest Ref Range: 21 - 32 mmol/L 29   Anion gap Latest Ref Range: 3.0 - 18 mmol/L 5   Glucose Latest Ref Range: 74 - 99 mg/dL 96   BUN Latest Ref Range: 7.0 - 18 MG/DL 18   Creatinine Latest Ref Range: 0.6 - 1.3 MG/DL 0.93   BUN/Creatinine ratio Latest Ref Range: 12 - 20   19   Calcium Latest Ref Range: 8.5 - 10.1 MG/DL 9.7   Phosphorus Latest Ref Range: 2.5 - 4.9 MG/DL 3.2   Magnesium Latest Ref Range: 1.6 - 2.6 mg/dL 2.2   GFR est non-AA Latest Ref Range: >60 ml/min/1.73m2 >60   GFR est AA Latest Ref Range: >60 ml/min/1.73m2 >60     IMAGING  EKG Results     Procedure 720 Value Units Date/Time    SCANNED CARDIAC RHYTHM STRIP [[id number]] Collected:  02/09/18 1217    Order Status:  Completed Updated:  02/09/18 1235    EKG, 12 LEAD, INITIAL [[id number]] Collected:  02/05/18 1909    Order Status:  Completed Updated:  02/06/18 1130     Ventricular Rate 86 BPM      Atrial Rate 90 BPM      QRS Duration 116 ms      Q-T Interval 390 ms      QTC Calculation (Bezet) 466 ms      Calculated R Axis -8 degrees      Calculated T Axis 60 degrees      Diagnosis --     Atrial fibrillation  Left ventricular hypertrophy with QRS widening  Abnormal ECG  When compared with ECG of 18-NOV-2016 19:39,  No significant change was found  Confirmed by Rere Romo MD, Hector Laureano ([id number]) on 2/6/2018 11:30:36 AM          XR Results (most recent):    Results from Hospital Encounter encounter on 02/05/18   XR CHEST SNGL V   Narrative Procedure:  Chest Portable. Indication:  Post arrest, hypothermia.     Comparison: 11/18/16. Findings:  Pulmonary vessels appear mildly engorged. No definite peripheral  septal line thickening. Cardiac silhouette is prominently enlarged. No  pneumothorax. No definite airspace opacities. Impression Impression:    1. Enlarged cardiac silhouette with vascular engorgement. 2.  No airspace disease. Transthoracic Echocardiogram 06-Feb-2018  Left ventricle: The ventricle was mildly dilated. Systolic function was mildly reduced by visual assessment. Ejection fraction was estimated in the range of 45 % to 50 %. No obvious wall motion abnormalities identified in the views obtained. Wall thickness was moderately to markedly increased. Concentric hypertrophy was present. Right ventricle: The size was at the upper limits of normal. Systolic pressure was mildly increased. Estimated peak pressure was 45 mmHg. Left atrium: The atrium was severely dilated. Right atrium: The atrium was dilated. Mitral valve: There was moderate regurgitation. The regurgitant jet was eccentric. Aortic valve: There was mild to moderate regurgitation. COMPARISONS: There has been no significant change. Comparison was made with the previous study of 20-Nov-2016. Discharge Medications:     Current Discharge Medication List      START taking these medications    Details   aspirin 81 mg chewable tablet Take 1 Tab by mouth daily. Qty: 30 Tab, Refills: 11      potassium chloride (K-DUR, KLOR-CON) 20 mEq tablet Take 1 Tab by mouth two (2) times daily (with meals). Qty: 60 Tab, Refills: 2         CONTINUE these medications which have CHANGED    Details   lisinopril (PRINIVIL, ZESTRIL) 20 mg tablet Take 1 Tab by mouth daily. Qty: 30 Tab, Refills: 2      furosemide (LASIX) 40 mg tablet Take 1 Tab by mouth Before breakfast and dinner. Or as directed  Qty: 60 Tab, Refills: 2         CONTINUE these medications which have NOT CHANGED    Details   carvedilol (COREG) 25 mg tablet Take 1 Tab by mouth every twelve (12) hours. Indications: Chronic Heart Failure  Qty: 60 Tab, Refills: 11      methocarbamol (ROBAXIN) 500 mg tablet Take 1 Tab by mouth four (4) times daily. Qty: 30 Tab, Refills: 0      nitroglycerin (NITROSTAT) 0.4 mg SL tablet 1 Tab by SubLINGual route every five (5) minutes as needed for Chest Pain. Qty: 1 Bottle, Refills: 1             Activity: as tolerated    Diet: cardiac, 1.5 g Na daily    Wound Care: As directed    Follow-up:   1. Dr. Torrie Garcia 7 - 10 days. 2. Dr. Bridget Paniagua 2 weeks.     Minutes spent on discharge: greater than 30

## 2018-02-09 ENCOUNTER — TELEPHONE (OUTPATIENT)
Dept: CARDIOLOGY CLINIC | Age: 58
End: 2018-02-09

## 2018-02-09 ENCOUNTER — TELEPHONE (OUTPATIENT)
Dept: CARDIAC REHAB | Age: 58
End: 2018-02-09

## 2018-02-09 NOTE — PROGRESS NOTES
0700--Bedside shift change report given to MIREILLE Vickers (oncoming nurse) by Nallely Watkins (offgoing nurse). Report included the following information SBAR, Kardex, MAR, Recent Results and Cardiac Rhythm AFib.     1520--Pt d/c to home with family via [facility]. Prescriptions reviewed at length with pt. Pt stated he would take them to [facility], declined them to be filled in-house. IVs removed. CHF education given to pt by Unit Manager. Packet received.

## 2018-02-09 NOTE — PROGRESS NOTES
Nutrition: Consulted to provide diet education on a low sodium diet on day of discharge. Unable to reach patient prior to discharge. Call placed to patient and voicemail left requesting a callback today. Will follow up again to attempt discussing mailing educational handouts to patient and providing verbal education as needed.      Jo Hoffman RD

## 2018-02-09 NOTE — TELEPHONE ENCOUNTER
Cardiac Rehab screening complete. According to Medicare guidelines, patients diagnosed with CHF must have an EF of 35% or less to qualify for the program. Since patient's EF this admission was 45-50%, he is not a candidate for the program based on Medicare's requirements for medical necessity.      Thank you,  Yeimi Guillen

## 2018-02-09 NOTE — TELEPHONE ENCOUNTER
Attempted to contact patient to change his [facility] appointment that is scheduled for 2/22/18 with Naya Blount. Dr. Kellee Osorio states that patient asked to follow up with him in the future instead of Dr. Derek Philippe. His [facility] appointment can be rescheduled to 3/1/18 with Dr. Kellee Osorio (double book). The recording on his phone states that he is unable to accept calls at this time . Torri Grider I will try to reach again.

## 2018-02-12 NOTE — PROGRESS NOTES
Nutrition: Attempted to call patient again, but voicemail unable to accept messages this time. Low sodium diet education handouts mailed to patients house today. Contact information provided with handouts.       Yazmin Tafoya RD

## 2018-02-13 RX ORDER — POTASSIUM CHLORIDE 20 MEQ/1
20 TABLET, EXTENDED RELEASE ORAL 2 TIMES DAILY WITH MEALS
Qty: 60 TAB | Refills: 6 | Status: SHIPPED | OUTPATIENT
Start: 2018-02-13 | End: 2018-03-20

## 2018-02-13 RX ORDER — FUROSEMIDE 40 MG/1
40 TABLET ORAL
Qty: 60 TAB | Refills: 6 | Status: ON HOLD | OUTPATIENT
Start: 2018-02-13 | End: 2018-03-20

## 2018-02-16 ENCOUNTER — HOME CARE VISIT (OUTPATIENT)
Dept: SCHEDULING | Facility: HOME HEALTH | Age: 58
End: 2018-02-16

## 2018-02-16 ENCOUNTER — HOME CARE VISIT (OUTPATIENT)
Dept: HOME HEALTH SERVICES | Facility: HOME HEALTH | Age: 58
End: 2018-02-16

## 2018-03-01 ENCOUNTER — OFFICE VISIT (OUTPATIENT)
Dept: CARDIOLOGY CLINIC | Age: 58
End: 2018-03-01

## 2018-03-01 VITALS
WEIGHT: 261 LBS | BODY MASS INDEX: 36.54 KG/M2 | DIASTOLIC BLOOD PRESSURE: 80 MMHG | HEART RATE: 70 BPM | HEIGHT: 71 IN | SYSTOLIC BLOOD PRESSURE: 110 MMHG

## 2018-03-01 DIAGNOSIS — R06.02 SOB (SHORTNESS OF BREATH): ICD-10-CM

## 2018-03-01 DIAGNOSIS — R07.9 CHEST PAIN, UNSPECIFIED TYPE: ICD-10-CM

## 2018-03-01 DIAGNOSIS — Z72.0 TOBACCO USE: ICD-10-CM

## 2018-03-01 DIAGNOSIS — I10 ESSENTIAL HYPERTENSION: ICD-10-CM

## 2018-03-01 DIAGNOSIS — I50.42 CHRONIC COMBINED SYSTOLIC AND DIASTOLIC CONGESTIVE HEART FAILURE (HCC): Primary | ICD-10-CM

## 2018-03-01 DIAGNOSIS — I48.19 PERSISTENT ATRIAL FIBRILLATION (HCC): ICD-10-CM

## 2018-03-01 NOTE — LETTER
3/1/2018 10:35 AM 
 
 
 
Mary Jane Polanco 
[id number] 
1960 Insurance:  Medicare                  Auth # No Auth Needed Proc Date: Tues. 3/20                Proc Time:  9:15am 
 
Performing MD : Dr. Trinity Garcia                      Procedure:R&L [facility] Hospital:  [facility]                                            PCP Dr. Martine Paez Scheduled with:  Jessica/Email                                                        Date:3/1/2018 HP: 3/1      EKG: 3/1    Labs:______  CXR: _______  Orders:  3/13 Special Instructions:  _____________________________________________________ 
______________________________________________________________________ 
______________________________________________________________________ Date Faxed:   ______________   Pages Faxed: ___________________ The materials enclosed with this facsimile transmission are private and confidential and are the property of the sender. If you are not the intended recipient, be advised that any unauthorized use, disclosure, copying, distribution, or the taking of any action in reliance on the contents of this telecopied information is strictly prohibited. If you have received this in error, please immediately notify the sender via telephone to arrange for return of the forwarded documentation.

## 2018-03-01 NOTE — PROGRESS NOTES
1. Have you been to the ER, urgent care clinic since your last visit? Hospitalized since your last visit? 2/5/18 - 2/8/18 CHF  2. Have you seen or consulted any other health care providers outside of the [address] [place] [address] since your last visit? Include any pap smears or colon screening.  no

## 2018-03-01 NOTE — PROGRESS NOTES
HISTORY OF PRESENT ILLNESS  Caridad Grimaldo is a 61 y.o. male. HPI    Patient presents for a follow-up post hospital office visit. He is previously seen by Dr. Rahul Galvan. He has a past medical history significant for medication noncompliance. He has chronic atrial fibrillation, a hypertensive cardiomyopathy due to poorly controlled high blood pressure and ongoing tobacco use. Patient was recently hospitalized for several days at the beginning of February 2018 for hypertensive urgency and congestive heart failure. He was diuresed and started back on his blood pressure medications and his symptoms improved. He underwent an echocardiogram in February 2018 which showed a mildly depressed LV systolic function, EF 16-75% with moderate to severe LVH and severe left atrial enlargement, moderate mitral regurgitation and moderate pulmonary hypertension. RVSP 45 mmHg. Since hospital discharge, the patient states he has been very compliant with his medications. His blood pressure has been well controlled, however he continues to have shortness of breath with nearly any activity. He denies any orthopnea, PND or leg swelling. He also complains of intermittent left-sided chest pain under his left breast which is nonexertional in nature and more related to his breathing. This will improve when he drinks some water. He denies any prolonged palpitations, severe dizzy spells or christian syncope. Past Medical History:   Diagnosis Date    Atrial fibrillation ([address] 05/06/2015    Persistent    Diastolic HF (heart failure) (HCC)     EF 45-50% on echocardiogram 2/2018    History of echocardiogram 02/06/2018    LVE. EF 45-50%. No WMA. Mod-severe conc LVH. Indeterminate diastolic fx. RVSP at least 45 mmHg. Severe LAE. Mod ROSEMARIE. Mod MR.    Mod TR. mild to moderate AI    Hypertension     Tobacco use      Current Outpatient Prescriptions   Medication Sig Dispense Refill    potassium chloride (K-DUR, KLOR-CON) 20 mEq tablet Take 1 Tab by mouth two (2) times daily (with meals). 60 Tab 6    furosemide (LASIX) 40 mg tablet Take 1 Tab by mouth Before breakfast and dinner. Or as directed 60 Tab 6    lisinopril (PRINIVIL, ZESTRIL) 20 mg tablet Take 1 Tab by mouth daily. 30 Tab 2    aspirin 81 mg chewable tablet Take 1 Tab by mouth daily. 30 Tab 11    carvedilol (COREG) 25 mg tablet Take 1 Tab by mouth every twelve (12) hours. Indications: Chronic Heart Failure 60 Tab 2    nitroglycerin (NITROSTAT) 0.4 mg SL tablet 1 Tab by SubLINGual route every five (5) minutes as needed for Chest Pain. 1 Bottle 1    methocarbamol (ROBAXIN) 500 mg tablet Take 1 Tab by mouth four (4) times daily. 30 Tab 0     No Known Allergies     Social History   Substance Use Topics    Smoking status: Current Every Day Smoker     Packs/day: 0.50     Years: 40.00    Smokeless tobacco: Never Used      Comment: trying to quit    Alcohol use No     Family History   Problem Relation Age of Onset    Heart Failure Mother     Obesity Mother     Diabetes Father          Review of Systems   Constitutional: Negative for chills, fever and weight loss. HENT: Negative for nosebleeds. Eyes: Negative for blurred vision and double vision. Respiratory: Positive for shortness of breath. Negative for cough and wheezing. Cardiovascular: Positive for chest pain. Negative for palpitations, orthopnea, claudication, leg swelling and PND. Gastrointestinal: Negative for abdominal pain, heartburn, nausea and vomiting. Genitourinary: Negative for dysuria and hematuria. Musculoskeletal: Negative for falls and myalgias. Skin: Negative for rash. Neurological: Negative for dizziness, focal weakness and headaches. Endo/Heme/Allergies: Does not bruise/bleed easily. Psychiatric/Behavioral: Negative for substance abuse.      Visit Vitals    /80    Pulse 70    Ht 5' 11\" (1.803 m)    Wt 118.4 kg (261 lb)    BMI 36.4 kg/m2       Physical Exam Constitutional: He is oriented to person, place, and time. He appears well-developed and well-nourished. HENT:   Head: Normocephalic and atraumatic. Eyes: Conjunctivae are normal.   Neck: Neck supple. No JVD present. Carotid bruit is not present. Cardiovascular: Normal rate, S1 normal, S2 normal and normal pulses. An irregularly irregular rhythm present. Exam reveals no gallop and no S3.    Murmur heard. High-pitched blowing holosystolic murmur is present with a grade of 2/6  at the apex  Pulmonary/Chest: Breath sounds normal. He has no wheezes. He has no rales. Abdominal: Soft. Bowel sounds are normal. There is no tenderness. Musculoskeletal: He exhibits no edema. Neurological: He is alert and oriented to person, place, and time. Skin: Skin is warm and dry. EKG: Atrial fibrillation, voltage criteria for LVH, controlled ventricular rate, leftward axis, no ST or T-wave abnormalities concerning for ischemia. No change compared to the previous EKG. ASSESSMENT and PLAN  Encounter Diagnoses   Name Primary?  Chronic combined systolic and diastolic congestive heart failure (HCC) Yes    SOB (shortness of breath)     Persistent atrial fibrillation (HCC)     Tobacco use     Chest pain, unspecified type     Essential hypertension      Chronic systolic and diastolic heart failure. Patient's volume status appears stable on his current diuretic regimen. His blood pressure is now well controlled on his current dosage of carvedilol and lisinopril. He continues to have what sounds like NYHA class III symptomatology. I have recommended a right and left heart catheterization to further evaluate his coronary anatomy and his LV and right sided to see if any medications need to be adjusted. Chest pain. This is somewhat atypical for angina, however given his risk factors, I would like to definitively evaluate his coronary anatomy with a cardiac catheterization.   He last had a cardiac catheterization 10 years ago which did not show any obstructive disease. Persistent atrial fibrillation. This has been present for at least 3 years if not longer. He was initially started on Xarelto for anticoagulation, however he could not afford this and then was switched to warfarin, however this was eventually stopped because he was not compliant with his INR checks. At this point I would continue to use aspirin for CVA prophylaxis. If his compliance level appears to improve, I would reconsider a trial of warfarin. Essential hypertension. This has been historically poorly controlled  due to medication noncompliance. Now that he is taking all his medications blood pressure looks excellent. I will continue his current regimen. Hypertensive cardiomyopathy. Patient recently underwent an echocardiogram in February 2018 which showed a mildly depressed LV systolic function, EF 91-54% with moderate to severe concentric LVH. Tobacco use disorder. Patient continues to smoke half a pack of cigarettes a day. He was encouraged to quit smoking completely. Follow-up in 6 weeks, sooner if needed.

## 2018-03-13 ENCOUNTER — HOSPITAL ENCOUNTER (OUTPATIENT)
Dept: LAB | Age: 58
Discharge: HOME | End: 2018-03-13
Payer: MEDICARE

## 2018-03-13 ENCOUNTER — OFFICE VISIT (OUTPATIENT)
Dept: CARDIOLOGY CLINIC | Age: 58
End: 2018-03-13

## 2018-03-13 DIAGNOSIS — I50.42 CHRONIC COMBINED SYSTOLIC AND DIASTOLIC CONGESTIVE HEART FAILURE (HCC): ICD-10-CM

## 2018-03-13 DIAGNOSIS — I50.42 CHRONIC COMBINED SYSTOLIC AND DIASTOLIC CONGESTIVE HEART FAILURE (HCC): Primary | ICD-10-CM

## 2018-03-13 LAB
ALBUMIN SERPL-MCNC: 3.7 G/DL (ref 3.4–5)
ALBUMIN/GLOB SERPL: 1 {RATIO} (ref 0.8–1.7)
ALP SERPL-CCNC: 91 U/L (ref 45–117)
ALT SERPL-CCNC: 24 U/L (ref 16–61)
ANION GAP SERPL CALC-SCNC: 7 MMOL/L (ref 3–18)
AST SERPL-CCNC: 9 U/L (ref 15–37)
BASOPHILS # BLD: 0 K/UL (ref 0–0.06)
BASOPHILS NFR BLD: 0 % (ref 0–2)
BILIRUB SERPL-MCNC: 0.4 MG/DL (ref 0.2–1)
BUN SERPL-MCNC: 29 MG/DL (ref 7–18)
BUN/CREAT SERPL: 18 (ref 12–20)
CALCIUM SERPL-MCNC: 10.7 MG/DL (ref 8.5–10.1)
CHLORIDE SERPL-SCNC: 109 MMOL/L (ref 100–108)
CO2 SERPL-SCNC: 23 MMOL/L (ref 21–32)
CREAT SERPL-MCNC: 1.61 MG/DL (ref 0.6–1.3)
DIFFERENTIAL METHOD BLD: ABNORMAL
EOSINOPHIL # BLD: 0.3 K/UL (ref 0–0.4)
EOSINOPHIL NFR BLD: 5 % (ref 0–5)
ERYTHROCYTE [DISTWIDTH] IN BLOOD BY AUTOMATED COUNT: 15 % (ref 11.6–14.5)
GLOBULIN SER CALC-MCNC: 3.7 G/DL (ref 2–4)
GLUCOSE SERPL-MCNC: 108 MG/DL (ref 74–99)
HCT VFR BLD AUTO: 41.9 % (ref 36–48)
HGB BLD-MCNC: 14.4 G/DL (ref 13–16)
INR PPP: 1 (ref 0.8–1.2)
LYMPHOCYTES # BLD: 2 K/UL (ref 0.9–3.6)
LYMPHOCYTES NFR BLD: 33 % (ref 21–52)
MCH RBC QN AUTO: 28.2 PG (ref 24–34)
MCHC RBC AUTO-ENTMCNC: 34.4 G/DL (ref 31–37)
MCV RBC AUTO: 82 FL (ref 74–97)
MONOCYTES # BLD: 0.6 K/UL (ref 0.05–1.2)
MONOCYTES NFR BLD: 10 % (ref 3–10)
NEUTS SEG # BLD: 3.2 K/UL (ref 1.8–8)
NEUTS SEG NFR BLD: 52 % (ref 40–73)
PLATELET # BLD AUTO: 126 K/UL (ref 135–420)
PLATELET COMMENTS,PCOM: ABNORMAL
POTASSIUM SERPL-SCNC: 5.3 MMOL/L (ref 3.5–5.5)
PROT SERPL-MCNC: 7.4 G/DL (ref 6.4–8.2)
PROTHROMBIN TIME: 13 SEC (ref 11.5–15.2)
RBC # BLD AUTO: 5.11 M/UL (ref 4.7–5.5)
RBC MORPH BLD: ABNORMAL
SODIUM SERPL-SCNC: 139 MMOL/L (ref 136–145)
WBC # BLD AUTO: 6.1 K/UL (ref 4.6–13.2)
WBC MORPH BLD: ABNORMAL

## 2018-03-13 PROCEDURE — 36415 COLL VENOUS BLD VENIPUNCTURE: CPT | Performed by: INTERNAL MEDICINE

## 2018-03-13 PROCEDURE — 85025 COMPLETE CBC W/AUTO DIFF WBC: CPT | Performed by: INTERNAL MEDICINE

## 2018-03-13 PROCEDURE — 80053 COMPREHEN METABOLIC PANEL: CPT | Performed by: INTERNAL MEDICINE

## 2018-03-13 PROCEDURE — 85610 PROTHROMBIN TIME: CPT | Performed by: INTERNAL MEDICINE

## 2018-03-13 NOTE — PATIENT INSTRUCTIONS
Instructions    Patients Name:  Yasemin French    1. You are scheduled to have a Left and Right Heart Cath on March 20, 2018  at 0915 am.   Please check in at 0800 am  .     2. Please go to [facility] and park in the outpatient parking lot that is located around to the back of the hospital and enter through the [employer]. [facility] the  there. The  will either give you directions or assist you in getting to the cath holding area. 3. You are not to eat or drink anything after midnight the night before your procedure. Small sips of water to take your medications is ok. 4. If you are diabetic, do not take your insulin/sugar pill the morning of the procedure. 5. MEDICATION INSTRUCTIONS:   Please take your morning medications with the following special instructions:    [x]          Please make sure to take your Blood pressure medication : Hold Lasix the morning of your procedure. [x]          Take your Aspirin. 6. We encourage families to wait in the waiting room on the first floor while the procedure is being done. The Doctor will come out and talk with you as soon as the procedure is over. 7. There is the possibility that you may spend the night in the hospital, depending on the results of the procedure. This will be determined after the procedure is done. If angioplasty or stent is planned, you will stay at least one day. 8. If you or your family have any questions, please call our office Monday Friday, 9:00 a. m.4:30 p.m.,  At [phone], and ask to speak to one of the nurses.

## 2018-03-13 NOTE — MR AVS SNAPSHOT
[address] [place] [address] [address] [facility] [address] 
[phone] Patient: Fuentes Calvillo MRN: [id number] OKO:[birth year] Visit Information Date & Time Provider Department Dept. Phone Encounter #  
 3/13/2018 10:00 AM [facility] [phone] [id number] Your Appointments 3/13/2018 10:00 AM  
PRE PROCEDURE with [facility] ([address]) Appt Note: R&L Cath on 3/20  
 [address] [facility] [address] [facility] [id number] [phone] [address] [place] [address] [address] Upcoming Health Maintenance Date Due Hepatitis C Screening 1960 DTaP/Tdap/Td series (1 - Tdap) 6/4/1980 FOBT Q 1 YEAR AGE 50-75 6/4/2009 Allergies as of 3/13/2018  Review Complete On: 3/1/2018 By: Saeed Cole MD  
 No Known Allergies Current Immunizations  Never Reviewed Name Date Influenza Vaccine (Quad) PF 2/7/2018 11:01 AM  
 Pneumococcal Polysaccharide (PPSV-23) 5/9/2015  1:45 PM  
  
 Not reviewed this visit You Were Diagnosed With   
  
 Codes Comments Chronic combined systolic and diastolic congestive heart failure (HCC)    -  Primary ICD-10-CM: I50.42 
ICD-9-CM: 428.42, 428.0 Vitals Smoking Status Current Every Day Smoker Preferred Pharmacy Pharmacy Name Phone [facility] [facility] [phone] Your Updated Medication List  
  
   
This list is accurate as of 3/13/18  8:01 AM.  Always use your most recent med list.  
  
  
  
  
 aspirin 81 mg chewable tablet Take 1 Tab by mouth daily. carvedilol 25 mg tablet Commonly known as:  Lolis Locus Take 1 Tab by mouth every twelve (12) hours. Indications: Chronic Heart Failure  
  
 furosemide 40 mg tablet Commonly known as:  LASIX Take 1 Tab by mouth Before breakfast and dinner. Or as directed  
  
 lisinopril 20 mg tablet Commonly known as:  Leeland Nutley Take 1 Tab by mouth daily. methocarbamol 500 mg tablet Commonly known as:  ROBAXIN Take 1 Tab by mouth four (4) times daily. nitroglycerin 0.4 mg SL tablet Commonly known as:  NITROSTAT  
1 Tab by SubLINGual route every five (5) minutes as needed for Chest Pain.  
  
 potassium chloride 20 mEq tablet Commonly known as:  K-DUR, KLOR-CON Take 1 Tab by mouth two (2) times daily (with meals). To-Do List   
 03/13/2018 Lab:  CBC WITH AUTOMATED DIFF   
  
 03/13/2018 Lab:  METABOLIC PANEL, COMPREHENSIVE   
  
 03/13/2018 Lab:  PROTHROMBIN TIME + INR   
  
 03/20/2018 9:15 AM  
  Appointment with [facility] CAD NO ANESTHESIA; [facility] 1 CATH LAB; [facility] CATH HOLDING at [address] [place] [address] ([phone]) Patient Instructions Instructions Patients Name:  Topher Rosa 1. You are scheduled to have a Left and Right Heart Cath on March 20, 2018  at 0915 am.   Please check in at 0800 am  . 2. Please go to [facility] and park in the outpatient parking lot that is located around to the back of the hospital and enter through the [facility] building. Once you enter through the [facility] check in with the  there. The  will either give you directions or assist you in getting to the cath holding area. 3. You are not to eat or drink anything after midnight the night before your procedure. Small sips of water to take your medications is ok. 4. If you are diabetic, do not take your insulin/sugar pill the morning of the procedure. 5. MEDICATION INSTRUCTIONS:   Please take your morning medications with the following special instructions: 
 
[x]          Please make sure to take your Blood pressure medication : Hold Lasix the morning of your procedure. [x]          Take your Aspirin. 6. We encourage families to wait in the waiting room on the first floor while the procedure is being done. The Doctor will come out and talk with you as soon as the procedure is over. 7. There is the possibility that you may spend the night in the hospital, depending on the results of the procedure. This will be determined after the procedure is done. If angioplasty or stent is planned, you will stay at least one day. 8. If you or your family have any questions, please call our office Monday Friday, 9:00 a. m.4:30 p.m.,  At [phone], and ask to speak to one of the nurses. Introducing [facility] & HEALTH SERVICES! [facility] introduces DashLuxe patient portal. Now you can access parts of your medical record, email your doctor's office, and request medication refills online. 1. In your internet browser, go to [email or web address] 2. Click on the First Time User? Click Here link in the Sign In box. You will see the New Member Sign Up page. 3. Enter your DashLuxe Access Code exactly as it appears below. You will not need to use this code after youve completed the sign-up process. If you do not sign up before the expiration date, you must request a new code. · DashLuxe Access Code: [email or web address] Expires: 5/9/2018  9:34 AM 
 
4. Enter the last four digits of your Social Security Number (xxxx) and Date of Birth (mm/dd/yyyy) as indicated and click Submit. You will be taken to the next sign-up page. 5. Create a Kofaxt ID. This will be your Kofaxt login ID and cannot be changed, so think of one that is secure and easy to remember. 6. Create a Kofaxt password. You can change your password at any time. 7. Enter your Password Reset Question and Answer. This can be used at a later time if you forget your password. 8. Enter your e-mail address. You will receive e-mail notification when new information is available in [address] 9. Click Sign Up. You can now view and download portions of your medical record. 10. Click the Download Summary menu link to download a portable copy of your medical information. If you have questions, please visit the Frequently Asked Questions section of the Alice.[email or web address] website. Remember, Alice.com is NOT to be used for urgent needs. For medical emergencies, dial 911. Now available from your iPhone and Android! Please provide this summary of care documentation to your next provider. Your primary care clinician is listed as Cosmo Mcfadden. If you have any questions after today's visit, please call [phone].

## 2018-03-20 ENCOUNTER — HOSPITAL ENCOUNTER (OUTPATIENT)
Dept: CARDIAC CATH/INVASIVE PROCEDURES | Age: 58
Discharge: HOME | End: 2018-03-20
Attending: INTERNAL MEDICINE | Admitting: INTERNAL MEDICINE
Payer: MEDICARE

## 2018-03-20 VITALS
HEART RATE: 48 BPM | WEIGHT: 260 LBS | DIASTOLIC BLOOD PRESSURE: 55 MMHG | OXYGEN SATURATION: 98 % | RESPIRATION RATE: 10 BRPM | SYSTOLIC BLOOD PRESSURE: 93 MMHG | BODY MASS INDEX: 36.4 KG/M2 | TEMPERATURE: 97.7 F | HEIGHT: 71 IN

## 2018-03-20 PROCEDURE — C1894 INTRO/SHEATH, NON-LASER: HCPCS

## 2018-03-20 PROCEDURE — 74011250636 HC RX REV CODE- 250/636: Performed by: INTERNAL MEDICINE

## 2018-03-20 PROCEDURE — 74011000250 HC RX REV CODE- 250: Performed by: INTERNAL MEDICINE

## 2018-03-20 PROCEDURE — 74011636320 HC RX REV CODE- 636/320: Performed by: INTERNAL MEDICINE

## 2018-03-20 RX ORDER — HEPARIN SODIUM 1000 [USP'U]/ML
10000 INJECTION, SOLUTION INTRAVENOUS; SUBCUTANEOUS AS NEEDED
Status: DISCONTINUED | OUTPATIENT
Start: 2018-03-20 | End: 2018-03-20 | Stop reason: HOSPADM

## 2018-03-20 RX ORDER — HEPARIN SODIUM 200 [USP'U]/100ML
1000 INJECTION, SOLUTION INTRAVENOUS AS NEEDED
Status: DISCONTINUED | OUTPATIENT
Start: 2018-03-20 | End: 2018-03-20 | Stop reason: HOSPADM

## 2018-03-20 RX ORDER — MIDAZOLAM HYDROCHLORIDE 1 MG/ML
.5-2 INJECTION, SOLUTION INTRAMUSCULAR; INTRAVENOUS
Status: DISCONTINUED | OUTPATIENT
Start: 2018-03-20 | End: 2018-03-20 | Stop reason: HOSPADM

## 2018-03-20 RX ORDER — FUROSEMIDE 40 MG/1
20 TABLET ORAL
Qty: 60 TAB | Refills: 6 | Status: SHIPPED
Start: 2018-03-20 | End: 2018-04-16 | Stop reason: SDUPTHER

## 2018-03-20 RX ORDER — LIDOCAINE HYDROCHLORIDE 10 MG/ML
1-30 INJECTION, SOLUTION EPIDURAL; INFILTRATION; INTRACAUDAL; PERINEURAL ONCE
Status: COMPLETED | OUTPATIENT
Start: 2018-03-20 | End: 2018-03-20

## 2018-03-20 RX ORDER — FENTANYL CITRATE 50 UG/ML
12.5-1 INJECTION, SOLUTION INTRAMUSCULAR; INTRAVENOUS
Status: DISCONTINUED | OUTPATIENT
Start: 2018-03-20 | End: 2018-03-20 | Stop reason: HOSPADM

## 2018-03-20 RX ORDER — ACETAMINOPHEN 325 MG/1
650 TABLET ORAL
Status: DISCONTINUED | OUTPATIENT
Start: 2018-03-20 | End: 2018-03-20 | Stop reason: HOSPADM

## 2018-03-20 RX ORDER — LISINOPRIL 20 MG/1
10 TABLET ORAL DAILY
Qty: 30 TAB | Refills: 2 | Status: SHIPPED
Start: 2018-03-20 | End: 2018-04-16 | Stop reason: SDUPTHER

## 2018-03-20 RX ORDER — SODIUM CHLORIDE 9 MG/ML
100 INJECTION, SOLUTION INTRAVENOUS CONTINUOUS
Status: DISCONTINUED | OUTPATIENT
Start: 2018-03-20 | End: 2018-03-20 | Stop reason: HOSPADM

## 2018-03-20 RX ADMIN — FENTANYL CITRATE 25 MCG: 50 INJECTION INTRAMUSCULAR; INTRAVENOUS at 10:25

## 2018-03-20 RX ADMIN — IOPAMIDOL 100 ML: 612 INJECTION, SOLUTION INTRAVENOUS at 10:22

## 2018-03-20 RX ADMIN — FENTANYL CITRATE 25 MCG: 50 INJECTION INTRAMUSCULAR; INTRAVENOUS at 09:39

## 2018-03-20 RX ADMIN — SODIUM CHLORIDE 100 ML/HR: 900 INJECTION, SOLUTION INTRAVENOUS at 11:10

## 2018-03-20 RX ADMIN — LIDOCAINE HYDROCHLORIDE 20 ML: 10 INJECTION, SOLUTION EPIDURAL; INFILTRATION; INTRACAUDAL; PERINEURAL at 09:40

## 2018-03-20 NOTE — PROGRESS NOTES
Cath holding summary    0845:Patient escorted to cath holding from waiting area ambulatory, alert and oriented x 4, voicing no complaints. Changed into gown and placed on monitor. Carrie noted. NPO since MN. Lab results, med rec and H&P reviewed on chart. PIV x 2 inserted without difficulty. 11:00 SHEATH PULL NOTE:    Patient informed of procedure and questions answered with review. 7Fr in right groin pulled at 1040 by Leonid Crawley RN. Hand hold and good hemostasis, with manual compression to site. Handhold for 10 minutes. Gauze and transparent dressing applied to site. No bleeding, no hematoma, no pain/discomfort at site. Groin instructions provided with review. Patient verbalized understanding. 1350: Pt up to ambulate without difficulty. Remains asymptomatic with stable groin site after progressive ambulation. Dressed at bedside and PIV's removed in good condition. DC instructions reviewed with verbalized understanding and copy provided. Released ambulatory to daughter at vehicle in no pain.

## 2018-03-20 NOTE — IP AVS SNAPSHOT
[address] [id number] [address] Patient: Jaspreet Price MRN: [id number] KXD:4/7/0326 About your hospitalization You were admitted on:  March 20, 2018 You last received care in the:  [facility] CATH HOLDING You were discharged on:  March 20, 2018 Why you were hospitalized Your primary diagnosis was:  Not on File Follow-up Information Follow up With Details Comments Contact Info Crista Cisneros MD   [address] [facility] [address] [address] 
[phone] Sabino Regan MD Schedule an appointment as soon as possible for a visit in 1 month  [facility] 
[phone] Discharge Orders None A check carline indicates which time of day the medication should be taken. My Medications CHANGE how you take these medications Instructions Each Dose to Equal  
 Morning Noon Evening Bedtime  
 furosemide 40 mg tablet Commonly known as:  LASIX What changed:  how much to take Your last dose was: Your next dose is: Take 0.5 Tabs by mouth Before breakfast and dinner. Or as directed 20 mg  
    
   
   
   
  
 lisinopril 20 mg tablet Commonly known as:  Shanti Whitman What changed:  how much to take Your last dose was: Your next dose is: Take 0.5 Tabs by mouth daily. 10 mg CONTINUE taking these medications Instructions Each Dose to Equal  
 Morning Noon Evening Bedtime  
 aspirin 81 mg chewable tablet Your last dose was: Your next dose is: Take 1 Tab by mouth daily. 81 mg  
    
   
   
   
  
 carvedilol 25 mg tablet Commonly known as:  Charissa Acosta Your last dose was: Your next dose is: Take 1 Tab by mouth every twelve (12) hours. Indications: Chronic Heart Failure  25 mg  
    
   
 methocarbamol 500 mg tablet Commonly known as:  ROBAXIN Your last dose was: Your next dose is: Take 1 Tab by mouth four (4) times daily. 500 mg  
    
   
   
   
  
 nitroglycerin 0.4 mg SL tablet Commonly known as:  NITROSTAT Your last dose was: Your next dose is:    
   
   
 1 Tab by SubLINGual route every five (5) minutes as needed for Chest Pain. 0.4 mg  
    
   
   
   
  
  
STOP taking these medications   
 potassium chloride 20 mEq tablet Commonly known as:  K-DUR, KLOR-CON Where to Get Your Medications Information on where to get these meds will be given to you by the nurse or doctor. ! Ask your nurse or doctor about these medications  
  furosemide 40 mg tablet  
 lisinopril 20 mg tablet Discharge Instructions HEART CATHETERIZATION/ANGIOGRAPHY DISCHARGE INSTRUCTIONS 1. Check puncture site frequently for swelling or bleeding. If there is any bleeding, lie down and apply pressure over the area with a clean towel or washcloth. Notify your doctor for any redness, swelling, drainage, or oozing from the puncture site. Notify your doctor for any fever or chills. 2. If the extremity becomes cold, numb, or painful call Dr. Rodger Hoffman at [phone]. 
3. Activity should be limited for the next 48 hours. Climb stairs as little as possible and avoid any stooping, bending, or strenuous activity for 48 hours. No heavy lifting (anything over 10 pounds) for 3 days. 4. You may resume your usual diet. Drink more fluids than usual. 
5. Have a responsible person drive you home and stay with you for at least 24 hours after your heart catheterization/angiography. 6. You may remove bandage from your Right and Groin in 24 hours. You may shower in 24 hours. No tub baths, hot tubs, or swimming for 1 week.  Do not place any lotions, creams, powders, or ointments over puncture site for 1 week. You may place a clean band-aid over the puncture site each day for 5 days. Change daily. DISCHARGE SUMMARY from Nurse PATIENT INSTRUCTIONS: 
 
 
F-face looks uneven A-arms unable to move or move unevenly S-speech slurred or non-existent T-time-call 911 as soon as signs and symptoms begin-DO NOT go Back to bed or wait to see if you get better-TIME IS BRAIN. Warning Signs of HEART ATTACK Call 911 if you have these symptoms: 
? Chest discomfort. Most heart attacks involve discomfort in the center of the chest that lasts more than a few minutes, or that goes away and comes back. It can feel like uncomfortable pressure, squeezing, fullness, or pain. ? Discomfort in other areas of the upper body. Symptoms can include pain or discomfort in one or both arms, the back, neck, jaw, or stomach. ? Shortness of breath with or without chest discomfort. ? Other signs may include breaking out in a cold sweat, nausea, or lightheadedness. Don't wait more than five minutes to call [address]! Fast action can save your life. Calling 911 is almost always the fastest way to get lifesaving treatment. Emergency Medical Services staff can begin treatment when they arrive  up to an hour sooner than if someone gets to the hospital by car. The discharge information has been reviewed with the patient. The patient verbalized understanding. Discharge medications reviewed with the patient and appropriate educational materials and side effects teaching were provided. ACO Transitions of Care Introducing [employer] [facility] offers a voluntary care coordination program to provide high quality service and care to [facility] fee-for-service beneficiaries. Liza [place] was designed to help you enhance your health and well-being through the following services: ? Transitions of Care  support for individuals who are transitioning from one care setting to another (example: Hospital to home). ? Chronic and Complex Care Coordination  support for individuals and caregivers of those with serious or chronic illnesses or with more than one chronic (ongoing) condition and those who take a number of different medications. If you meet specific medical criteria, a [facility] may call you directly to coordinate your care with your primary care physician and your other care providers. For questions about the [facility] programs, please, contact your physicians office. For general questions or additional information about Accountable Care Organizations: 
Please visit www.medicare.gov/acos. html or call 1-800-MEDICARE (8-663.610.9868) TTY users should call 1-704.249.4448. Introducing [facility] & HEALTH SERVICES! [facility] introduces [employer] patient portal. Now you can access parts of your medical record, email your doctor's office, and request medication refills online. 1. In your internet browser, go to [email or web address] 2. Click on the First Time User? Click Here link in the Sign In box. You will see the New Member Sign Up page. 3. Enter your T[employer] Access Code exactly as it appears below. You will not need to use this code after youve completed the sign-up process. If you do not sign up before the expiration date, you must request a new code. · [employer] Access Code: [email or web address] Expires: 5/9/2018  9:34 AM 
 
4. Enter the last four digits of your Social Security Number (xxxx) and Date of Birth (mm/dd/yyyy) as indicated and click Submit. You will be taken to the next sign-up page. 5. Create a Kiggitt ID. This will be your [employer] login ID and cannot be changed, so think of one that is secure and easy to remember. 6. Create a Kiggitt password. You can change your password at any time. 7. Enter your Password Reset Question and Answer. This can be used at a later time if you forget your password. 8. Enter your e-mail address. You will receive e-mail notification when new information is available in [address] 9. Click Sign Up. You can now view and download portions of your medical record. 10. Click the Download Summary menu link to download a portable copy of your medical information. If you have questions, please visit the Frequently Asked Questions section of the [employer] website. Remember, [employer] is NOT to be used for urgent needs. For medical emergencies, dial 911. Now available from your iPhone and Android! Providers Seen During Your Hospitalization Provider Specialty Primary office phone Mera Tucker MD Cardiology [phone] Your Primary Care Physician (PCP) Primary Care Physician Office Phone Office Fax Andersonedelmira Hutchins [phone] You are allergic to the following No active allergies Recent Documentation Height Weight BMI Smoking Status 1.803 m 117.9 kg 36.26 kg/m2 Current Every Day Smoker Emergency Contacts Name Discharge Info Relation Home Work Mobile Zenaida Polanco DISCHARGE CAREGIVER [3] Spouse [3] [phone] Bertha Polanco  Spouse [3] [phone] Patient Belongings The following personal items are in your possession at time of discharge: 
     Visual Aid: None Please provide this summary of care documentation to your next provider. Signatures-by signing, you are acknowledging that this After Visit Summary has been reviewed with you and you have received a copy. Patient Signature:  ____________________________________________________________ Date:  ____________________________________________________________  
  
Tracey Fields  Provider Signature: ____________________________________________________________ Date:  ____________________________________________________________

## 2018-03-20 NOTE — H&P
HISTORY OF PRESENT ILLNESS  Darlene Pearson is a 61 y.o. male.     HPI     Patient presents for a follow-up post hospital office visit. He is previously seen by Dr. Mana Hodges. He has a past medical history significant for medication noncompliance. He has chronic atrial fibrillation, a hypertensive cardiomyopathy due to poorly controlled high blood pressure and ongoing tobacco use.     Patient was recently hospitalized for several days at the beginning of February 2018 for hypertensive urgency and congestive heart failure. He was diuresed and started back on his blood pressure medications and his symptoms improved. He underwent an echocardiogram in February 2018 which showed a mildly depressed LV systolic function, EF 01-90% with moderate to severe LVH and severe left atrial enlargement, moderate mitral regurgitation and moderate pulmonary hypertension. RVSP 45 mmHg.     Since hospital discharge, the patient states he has been very compliant with his medications. His blood pressure has been well controlled, however he continues to have shortness of breath with nearly any activity. He denies any orthopnea, PND or leg swelling. He also complains of intermittent left-sided chest pain under his left breast which is nonexertional in nature and more related to his breathing. This will improve when he drinks some water. He denies any prolonged palpitations, severe dizzy spells or christian syncope.          Past Medical History:   Diagnosis Date    Atrial fibrillation ([address] 05/06/2015     Persistent    Diastolic HF (heart failure) (HCC)       EF 45-50% on echocardiogram 2/2018    History of echocardiogram 02/06/2018     LVE. EF 45-50%. No WMA. Mod-severe conc LVH. Indeterminate diastolic fx. RVSP at least 45 mmHg. Severe LAE. Mod ROSEMARIE. Mod MR.    Mod TR. mild to moderate AI    Hypertension      Tobacco use               Current Outpatient Prescriptions   Medication Sig Dispense Refill    potassium chloride (K-DUR, KLOR-CON) 20 mEq tablet Take 1 Tab by mouth two (2) times daily (with meals). 60 Tab 6    furosemide (LASIX) 40 mg tablet Take 1 Tab by mouth Before breakfast and dinner. Or as directed 60 Tab 6    lisinopril (PRINIVIL, ZESTRIL) 20 mg tablet Take 1 Tab by mouth daily. 30 Tab 2    aspirin 81 mg chewable tablet Take 1 Tab by mouth daily. 30 Tab 11    carvedilol (COREG) 25 mg tablet Take 1 Tab by mouth every twelve (12) hours. Indications: Chronic Heart Failure 60 Tab 2    nitroglycerin (NITROSTAT) 0.4 mg SL tablet 1 Tab by SubLINGual route every five (5) minutes as needed for Chest Pain. 1 Bottle 1    methocarbamol (ROBAXIN) 500 mg tablet Take 1 Tab by mouth four (4) times daily. 30 Tab 0      No Known Allergies             Social History   Substance Use Topics    Smoking status: Current Every Day Smoker       Packs/day: 0.50       Years: 40.00    Smokeless tobacco: Never Used         Comment: trying to quit    Alcohol use No            Family History   Problem Relation Age of Onset    Heart Failure Mother      Obesity Mother      Diabetes Father              Review of Systems   Constitutional: Negative for chills, fever and weight loss. HENT: Negative for nosebleeds. Eyes: Negative for blurred vision and double vision. Respiratory: Positive for shortness of breath. Negative for cough and wheezing. Cardiovascular: Positive for chest pain. Negative for palpitations, orthopnea, claudication, leg swelling and PND. Gastrointestinal: Negative for abdominal pain, heartburn, nausea and vomiting. Genitourinary: Negative for dysuria and hematuria. Musculoskeletal: Negative for falls and myalgias. Skin: Negative for rash. Neurological: Negative for dizziness, focal weakness and headaches. Endo/Heme/Allergies: Does not bruise/bleed easily.    Psychiatric/Behavioral: Negative for substance abuse.           Visit Vitals    /80    Pulse 70    Ht 5' 11\" (1.803 m)    Wt 118.4 kg (261 lb)    BMI 36.4 kg/m2         Physical Exam   Constitutional: He is oriented to person, place, and time. He appears well-developed and well-nourished. HENT:   Head: Normocephalic and atraumatic. Eyes: Conjunctivae are normal.   Neck: Neck supple. No JVD present. Carotid bruit is not present. Cardiovascular: Normal rate, S1 normal, S2 normal and normal pulses. An irregularly irregular rhythm present. Exam reveals no gallop and no S3.    Murmur heard. High-pitched blowing holosystolic murmur is present with a grade of 2/6  at the apex  Pulmonary/Chest: Breath sounds normal. He has no wheezes. He has no rales. Abdominal: Soft. Bowel sounds are normal. There is no tenderness. Musculoskeletal: He exhibits no edema. Neurological: He is alert and oriented to person, place, and time. Skin: Skin is warm and dry.      EKG: Atrial fibrillation, voltage criteria for LVH, controlled ventricular rate, leftward axis, no ST or T-wave abnormalities concerning for ischemia. No change compared to the previous EKG.     ASSESSMENT and PLAN       Encounter Diagnoses   Name Primary?  Chronic combined systolic and diastolic congestive heart failure (HCC) Yes    SOB (shortness of breath)      Persistent atrial fibrillation (HCC)      Tobacco use      Chest pain, unspecified type      Essential hypertension        Chronic systolic and diastolic heart failure. Patient's volume status appears stable on his current diuretic regimen. His blood pressure is now well controlled on his current dosage of carvedilol and lisinopril. He continues to have what sounds like NYHA class III symptomatology. I have recommended a right and left heart catheterization to further evaluate his coronary anatomy and his LV and right sided to see if any medications need to be adjusted.     Chest pain.   This is somewhat atypical for angina, however given his risk factors, I would like to definitively evaluate his coronary anatomy with a cardiac catheterization. He last had a cardiac catheterization 10 years ago which did not show any obstructive disease.     Persistent atrial fibrillation. This has been present for at least 3 years if not longer. He was initially started on Xarelto for anticoagulation, however he could not afford this and then was switched to warfarin, however this was eventually stopped because he was not compliant with his INR checks. At this point I would continue to use aspirin for CVA prophylaxis. If his compliance level appears to improve, I would reconsider a trial of warfarin.     Essential hypertension. This has been historically poorly controlled  due to medication noncompliance. Now that he is taking all his medications blood pressure looks excellent. I will continue his current regimen.     Hypertensive cardiomyopathy. Patient recently underwent an echocardiogram in February 2018 which showed a mildly depressed LV systolic function, EF 05-12% with moderate to severe concentric LVH.     Tobacco use disorder. Patient continues to smoke half a pack of cigarettes a day. He was encouraged to quit smoking completely. Will proceed with cardiac catheterization today as scheduled.

## 2018-03-20 NOTE — PROCEDURES
[address] [place] [address] Dr  PROCEDURE NOTE    Ashley Duarte  MR#: [id number]  : 1960  ACCOUNT #: [de-identified]   DATE OF SERVICE: 2018    PROCEDURE PERFORMED:  1. Right heart catheterization with thermodilution technique. 2.  Left heart catheterization, bilateral selective coronary angiography. 3.  Angio-Seal closure device, right common femoral artery. INDICATIONS:  Patient is a 40-year-old man with a recent admission for decompensated heart failure with a prior history of hypertensive cardiomyopathy and mild coronary artery disease. On followup visit, patient was continuing to complain of shortness of breath and chest pains so underwent a diagnostic right and left heart catheterization for further evaluation of his cardiomyopathy and coronary artery disease. DESCRIPTION OF PROCEDURE:  The patient brought to the cardiac catheterization lab for scheduled elective procedure. He was prepped and draped in usual sterile fashion. His right groin area was locally anesthetized using 20 mL of 1% Xylocaine. He received intravenous fentanyl 50 mcg for sedation. Versed was not used because of borderline low blood pressure. Venous access was achieved in the right common femoral vein and a short 7 Pakistani sheath was placed. Arterial access was achieved in the right common femoral artery and a short 6 Pakistani sheath was placed. The right heart catheterization performed in the usual manner using a 7 [place]-Gi catheter. Catheter was advanced up into the pulmonary artery and to the wedge position. Hemodynamics were recorded. Oxygen saturations were taken. Catheter was then removed. Through the arterial sheath, the left heart catheterization was performed in the usual manner using a 6-Pakistani angled pigtail catheter to cross the aortic valve and measure LV hemodynamics.   This catheter was changed out over a guidewire for a 6-Pakistani JR4 catheter, was used to cannulate the left main coronary artery which was viewed in multiple angiographic views. This was then changed out for a guidewire for the JL4 catheter which was used to perform a nonselective angiography of a small nondominant right coronary artery. Catheter and guidewire were removed. Arteriotomy site was visualized via hand injection through the sheath and was successfully closed using a 6-Vietnamese Angio-Seal closure device for adequate hemostasis. The venous sheath was pulled in the holding area with manual compression. COMPLICATIONS:  None. ESTIMATED BLOOD LOSS:  30 mL. CONTRAST:  Total contrast used 100 cc of Isovue. Total radiation dose was 1.68 Gy. Total x-ray time was 13.6 minutes. COMPLICATIONS:  None. SPECIMENS REMOVED:  None. HEMODYNAMIC DATA:  1. Pulmonary capillary wedge pressure was 15 mmHg. 2.  PA pressure was 32/13 with a mean of 18 mmHg, RV pressure was 30/2. Right atrial pressure was 11. LVEDP was 12 mmHg. Aortic pressure was 89/55 with a mean of 69. Cardiac output using the Edin technique was 5.5 liters per minute, cardiac output using the thermodilution technique was 3.3 liters per minute, pulmonary vascular resistance was 1 Toure unit. DESCRIPTION OF ANGIOGRAPHY. 1.  Left main coronary artery was a moderate caliber short vessel bifurcating in LAD and left circumflex angiographically free of disease. 2.  The LAD was a moderate to large caliber vessel that wrapped around the LV apex supplying a portion of the inferior apex, gives rise to two very small diagonal branches. The LAD proper had mild luminal irregularities without obstructive lesions. 3.  Left circumflex was a large caliber dominant vessel, gave rise to a large caliber and very long obtuse marginal branch which terminated along the posterolateral wall. It contained mild luminal irregularities. The left circumflex also gave rise to a small PDA and a small posterolateral branch.   Left circumflex proper had three sequential 30-40% lesions without high-grade obstruction. The left-sided posterolateral branch and PDA contained luminal irregularities. 4.  RCA was a small nondominant vessel with mild luminal irregularities. CONCLUSIONS:  1. Nonischemic cardiomyopathy. Ejection fraction in the 45% range by noninvasive imaging. 2.  Normal left-sided filling pressures,  3. Normal pulmonary capillary wedge pressure. 4.  Mild systemic hypotension, likely from overmedication. 5.  Mild nonobstructive coronary disease. RECOMMENDATIONS:  Will decrease his scheduled lisinopril dosage down to 10 mg daily and a scheduled Lasix dosage down to 20 mg daily. We will recommend that he stop the potassium altogether. We will continue his current carvedilol dosage.       MD Lona Duong / Rosalino  D: 03/20/2018 11:12     T: 03/20/2018 12:20  JOB #: [id number]

## 2018-03-20 NOTE — ROUTINE PROCESS
TRANSFER - OUT REPORT:    Verbal report given to Darrell Siddiqui RN(name) on Gwenith First  being transferred to [facility](unit) for routine progression of care       Report consisted of patients Situation, Background, Assessment and   Recommendations(SBAR). Information from the following report(s) SBAR, Kardex, Procedure Summary and MAR was reviewed with the receiving nurse. Lines:   Peripheral IV 03/20/18 Left Hand (Active)   Site Assessment Clean, dry, & intact 3/20/2018  8:00 AM   Phlebitis Assessment 0 3/20/2018  8:00 AM   Infiltration Assessment 0 3/20/2018  8:00 AM   Dressing Status Clean, dry, & intact 3/20/2018  8:00 AM   Hub Color/Line Status Pink;Flushed 3/20/2018  8:00 AM       Peripheral IV 03/20/18 Right Hand (Active)   Site Assessment Clean, dry, & intact 3/20/2018  8:00 AM   Phlebitis Assessment 0 3/20/2018  8:00 AM   Infiltration Assessment 0 3/20/2018  8:00 AM   Dressing Status Clean, dry, & intact 3/20/2018  8:00 AM   Dressing Type Transparent 3/20/2018  8:00 AM   Hub Color/Line Status Pink;Flushed 3/20/2018  8:00 AM        Opportunity for questions and clarification was provided.       Patient transported with:   Fanzila

## 2018-03-20 NOTE — DISCHARGE INSTRUCTIONS
HEART CATHETERIZATION/ANGIOGRAPHY DISCHARGE INSTRUCTIONS    1. Check puncture site frequently for swelling or bleeding. If there is any bleeding, lie down and apply pressure over the area with a clean towel or washcloth. Notify your doctor for any redness, swelling, drainage, or oozing from the puncture site. Notify your doctor for any fever or chills. 2. If the extremity becomes cold, numb, or painful call Dr. Bessie Douglas at [phone].  3. Activity should be limited for the next 48 hours. Climb stairs as little as possible and avoid any stooping, bending, or strenuous activity for 48 hours. No heavy lifting (anything over 10 pounds) for 3 days. 4. You may resume your usual diet. Drink more fluids than usual.  5. Have a responsible person drive you home and stay with you for at least 24 hours after your heart catheterization/angiography. 6. You may remove bandage from your Right and Groin in 24 hours. You may shower in 24 hours. No tub baths, hot tubs, or swimming for 1 week. Do not place any lotions, creams, powders, or ointments over puncture site for 1 week. You may place a clean band-aid over the puncture site each day for 5 days. Change daily. DISCHARGE SUMMARY from Nurse    PATIENT INSTRUCTIONS:    After general anesthesia or intravenous sedation, for 24 hours or while taking prescription Narcotics:  · Limit your activities  · Do not drive and operate hazardous machinery  · Do not make important personal or business decisions  · Do  not drink alcoholic beverages  · If you have not urinated within 8 hours after discharge, please contact your surgeon on call.     Report the following to your surgeon:  · Excessive pain, swelling, redness or odor of or around the surgical area  · Temperature over 100.5  · Nausea and vomiting lasting longer than 4 hours or if unable to take medications  · Any signs of decreased circulation or nerve impairment to extremity: change in color, persistent  numbness, tingling, coldness or increase pain  · Any questions     *  Please give a list of your current medications to your Primary Care Provider. *  Please update this list whenever your medications are discontinued, doses are      changed, or new medications (including over-the-counter products) are added. *  Please carry medication information at all times in case of emergency situations. These are general instructions for a healthy lifestyle:    No smoking/ No tobacco products/ Avoid exposure to second hand smoke  Surgeon General's Warning:  Quitting smoking now greatly reduces serious risk to your health. Obesity, smoking, and sedentary lifestyle greatly increases your risk for illness    A healthy diet, regular physical exercise & weight monitoring are important for maintaining a healthy lifestyle    You may be retaining fluid if you have a history of heart failure or if you experience any of the following symptoms:  Weight gain of 3 pounds or more overnight or 5 pounds in a week, increased swelling in our hands or feet or shortness of breath while lying flat in bed. Please call your doctor as soon as you notice any of these symptoms; do not wait until your next office visit. Recognize signs and symptoms of STROKE:    F-face looks uneven    A-arms unable to move or move unevenly    S-speech slurred or non-existent    T-time-call 911 as soon as signs and symptoms begin-DO NOT go       Back to bed or wait to see if you get better-TIME IS BRAIN. Warning Signs of HEART ATTACK     Call 911 if you have these symptoms:   Chest discomfort. Most heart attacks involve discomfort in the center of the chest that lasts more than a few minutes, or that goes away and comes back. It can feel like uncomfortable pressure, squeezing, fullness, or pain.  Discomfort in other areas of the upper body. Symptoms can include pain or discomfort in one or both arms, the back, neck, jaw, or stomach.    Shortness of breath with or without chest discomfort.  Other signs may include breaking out in a cold sweat, nausea, or lightheadedness. Don't wait more than five minutes to call 911 - MINUTES MATTER! Fast action can save your life. Calling 911 is almost always the fastest way to get lifesaving treatment. Emergency Medical Services staff can begin treatment when they arrive -- up to an hour sooner than if someone gets to the hospital by car. The discharge information has been reviewed with the patient. The patient verbalized understanding. Discharge medications reviewed with the patient and appropriate educational materials and side effects teaching were provided.

## 2018-03-28 ENCOUNTER — TELEPHONE (OUTPATIENT)
Dept: CARDIOLOGY CLINIC | Age: 58
End: 2018-03-28

## 2018-03-28 DIAGNOSIS — I50.30 DIASTOLIC HEART FAILURE, UNSPECIFIED HEART FAILURE CHRONICITY: Primary | ICD-10-CM

## 2018-03-28 NOTE — TELEPHONE ENCOUNTER
----- Message from Clifford Connor MD sent at 3/15/2018  5:30 PM EDT -----  Yes it is okay but have the patient hold his lisinopril, Lasix, and potassium for 48 hours prior to his cardiac catheterization.   A repeat Chem-7 panel can be checked the morning of the procedure.  ----- Message -----     From: Getachew Granado RN     Sent: 3/14/2018  11:21 AM       To: MD Lolis Eason for right and left cath, Kidney function elevated compared to last labs

## 2018-04-16 ENCOUNTER — OFFICE VISIT (OUTPATIENT)
Dept: CARDIOLOGY CLINIC | Age: 58
End: 2018-04-16

## 2018-04-16 VITALS
WEIGHT: 275 LBS | HEART RATE: 75 BPM | DIASTOLIC BLOOD PRESSURE: 90 MMHG | OXYGEN SATURATION: 97 % | HEIGHT: 71 IN | SYSTOLIC BLOOD PRESSURE: 134 MMHG | BODY MASS INDEX: 38.5 KG/M2

## 2018-04-16 DIAGNOSIS — I48.19 PERSISTENT ATRIAL FIBRILLATION (HCC): ICD-10-CM

## 2018-04-16 DIAGNOSIS — I50.30 DIASTOLIC HEART FAILURE, UNSPECIFIED HF CHRONICITY (HCC): ICD-10-CM

## 2018-04-16 DIAGNOSIS — Z72.0 TOBACCO USE: ICD-10-CM

## 2018-04-16 DIAGNOSIS — I50.42 CHRONIC COMBINED SYSTOLIC AND DIASTOLIC CONGESTIVE HEART FAILURE (HCC): Primary | ICD-10-CM

## 2018-04-16 DIAGNOSIS — I10 ESSENTIAL HYPERTENSION: ICD-10-CM

## 2018-04-16 RX ORDER — LISINOPRIL 20 MG/1
20 TABLET ORAL DAILY
Qty: 1 TAB | Refills: 0
Start: 2018-04-16 | End: 2018-04-27

## 2018-04-16 RX ORDER — FUROSEMIDE 40 MG/1
40 TABLET ORAL DAILY
Qty: 1 TAB | Refills: 0
Start: 2018-04-16 | End: 2018-07-20 | Stop reason: SDUPTHER

## 2018-04-16 NOTE — MR AVS SNAPSHOT
[address] [place] [address] Thelma Asif [id number] [phone] Patient: Bita Brooks MRN: [id number] SWN:[id number] Visit Information Date & Time Provider Department Dept. Phone Encounter #  
 4/16/2018 10:40 AM Georgia Diaz MD [facility] [address] [id number] Upcoming Health Maintenance Date Due Hepatitis C Screening 1960 DTaP/Tdap/Td series (1 - Tdap) 6/4/1980 FOBT Q 1 YEAR AGE 50-75 6/4/2009 MEDICARE YEARLY EXAM 3/14/2018 Allergies as of 4/16/2018  Review Complete On: 3/20/2018 By: Oseas Dawson No Known Allergies Current Immunizations  Never Reviewed Name Date Influenza Vaccine (Quad) PF 2/7/2018 11:01 AM  
 Pneumococcal Polysaccharide (PPSV-23) 5/9/2015  1:45 PM  
  
 Not reviewed this visit You Were Diagnosed With   
  
 Codes Comments Chronic combined systolic and diastolic congestive heart failure (HCC)    -  Primary ICD-10-CM: I50.42 
ICD-9-CM: 428.42, 428.0 Diastolic heart failure, unspecified HF chronicity (HCC)     ICD-10-CM: I50.30 ICD-9-CM: 428.30 Essential hypertension     ICD-10-CM: I10 
ICD-9-CM: 401.9 Persistent atrial fibrillation (HCC)     ICD-10-CM: I48.1 ICD-9-CM: 427.31 Shortness of breath     ICD-10-CM: R06.02 
ICD-9-CM: 786.05 Vitals BP Pulse Height(growth percentile) Weight(growth percentile) SpO2 BMI  
 134/90 75 5' 11\" (1.803 m) 275 lb (124.7 kg) 97% 38.35 kg/m2 Smoking Status Current Every Day Smoker Vitals History BMI and BSA Data Body Mass Index Body Surface Area  
 38.35 kg/m 2 2.5 m 2 Preferred Pharmacy Pharmacy Name Phone [facility] [address] [facility] [phone] Your Updated Medication List  
  
   
This list is accurate as of 4/16/18 11:14 AM.  Always use your most recent med list.  
  
  
  
  
 aspirin 81 mg chewable tablet Take 1 Tab by mouth daily. carvedilol 25 mg tablet Commonly known as:  Niraj Hirschfeld Take 1 Tab by mouth every twelve (12) hours. Indications: Chronic Heart Failure  
  
 furosemide 40 mg tablet Commonly known as:  LASIX Take 0.5 Tabs by mouth Before breakfast and dinner. Or as directed  
  
 lisinopril 20 mg tablet Commonly known as:  Mone Riddleton Take 0.5 Tabs by mouth daily. methocarbamol 500 mg tablet Commonly known as:  ROBAXIN Take 1 Tab by mouth four (4) times daily. nitroglycerin 0.4 mg SL tablet Commonly known as:  NITROSTAT  
1 Tab by SubLINGual route every five (5) minutes as needed for Chest Pain. We Performed the Following AMB POC EKG ROUTINE W/ 12 LEADS, INTER & REP [44086 CPT(R)] Introducing [facility] SERVICES! Latha Smith introduces [employer] patient portal. Now you can access parts of your medical record, email your doctor's office, and request medication refills online. 1. In your internet browser, go to [email or web address] 2. Click on the First Time User? Click Here link in the Sign In box. You will see the New Member Sign Up page. 3. Enter your [employer] Access Code exactly as it appears below. You will not need to use this code after youve completed the sign-up process. If you do not sign up before the expiration date, you must request a new code. · [employer] Access Code: [email or web address] Expires: 5/9/2018  9:34 AM 
 
4. Enter the last four digits of your Social Security Number (xxxx) and Date of Birth (mm/dd/yyyy) as indicated and click Submit. You will be taken to the next sign-up page. 5. Create a [employer] ID. This will be your [employer] login ID and cannot be changed, so think of one that is secure and easy to remember. 6. Create a [employer] password. You can change your password at any time. 7. Enter your Password Reset Question and Answer.  This can be used at a later time if you forget your password. 8. Enter your e-mail address. You will receive e-mail notification when new information is available in [address] 9. Click Sign Up. You can now view and download portions of your medical record. 10. Click the Download Summary menu link to download a portable copy of your medical information. If you have questions, please visit the Frequently Asked Questions section of the [employer] ([facility]) website. Remember, [employer] [facility] [employer] [facility]) is NOT to be used for urgent needs. For medical emergencies, dial 911. Now available from your iPhone and Android! Please provide this summary of care documentation to your next provider. Your primary care clinician is listed as Myriam Leonardo. If you have any questions after today's visit, please call [phone].

## 2018-04-16 NOTE — PROGRESS NOTES
1. Have you been to the ER, urgent care clinic since your last visit? Hospitalized since your last visit? Yes, 3/20/18 for cath     2. Have you seen or consulted any other health care providers outside of the [address] [place] [address] since your last visit? Include any pap smears or colon screening.  No

## 2018-04-16 NOTE — PROGRESS NOTES
HISTORY OF PRESENT ILLNESS  Ramiro Campbell is a 61 y.o. male. Hospital Follow Up   Associated symptoms include shortness of breath. Pertinent negatives include no chest pain, no abdominal pain and no headaches. Shortness of Breath   Pertinent negatives include no fever, no headaches, no cough, no wheezing, no PND, no orthopnea, no chest pain, no vomiting, no abdominal pain, no rash, no leg swelling and no claudication. Leg Swelling   Associated symptoms include shortness of breath. Pertinent negatives include no chest pain, no abdominal pain and no headaches. Patient presents for a follow-up office visit. He is previously seen by Dr. Bea Chavarria. He has a past medical history significant for medication noncompliance. He has chronic atrial fibrillation, a hypertensive cardiomyopathy due to poorly controlled high blood pressure and ongoing tobacco use. Patient was last hospitalized for several days at the beginning of February 2018 for hypertensive urgency and congestive heart failure. He was diuresed and started back on his blood pressure medications and his symptoms improved. He underwent an echocardiogram in February 2018 which showed a mildly depressed LV systolic function, EF 90-44% with moderate to severe LVH and severe left atrial enlargement, moderate mitral regurgitation and moderate pulmonary hypertension. RVSP 45 mmHg. Patient underwent a right and left heart catheterization in March 2018 which demonstrated mild nonobstructive coronary disease involving his left circumflex artery. He had normal left ventricular ileum pressures, LVEDP 12 mmHg. Normal right sided heart pressures, PCWP 15 mmHg, PA 32/13 with a mean of 18 mmHg. At that time he was found to be mildly hypotensive and was felt to be over diuresed. His Lasix dosage was decreased to 20 mg daily, but the patient increase it back to 40 mg on his own because he noted weight gain.     Patient reports that he has been feeling well on the increased Lasix dosage which is actually his baseline dosage now. He denies any worsening shortness of breath or leg swelling, no orthopnea, no PND. Past Medical History:   Diagnosis Date    Atrial fibrillation ([address] 05/06/2015    Persistent    Diastolic HF (heart failure) (HCC)     EF 45-50% on echocardiogram 2/2018    History of echocardiogram 02/06/2018    LVE. EF 45-50%. No WMA. Mod-severe conc LVH. Indeterminate diastolic fx. RVSP at least 45 mmHg. Severe LAE. Mod ROSEMARIE. Mod MR. Mod TR. mild to moderate AI    Hypertension     Tobacco use      Current Outpatient Prescriptions   Medication Sig Dispense Refill    furosemide (LASIX) 40 mg tablet Take 0.5 Tabs by mouth Before breakfast and dinner. Or as directed (Patient taking differently: Take 40 mg by mouth Before breakfast and dinner. Or as directed) 60 Tab 6    lisinopril (PRINIVIL, ZESTRIL) 20 mg tablet Take 0.5 Tabs by mouth daily. (Patient taking differently: Take 20 mg by mouth daily.) 30 Tab 2    aspirin 81 mg chewable tablet Take 1 Tab by mouth daily. 30 Tab 11    carvedilol (COREG) 25 mg tablet Take 1 Tab by mouth every twelve (12) hours. Indications: Chronic Heart Failure 60 Tab 2    nitroglycerin (NITROSTAT) 0.4 mg SL tablet 1 Tab by SubLINGual route every five (5) minutes as needed for Chest Pain. 1 Bottle 1    methocarbamol (ROBAXIN) 500 mg tablet Take 1 Tab by mouth four (4) times daily. 30 Tab 0     No Known Allergies     Social History   Substance Use Topics    Smoking status: Current Every Day Smoker     Packs/day: 0.50     Years: 40.00    Smokeless tobacco: Never Used      Comment: trying to quit    Alcohol use No     Family History   Problem Relation Age of Onset    Heart Failure Mother     Obesity Mother     Diabetes Father          Review of Systems   Constitutional: Negative for chills, fever and weight loss. HENT: Negative for nosebleeds. Eyes: Negative for blurred vision and double vision. Respiratory: Positive for shortness of breath. Negative for cough and wheezing. Cardiovascular: Negative for chest pain, palpitations, orthopnea, claudication, leg swelling and PND. Gastrointestinal: Negative for abdominal pain, heartburn, nausea and vomiting. Genitourinary: Negative for dysuria and hematuria. Musculoskeletal: Negative for falls and myalgias. Skin: Negative for rash. Neurological: Negative for dizziness, focal weakness and headaches. Endo/Heme/Allergies: Does not bruise/bleed easily. Psychiatric/Behavioral: Negative for substance abuse. Visit Vitals    /90    Pulse 75    Ht 5' 11\" (1.803 m)    Wt 124.7 kg (275 lb)    SpO2 97%    BMI 38.35 kg/m2       Physical Exam   Constitutional: He is oriented to person, place, and time. He appears well-developed and well-nourished. HENT:   Head: Normocephalic and atraumatic. Eyes: Conjunctivae are normal.   Neck: Neck supple. No JVD present. Carotid bruit is not present. Cardiovascular: Normal rate, S1 normal, S2 normal and normal pulses. An irregularly irregular rhythm present. Exam reveals no gallop and no S3.    Murmur heard. High-pitched blowing holosystolic murmur is present with a grade of 2/6  at the apex  Pulmonary/Chest: Breath sounds normal. He has no wheezes. He has no rales. Abdominal: Soft. Bowel sounds are normal. There is no tenderness. Musculoskeletal: He exhibits no edema. Neurological: He is alert and oriented to person, place, and time. Skin: Skin is warm and dry. EKG: Atrial fibrillation, normal axis, voltage criteria for LVH, no ST or T-wave abnormality concerning for ischemia. No change compared to the previous EKG. ASSESSMENT and PLAN    Chronic systolic and diastolic heart failure. Patient's volume status appears stable on his current diuretic regimen. His blood pressure is now well controlled on his current dosage of carvedilol and lisinopril.   Now has NYHA class II symptomatology which appears to be his baseline. I will continue his current medical regimen and current diuretic regimen. Mild nonobstructive coronary artery disease seen on recent cardiac catheterization in March 2018. Persistent atrial fibrillation. This has been present for at least 3 years if not longer. He was initially started on Xarelto for anticoagulation, however he could not afford this and then was switched to warfarin, however this was eventually stopped because he was not compliant with his INR checks. At this point I would continue to use aspirin for CVA prophylaxis. If his compliance level appears to improve, I would reconsider a trial of warfarin in the future. Essential hypertension. This has been historically poorly controlled  due to medication noncompliance. Now that he is taking all his medications blood pressure looks excellent. I will continue his current regimen. Hypertensive cardiomyopathy. Patient recently underwent an echocardiogram in February 2018 which showed a mildly depressed LV systolic function, EF 25-94% with moderate to severe concentric LVH. Tobacco use disorder. Patient continues to smoke half a pack of cigarettes a day. He was encouraged to quit smoking completely. Follow-up in 4 months, sooner if needed.

## 2018-04-27 ENCOUNTER — HOSPITAL ENCOUNTER (EMERGENCY)
Age: 58
Discharge: HOME | End: 2018-04-27
Attending: EMERGENCY MEDICINE
Payer: MEDICARE

## 2018-04-27 ENCOUNTER — APPOINTMENT (OUTPATIENT)
Dept: GENERAL RADIOLOGY | Age: 58
End: 2018-04-27
Attending: EMERGENCY MEDICINE
Payer: MEDICARE

## 2018-04-27 VITALS
DIASTOLIC BLOOD PRESSURE: 69 MMHG | OXYGEN SATURATION: 95 % | SYSTOLIC BLOOD PRESSURE: 148 MMHG | RESPIRATION RATE: 18 BRPM | TEMPERATURE: 97 F | HEART RATE: 73 BPM

## 2018-04-27 DIAGNOSIS — I10 ACCELERATED HYPERTENSION: ICD-10-CM

## 2018-04-27 DIAGNOSIS — I50.43 ACUTE ON CHRONIC COMBINED SYSTOLIC AND DIASTOLIC CONGESTIVE HEART FAILURE (HCC): Primary | ICD-10-CM

## 2018-04-27 LAB
ALBUMIN SERPL-MCNC: 3.8 G/DL (ref 3.4–5)
ALBUMIN/GLOB SERPL: 1 {RATIO} (ref 0.8–1.7)
ALP SERPL-CCNC: 90 U/L (ref 45–117)
ALT SERPL-CCNC: 23 U/L (ref 16–61)
ANION GAP SERPL CALC-SCNC: 2 MMOL/L (ref 3–18)
AST SERPL-CCNC: 11 U/L (ref 15–37)
ATRIAL RATE: 326 BPM
BASOPHILS # BLD: 0 K/UL (ref 0–0.06)
BASOPHILS NFR BLD: 0 % (ref 0–2)
BILIRUB SERPL-MCNC: 1.5 MG/DL (ref 0.2–1)
BNP SERPL-MCNC: 5595 PG/ML (ref 0–900)
BUN SERPL-MCNC: 13 MG/DL (ref 7–18)
BUN/CREAT SERPL: 13 (ref 12–20)
CALCIUM SERPL-MCNC: 10.1 MG/DL (ref 8.5–10.1)
CALCULATED R AXIS, ECG10: 13 DEGREES
CALCULATED T AXIS, ECG11: 69 DEGREES
CHLORIDE SERPL-SCNC: 106 MMOL/L (ref 100–108)
CK MB CFR SERPL CALC: 2 % (ref 0–4)
CK MB SERPL-MCNC: 1.4 NG/ML (ref 5–25)
CK SERPL-CCNC: 71 U/L (ref 39–308)
CO2 SERPL-SCNC: 37 MMOL/L (ref 21–32)
CREAT SERPL-MCNC: 1.03 MG/DL (ref 0.6–1.3)
DIAGNOSIS, 93000: NORMAL
DIFFERENTIAL METHOD BLD: ABNORMAL
EOSINOPHIL # BLD: 0.1 K/UL (ref 0–0.4)
EOSINOPHIL NFR BLD: 1 % (ref 0–5)
ERYTHROCYTE [DISTWIDTH] IN BLOOD BY AUTOMATED COUNT: 16.4 % (ref 11.6–14.5)
GLOBULIN SER CALC-MCNC: 4 G/DL (ref 2–4)
GLUCOSE SERPL-MCNC: 122 MG/DL (ref 74–99)
HCT VFR BLD AUTO: 41.2 % (ref 36–48)
HGB BLD-MCNC: 14.3 G/DL (ref 13–16)
LYMPHOCYTES # BLD: 1.6 K/UL (ref 0.9–3.6)
LYMPHOCYTES NFR BLD: 20 % (ref 21–52)
MCH RBC QN AUTO: 28.7 PG (ref 24–34)
MCHC RBC AUTO-ENTMCNC: 34.7 G/DL (ref 31–37)
MCV RBC AUTO: 82.7 FL (ref 74–97)
MONOCYTES # BLD: 0.7 K/UL (ref 0.05–1.2)
MONOCYTES NFR BLD: 9 % (ref 3–10)
NEUTS SEG # BLD: 5.4 K/UL (ref 1.8–8)
NEUTS SEG NFR BLD: 70 % (ref 40–73)
PLATELET # BLD AUTO: 104 K/UL (ref 135–420)
PMV BLD AUTO: 12.3 FL (ref 9.2–11.8)
POTASSIUM SERPL-SCNC: 3.7 MMOL/L (ref 3.5–5.5)
PROT SERPL-MCNC: 7.8 G/DL (ref 6.4–8.2)
Q-T INTERVAL, ECG07: 372 MS
QRS DURATION, ECG06: 114 MS
QTC CALCULATION (BEZET), ECG08: 500 MS
RBC # BLD AUTO: 4.98 M/UL (ref 4.7–5.5)
SODIUM SERPL-SCNC: 145 MMOL/L (ref 136–145)
TROPONIN I SERPL-MCNC: <0.02 NG/ML (ref 0–0.04)
VENTRICULAR RATE, ECG03: 109 BPM
WBC # BLD AUTO: 7.8 K/UL (ref 4.6–13.2)

## 2018-04-27 PROCEDURE — 71045 X-RAY EXAM CHEST 1 VIEW: CPT

## 2018-04-27 PROCEDURE — 74011250637 HC RX REV CODE- 250/637: Performed by: EMERGENCY MEDICINE

## 2018-04-27 PROCEDURE — 99285 EMERGENCY DEPT VISIT HI MDM: CPT

## 2018-04-27 PROCEDURE — 96375 TX/PRO/DX INJ NEW DRUG ADDON: CPT

## 2018-04-27 PROCEDURE — 96374 THER/PROPH/DIAG INJ IV PUSH: CPT

## 2018-04-27 PROCEDURE — 82550 ASSAY OF CK (CPK): CPT | Performed by: EMERGENCY MEDICINE

## 2018-04-27 PROCEDURE — 83880 ASSAY OF NATRIURETIC PEPTIDE: CPT | Performed by: EMERGENCY MEDICINE

## 2018-04-27 PROCEDURE — 80053 COMPREHEN METABOLIC PANEL: CPT | Performed by: EMERGENCY MEDICINE

## 2018-04-27 PROCEDURE — 93005 ELECTROCARDIOGRAM TRACING: CPT

## 2018-04-27 PROCEDURE — 74011250636 HC RX REV CODE- 250/636: Performed by: EMERGENCY MEDICINE

## 2018-04-27 PROCEDURE — 96376 TX/PRO/DX INJ SAME DRUG ADON: CPT

## 2018-04-27 PROCEDURE — 85025 COMPLETE CBC W/AUTO DIFF WBC: CPT | Performed by: EMERGENCY MEDICINE

## 2018-04-27 RX ORDER — CARVEDILOL 25 MG/1
25 TABLET ORAL
Status: COMPLETED | OUTPATIENT
Start: 2018-04-27 | End: 2018-04-27

## 2018-04-27 RX ORDER — LABETALOL HCL 20 MG/4 ML
20 SYRINGE (ML) INTRAVENOUS
Status: COMPLETED | OUTPATIENT
Start: 2018-04-27 | End: 2018-04-27

## 2018-04-27 RX ORDER — LISINOPRIL 20 MG/1
20 TABLET ORAL
Status: COMPLETED | OUTPATIENT
Start: 2018-04-27 | End: 2018-04-27

## 2018-04-27 RX ORDER — HYDRALAZINE HYDROCHLORIDE 20 MG/ML
20 INJECTION INTRAMUSCULAR; INTRAVENOUS ONCE
Status: COMPLETED | OUTPATIENT
Start: 2018-04-27 | End: 2018-04-27

## 2018-04-27 RX ORDER — FUROSEMIDE 10 MG/ML
20 INJECTION INTRAMUSCULAR; INTRAVENOUS
Status: COMPLETED | OUTPATIENT
Start: 2018-04-27 | End: 2018-04-27

## 2018-04-27 RX ORDER — FUROSEMIDE 10 MG/ML
40 INJECTION INTRAMUSCULAR; INTRAVENOUS
Status: DISCONTINUED | OUTPATIENT
Start: 2018-04-27 | End: 2018-04-27

## 2018-04-27 RX ORDER — FUROSEMIDE 10 MG/ML
40 INJECTION INTRAMUSCULAR; INTRAVENOUS
Status: COMPLETED | OUTPATIENT
Start: 2018-04-27 | End: 2018-04-27

## 2018-04-27 RX ORDER — CARVEDILOL 25 MG/1
25 TABLET ORAL EVERY 12 HOURS
Qty: 60 TAB | Refills: 2 | Status: ON HOLD | OUTPATIENT
Start: 2018-04-27 | End: 2018-10-29 | Stop reason: SDUPTHER

## 2018-04-27 RX ORDER — LISINOPRIL 20 MG/1
20 TABLET ORAL DAILY
Qty: 1 TAB | Refills: 0 | Status: SHIPPED | OUTPATIENT
Start: 2018-04-27 | End: 2018-06-04 | Stop reason: SDUPTHER

## 2018-04-27 RX ORDER — ASPIRIN 325 MG
325 TABLET ORAL
Status: COMPLETED | OUTPATIENT
Start: 2018-04-27 | End: 2018-04-27

## 2018-04-27 RX ADMIN — ASPIRIN 325 MG ORAL TABLET 325 MG: 325 PILL ORAL at 09:11

## 2018-04-27 RX ADMIN — FUROSEMIDE 20 MG: 10 INJECTION, SOLUTION INTRAVENOUS at 10:33

## 2018-04-27 RX ADMIN — HYDRALAZINE HYDROCHLORIDE 20 MG: 20 INJECTION INTRAMUSCULAR; INTRAVENOUS at 14:20

## 2018-04-27 RX ADMIN — LABETALOL 20 MG/4 ML (5 MG/ML) INTRAVENOUS SYRINGE 20 MG: at 11:42

## 2018-04-27 RX ADMIN — NITROGLYCERIN 1 INCH: 20 OINTMENT TOPICAL at 09:12

## 2018-04-27 RX ADMIN — CARVEDILOL 25 MG: 25 TABLET, FILM COATED ORAL at 09:17

## 2018-04-27 RX ADMIN — HYDRALAZINE HYDROCHLORIDE 20 MG: 20 INJECTION INTRAMUSCULAR; INTRAVENOUS at 13:05

## 2018-04-27 RX ADMIN — LABETALOL 20 MG/4 ML (5 MG/ML) INTRAVENOUS SYRINGE 20 MG: at 09:15

## 2018-04-27 RX ADMIN — FUROSEMIDE 40 MG: 10 INJECTION, SOLUTION INTRAMUSCULAR; INTRAVENOUS at 11:42

## 2018-04-27 RX ADMIN — LISINOPRIL 20 MG: 20 TABLET ORAL at 09:17

## 2018-04-27 NOTE — ED PROVIDER NOTES
EMERGENCY DEPARTMENT HISTORY AND PHYSICAL EXAM    8:49 AM      Date: 4/27/2018  Patient Name: Vikram Greene    History of Presenting Illness     Chief Complaint   Patient presents with    Chest Pain         History Provided By: Patient    Chief Complaint: Chest Tightness  Duration: Earlier this morning   Timing:  Constant and Worsening  Location: Chest   Quality: Tightness  Severity: Mild  Modifying Factors: None   Associated Symptoms: SOB       Additional History (Context): Vikram Greene is a 61 y.o. male with hx of HTN, a-fib, cardiomyopathy and CHF, presenting to the ED with c/o constant, worsening chest tightness that began earlier this morning. Pt has not been compliant with his medications or fluid restrictions. States he ran out of his BP medications and ASA 2 days ago, otherwise reports he takes \"a whole fluid pill\". He recently had a heart cath, which showed no signs of coronary artery disease. Pt is followed by Dr. Kaiden Santo, cardiology. Notes sx are consistent with prior CHF exacerbation. Denies any fever, chills, abdominal pain, nausea, vomiting, diarrhea or urinary sx. Associated sx include SOB. Severity is mild. No other sx or complaints given at this time. PCP: Emily Acosta MD    Current Facility-Administered Medications   Medication Dose Route Frequency Provider Last Rate Last Dose    hydrALAZINE (APRESOLINE) 20 mg/mL injection 20 mg  20 mg IntraVENous ONCE Cihto Solis MD         Current Outpatient Prescriptions   Medication Sig Dispense Refill    carvedilol (COREG) 25 mg tablet Take 1 Tab by mouth every twelve (12) hours. Indications: chronic heart failure 60 Tab 2    lisinopril (PRINIVIL, ZESTRIL) 20 mg tablet Take 1 Tab by mouth daily. 1 Tab 0    furosemide (LASIX) 40 mg tablet Take 1 Tab by mouth daily. Or as directed 1 Tab 0    aspirin 81 mg chewable tablet Take 1 Tab by mouth daily.  30 Tab 11    nitroglycerin (NITROSTAT) 0.4 mg SL tablet 1 Tab by SubLINGual route every five (5) minutes as needed for Chest Pain. 1 Bottle 1    methocarbamol (ROBAXIN) 500 mg tablet Take 1 Tab by mouth four (4) times daily. 30 Tab 0       Past History     Past Medical History:  Past Medical History:   Diagnosis Date    Atrial fibrillation ([address] 05/06/2015    Persistent    Diastolic HF (heart failure) (HCC)     EF 45-50% on echocardiogram 2/2018    History of echocardiogram 02/06/2018    LVE. EF 45-50%. No WMA. Mod-severe conc LVH. Indeterminate diastolic fx. RVSP at least 45 mmHg. Severe LAE. Mod ROSEMARIE. Mod MR. Mod TR. mild to moderate AI    Hypertension     Tobacco use        Past Surgical History:  Past Surgical History:   Procedure Laterality Date    HX HERNIA REPAIR      HX HIP REPLACEMENT Bilateral     right in March 2013, left May 2013       Family History:  Family History   Problem Relation Age of Onset    Heart Failure Mother     Obesity Mother     Diabetes Father        Social History:  Social History   Substance Use Topics    Smoking status: Current Every Day Smoker     Packs/day: 0.50     Years: 40.00    Smokeless tobacco: Never Used      Comment: trying to quit    Alcohol use No       Allergies:  No Known Allergies      Review of Systems       Review of Systems   Constitutional: Negative for activity change, appetite change, chills, diaphoresis, fatigue, fever and unexpected weight change. HENT: Negative for congestion, dental problem, drooling, ear discharge, ear pain, facial swelling, hearing loss, mouth sores, nosebleeds, postnasal drip, rhinorrhea, sinus pressure, sneezing, sore throat, tinnitus and trouble swallowing. Eyes: Negative for photophobia, pain, discharge, redness, itching and visual disturbance. Respiratory: Positive for chest tightness and shortness of breath. Negative for apnea, cough, choking, wheezing and stridor. Cardiovascular: Negative for chest pain, palpitations and leg swelling.    Gastrointestinal: Negative for abdominal distention, abdominal pain, anal bleeding, blood in stool, constipation, diarrhea, nausea, rectal pain and vomiting. Endocrine: Negative for cold intolerance, heat intolerance, polydipsia, polyphagia and polyuria. Genitourinary: Negative for decreased urine volume, difficulty urinating, dysuria, enuresis, flank pain, frequency, genital sores, hematuria and urgency. Musculoskeletal: Negative for arthralgias, back pain, gait problem, joint swelling, myalgias, neck pain and neck stiffness. Skin: Negative for color change, pallor, rash and wound. Allergic/Immunologic: Negative for environmental allergies, food allergies and immunocompromised state. Neurological: Negative for dizziness, tremors, seizures, syncope, facial asymmetry, speech difficulty, weakness, light-headedness, numbness and headaches. Hematological: Negative for adenopathy. Does not bruise/bleed easily. Psychiatric/Behavioral: Negative for agitation, behavioral problems, confusion, decreased concentration, dysphoric mood, hallucinations, self-injury, sleep disturbance and suicidal ideas. The patient is not nervous/anxious and is not hyperactive. Physical Exam     Visit Vitals    /69    Pulse 73    Temp 97 °F (36.1 °C)    Resp 18    SpO2 95%         Physical Exam   Constitutional: He is oriented to person, place, and time. He appears well-developed and well-nourished. Alert and appropriate with apparent discomfort due to mild tachypnea and SOB without signs of severe acute respiratory distress   HENT:   Head: Normocephalic and atraumatic. Right Ear: External ear normal.   Left Ear: External ear normal.   Mouth/Throat: Oropharynx is clear and moist. No oropharyngeal exudate. Eyes: Conjunctivae and EOM are normal. Pupils are equal, round, and reactive to light. Right eye exhibits no discharge. Left eye exhibits no discharge. No scleral icterus. Neck: Normal range of motion. Neck supple. No JVD present.  No tracheal deviation present. No thyromegaly present. Cardiovascular: Normal rate, regular rhythm and intact distal pulses. Exam reveals no gallop and no friction rub. Murmur heard. Pulmonary/Chest: Effort normal. No stridor. No respiratory distress. He has no wheezes. He has no rales. Symmetrically diminished breath sounds at bases   Abdominal: Soft. Bowel sounds are normal. He exhibits no distension. There is no tenderness. There is no rebound and no guarding. Musculoskeletal: Normal range of motion. He exhibits no edema or tenderness. Lymphadenopathy:     He has no cervical adenopathy. Neurological: He is alert and oriented to person, place, and time. No cranial nerve deficit. Coordination normal.   Skin: Skin is warm. No rash noted. No erythema. Psychiatric: He has a normal mood and affect. His behavior is normal. Judgment and thought content normal.   Nursing note and vitals reviewed. Diagnostic Study Results     Labs -  Recent Results (from the past 12 hour(s))   CBC WITH AUTOMATED DIFF    Collection Time: 04/27/18  8:33 AM   Result Value Ref Range    WBC 7.8 4.6 - 13.2 K/uL    RBC 4.98 4.70 - 5.50 M/uL    HGB 14.3 13.0 - 16.0 g/dL    HCT 41.2 36.0 - 48.0 %    MCV 82.7 74.0 - 97.0 FL    MCH 28.7 24.0 - 34.0 PG    MCHC 34.7 31.0 - 37.0 g/dL    RDW 16.4 (H) 11.6 - 14.5 %    PLATELET 323 (L) 985 - 420 K/uL    MPV 12.3 (H) 9.2 - 11.8 FL    NEUTROPHILS 70 40 - 73 %    LYMPHOCYTES 20 (L) 21 - 52 %    MONOCYTES 9 3 - 10 %    EOSINOPHILS 1 0 - 5 %    BASOPHILS 0 0 - 2 %    ABS. NEUTROPHILS 5.4 1.8 - 8.0 K/UL    ABS. LYMPHOCYTES 1.6 0.9 - 3.6 K/UL    ABS. MONOCYTES 0.7 0.05 - 1.2 K/UL    ABS. EOSINOPHILS 0.1 0.0 - 0.4 K/UL    ABS.  BASOPHILS 0.0 0.0 - 0.06 K/UL    DF AUTOMATED     METABOLIC PANEL, COMPREHENSIVE    Collection Time: 04/27/18  8:33 AM   Result Value Ref Range    Sodium 145 136 - 145 mmol/L    Potassium 3.7 3.5 - 5.5 mmol/L    Chloride 106 100 - 108 mmol/L    CO2 37 (H) 21 - 32 mmol/L    Anion gap 2 (L) 3.0 - 18 mmol/L    Glucose 122 (H) 74 - 99 mg/dL    BUN 13 7.0 - 18 MG/DL    Creatinine 1.03 0.6 - 1.3 MG/DL    BUN/Creatinine ratio 13 12 - 20      GFR est AA >60 >60 ml/min/1.73m2    GFR est non-AA >60 >60 ml/min/1.73m2    Calcium 10.1 8.5 - 10.1 MG/DL    Bilirubin, total 1.5 (H) 0.2 - 1.0 MG/DL    ALT (SGPT) 23 16 - 61 U/L    AST (SGOT) 11 (L) 15 - 37 U/L    Alk. phosphatase 90 45 - 117 U/L    Protein, total 7.8 6.4 - 8.2 g/dL    Albumin 3.8 3.4 - 5.0 g/dL    Globulin 4.0 2.0 - 4.0 g/dL    A-G Ratio 1.0 0.8 - 1.7     CARDIAC PANEL,(CK, CKMB & TROPONIN)    Collection Time: 04/27/18  8:33 AM   Result Value Ref Range    CK 71 39 - 308 U/L    CK - MB 1.4 <3.6 ng/ml    CK-MB Index 2.0 0.0 - 4.0 %    Troponin-I, Qt. <0.02 0.0 - 0.045 NG/ML   NT-PRO BNP    Collection Time: 04/27/18  8:33 AM   Result Value Ref Range    NT pro-BNP 5595 (H) 0 - 900 PG/ML   EKG, 12 LEAD, INITIAL    Collection Time: 04/27/18  8:48 AM   Result Value Ref Range    Ventricular Rate 109 BPM    Atrial Rate 326 BPM    QRS Duration 114 ms    Q-T Interval 372 ms    QTC Calculation (Bezet) 500 ms    Calculated R Axis 13 degrees    Calculated T Axis 69 degrees    Diagnosis       Atrial fibrillation with rapid ventricular response with premature   ventricular or aberrantly conducted complexes  Voltage criteria for left ventricular hypertrophy  Nonspecific ST and T wave abnormality  Abnormal ECG  When compared with ECG of 05-FEB-2018 19:09,  No significant change was found  Confirmed by Morris Chavarria ([id number]) on 4/27/2018 9:50:54 AM         Radiologic Studies -   XR CHEST SNGL V   IMPRESSION: Stable cardiomegaly without definite confluent consolidation. Medical Decision Making   I am the first provider for this patient. I reviewed the vital signs, available nursing notes, past medical history, past surgical history, family history and social history. Vital Signs-Reviewed the patient's vital signs.     Pulse Oximetry Analysis - 94% on room air, stable     Cardiac Monitor:  Rate: 88  Rhythm:  Normal Sinus Rhythm     EKG: Interpreted by the EP. Time Interpreted: 4528   Rate: 109   Rhythm: Atrial Fibrillation with RVR, normal axis, frequent PVC, non-specific S-T and T-wave changes without significant interval change from prior   Interpretation: Abnormal   Comparison: 2/2018    Records Reviewed: Nursing Notes and Old Medical Records (Time of Review: 8:49 AM)    ED Course: Progress Notes, Reevaluation, and Consults: Patient had steady improvement in his symptoms following diuresis and blood pressure control without hypoxemia or hemodynamic instability prior to discharge. I counseled the patient and his wife extensively on the prevention and treatment of CHF as well as importance of continuing his aspirin in face of recurrent atrial fibrillation. Precautions were given prior to discharge. Provider Notes (Medical Decision Making): Patient with admitted medication noncompliance and progressive signs of CHF. Will still evaluate for electrolyte abnormality, renal dysfunction, ACS, and significant effusions. No signs of DVT/PE, vascular emergency. No signs of hypertensive emergency but poorly controlled hypertension likely contributing to CHF. Will need to reassess after diuresis and blood pressure control. 2. Aspirin: Aspirin was given    Diagnosis     Clinical Impression:   1. Acute on chronic combined systolic and diastolic congestive heart failure ([address]    2.  Accelerated hypertension        Disposition: Discharge    Follow-up Information     Follow up With Details Comments Contact Info    [facility] EMERGENCY DEPT  As needed, If symptoms worsen [address]    Estella Neely MD In 3 days As needed, If symptoms are not resolving [facility]  [facility]  Jose Enrique juarez [address]  [phone]             Patient's Medications   Start Taking    No medications on file Continue Taking    ASPIRIN 81 MG CHEWABLE TABLET    Take 1 Tab by mouth daily. FUROSEMIDE (LASIX) 40 MG TABLET    Take 1 Tab by mouth daily. Or as directed    METHOCARBAMOL (ROBAXIN) 500 MG TABLET    Take 1 Tab by mouth four (4) times daily. NITROGLYCERIN (NITROSTAT) 0.4 MG SL TABLET    1 Tab by SubLINGual route every five (5) minutes as needed for Chest Pain. These Medications have changed    Modified Medication Previous Medication    CARVEDILOL (COREG) 25 MG TABLET carvedilol (COREG) 25 mg tablet       Take 1 Tab by mouth every twelve (12) hours. Indications: chronic heart failure    Take 1 Tab by mouth every twelve (12) hours. Indications: Chronic Heart Failure    LISINOPRIL (PRINIVIL, ZESTRIL) 20 MG TABLET lisinopril (PRINIVIL, ZESTRIL) 20 mg tablet       Take 1 Tab by mouth daily. Take 1 Tab by mouth daily. Stop Taking    No medications on file     _______________________________    Attestations:  Danielle [address] [place] [address] acting as a scribe for and in the presence of Reva Robles MD      April 27, 2018 at 8:49 AM       Provider Attestation:      I personally performed the services described in the documentation, reviewed the documentation, as recorded by the scribe in my presence, and it accurately and completely records my words and actions.  April 27, 2018 at 8:49 AM - Reva Robles MD    _______________________________

## 2018-04-27 NOTE — ED NOTES
Pt requesting O2 for comfort, O2 levels change from 83% to 100% on RA with adequate pleth wave, placed on 2L NC per Dr Kira Asif.

## 2018-04-27 NOTE — ED TRIAGE NOTES
Pt arrived via EMS c/o chest pain since this AM, hx of a-fib, a-fib on monitor/ekg, states pressure is midsternal, a&ox4, family member at bedside.

## 2018-04-27 NOTE — ED NOTES
Pt up in bathroom for episode of stool incontinence. Bedding and gown changed, chux pad placed on bed. Wife in bathroom assisting patient.

## 2018-06-04 RX ORDER — LISINOPRIL 20 MG/1
20 TABLET ORAL DAILY
Qty: 30 TAB | Refills: 6 | Status: SHIPPED | OUTPATIENT
Start: 2018-06-04 | End: 2018-07-09 | Stop reason: SDUPTHER

## 2018-07-09 RX ORDER — LISINOPRIL 20 MG/1
20 TABLET ORAL DAILY
Qty: 30 TAB | Refills: 6 | Status: ON HOLD | OUTPATIENT
Start: 2018-07-09 | End: 2018-10-29 | Stop reason: SDUPTHER

## 2018-07-20 RX ORDER — FUROSEMIDE 40 MG/1
40 TABLET ORAL DAILY
Qty: 30 TAB | Refills: 3 | Status: ON HOLD | OUTPATIENT
Start: 2018-07-20 | End: 2018-10-29 | Stop reason: SDUPTHER

## 2018-08-08 ENCOUNTER — OFFICE VISIT (OUTPATIENT)
Dept: CARDIOLOGY CLINIC | Age: 58
End: 2018-08-08

## 2018-08-08 VITALS
BODY MASS INDEX: 37.66 KG/M2 | HEART RATE: 61 BPM | SYSTOLIC BLOOD PRESSURE: 122 MMHG | OXYGEN SATURATION: 96 % | WEIGHT: 269 LBS | HEIGHT: 71 IN | DIASTOLIC BLOOD PRESSURE: 74 MMHG

## 2018-08-08 DIAGNOSIS — I50.42 CHRONIC COMBINED SYSTOLIC AND DIASTOLIC CONGESTIVE HEART FAILURE (HCC): Primary | ICD-10-CM

## 2018-08-08 DIAGNOSIS — I10 ESSENTIAL HYPERTENSION: ICD-10-CM

## 2018-08-08 DIAGNOSIS — Z72.0 TOBACCO USE: ICD-10-CM

## 2018-08-08 DIAGNOSIS — I48.19 PERSISTENT ATRIAL FIBRILLATION (HCC): ICD-10-CM

## 2018-08-08 NOTE — PROGRESS NOTES
HISTORY OF PRESENT ILLNESS  Jay Davidson is a 60 y.o. male. Shortness of Breath   Pertinent negatives include no fever, no headaches, no cough, no wheezing, no PND, no orthopnea, no chest pain, no vomiting, no abdominal pain, no rash, no leg swelling and no claudication. Patient presents for a follow-up office visit. He is previously seen by Dr. Wen Hall. He has a past medical history significant for medication noncompliance. He has chronic atrial fibrillation, a hypertensive cardiomyopathy due to poorly controlled high blood pressure and ongoing tobacco use. Patient was last hospitalized for several days at the beginning of February 2018 for hypertensive urgency and congestive heart failure. He was diuresed and started back on his blood pressure medications and his symptoms improved. He underwent an echocardiogram in February 2018 which showed a mildly depressed LV systolic function, EF 84-78% with moderate to severe LVH and severe left atrial enlargement, moderate mitral regurgitation and moderate pulmonary hypertension. RVSP 45 mmHg. Patient underwent a right and left heart catheterization in March 2018 which demonstrated mild nonobstructive coronary disease involving his left circumflex artery. He had normal left ventricular ileum pressures, LVEDP 12 mmHg. Normal right sided heart pressures, PCWP 15 mmHg, PA 32/13 with a mean of 18 mmHg. At that time he was found to be mildly hypotensive and was felt to be over diuresed. His Lasix dosage was decreased to 20 mg daily, but the patient increase it back to 40 mg on his own because he noted weight gain. Patient was last seen in the office approximately 4 months ago. Since that time he states his been feeling better. He denies any worsening shortness of breath. If anything his shortness of breath is better than it was at last visit.   He does complain of intermittent chest tightness/heaviness when he lays down at night but this usually resolve with position change. No leg swelling, orthopnea or PND. He states his weight is down compared to last visit. He has been trying to take all his medications as prescribed. Past Medical History:   Diagnosis Date    Atrial fibrillation ([address] 05/06/2015    Persistent    Diastolic HF (heart failure) (HCC)     EF 45-50% on echocardiogram 2/2018    History of echocardiogram 02/06/2018    LVE. EF 45-50%. No WMA. Mod-severe conc LVH. Indeterminate diastolic fx. RVSP at least 45 mmHg. Severe LAE. Mod ROSEMARIE. Mod MR. Mod TR. mild to moderate AI    Hypertension     Tobacco use      Current Outpatient Prescriptions   Medication Sig Dispense Refill    furosemide (LASIX) 40 mg tablet Take 1 Tab by mouth daily. Or as directed 30 Tab 3    lisinopril (PRINIVIL, ZESTRIL) 20 mg tablet Take 1 Tab by mouth daily. 30 Tab 6    carvedilol (COREG) 25 mg tablet Take 1 Tab by mouth every twelve (12) hours. Indications: chronic heart failure 60 Tab 2    aspirin 81 mg chewable tablet Take 1 Tab by mouth daily. 30 Tab 11    nitroglycerin (NITROSTAT) 0.4 mg SL tablet 1 Tab by SubLINGual route every five (5) minutes as needed for Chest Pain. 1 Bottle 1    methocarbamol (ROBAXIN) 500 mg tablet Take 1 Tab by mouth four (4) times daily. 30 Tab 0     No Known Allergies     Social History   Substance Use Topics    Smoking status: Current Every Day Smoker     Packs/day: 0.50     Years: 40.00    Smokeless tobacco: Never Used      Comment: trying to quit    Alcohol use No     Family History   Problem Relation Age of Onset    Heart Failure Mother     Obesity Mother     Diabetes Father          Review of Systems   Constitutional: Negative for chills, fever and weight loss. HENT: Negative for nosebleeds. Eyes: Negative for blurred vision and double vision. Respiratory: Positive for shortness of breath. Negative for cough and wheezing.     Cardiovascular: Negative for chest pain, palpitations, orthopnea, claudication, leg swelling and PND. Gastrointestinal: Negative for abdominal pain, heartburn, nausea and vomiting. Genitourinary: Negative for dysuria and hematuria. Musculoskeletal: Negative for falls and myalgias. Skin: Negative for rash. Neurological: Negative for dizziness, focal weakness and headaches. Endo/Heme/Allergies: Does not bruise/bleed easily. Psychiatric/Behavioral: Negative for substance abuse. Visit Vitals    /74    Pulse 61    Ht 5' 11\" (1.803 m)    Wt 122 kg (269 lb)    SpO2 96%    BMI 37.52 kg/m2       Physical Exam   Constitutional: He is oriented to person, place, and time. He appears well-developed and well-nourished. HENT:   Head: Normocephalic and atraumatic. Eyes: Conjunctivae are normal.   Neck: Neck supple. No JVD present. Carotid bruit is not present. Cardiovascular: Normal rate, S1 normal, S2 normal and normal pulses. An irregularly irregular rhythm present. Exam reveals no gallop and no S3.    Murmur heard. High-pitched blowing holosystolic murmur is present with a grade of 2/6  at the apex  Pulmonary/Chest: Breath sounds normal. He has no wheezes. He has no rales. Abdominal: Soft. Bowel sounds are normal. There is no tenderness. Musculoskeletal: He exhibits no edema. Neurological: He is alert and oriented to person, place, and time. Skin: Skin is warm and dry. EKG: Atrial fibrillation, controlled ventricular rate in the 60s, normal axis, voltage criteria for LVH, no ST or T-wave abnormality concerning for ischemia. No change compared to the previous EKG. ASSESSMENT and PLAN    Chronic systolic and diastolic heart failure. Patient's volume status appears stable on his current diuretic regimen. His blood pressure is now well controlled on his current dosage of carvedilol and lisinopril. He continues to have s NYHA class II symptomatology which appears to be his baseline.   I will continue his current medical regimen and current diuretic regimen. Mild nonobstructive coronary artery disease seen on recent cardiac catheterization in March 2018. Persistent atrial fibrillation. This has been present for at least 3 years if not longer. He was initially started on Xarelto for anticoagulation, however he could not afford this and then was switched to warfarin, however this was eventually stopped because he was not compliant with his INR checks. At this point I would continue to use aspirin for CVA prophylaxis. Essential hypertension. This has been historically poorly controlled  due to medication noncompliance. Now that he is taking all his medications blood pressure looks excellent. I will continue his current regimen. Hypertensive cardiomyopathy. Patient recently underwent an echocardiogram in February 2018 which showed a mildly depressed LV systolic function, EF 00-68% with moderate to severe concentric LVH. Tobacco use disorder. Patient continues to smoke half a pack of cigarettes a day. He was encouraged to quit smoking completely. Follow-up in 6 months, sooner if needed.

## 2018-08-08 NOTE — MR AVS SNAPSHOT
[address] [id number] [address] [address] [phone] Patient: Catarino Odom MRN: [id number] KBM:8/5/1908 Visit Information Date & Time Provider Department Dept. Phone Encounter #  
 8/8/2018 11:20 AM Noah Guido MD [facility] [place] [facility] [id number] Upcoming Health Maintenance Date Due Hepatitis C Screening 1960 DTaP/Tdap/Td series (1 - Tdap) 6/4/1980 FOBT Q 1 YEAR AGE 50-75 6/4/2009 MEDICARE YEARLY EXAM 3/14/2018 Influenza Age 5 to Adult 8/1/2018 Allergies as of 8/8/2018  Review Complete On: 4/27/2018 By: Aaliyah Zamarripa No Known Allergies Current Immunizations  Never Reviewed Name Date Influenza Vaccine (Quad) PF 2/7/2018 11:01 AM  
 Pneumococcal Polysaccharide (PPSV-23) 5/9/2015  1:45 PM  
  
 Not reviewed this visit You Were Diagnosed With   
  
 Codes Comments Chronic combined systolic and diastolic congestive heart failure (HCC)    -  Primary ICD-10-CM: I50.42 
ICD-9-CM: 428.42, 428.0 Essential hypertension     ICD-10-CM: I10 
ICD-9-CM: 401.9 Shortness of breath     ICD-10-CM: R06.02 
ICD-9-CM: 786.05 Persistent atrial fibrillation (HCC)     ICD-10-CM: I48.1 ICD-9-CM: 427.31 Vitals BP Pulse Height(growth percentile) Weight(growth percentile) SpO2 BMI  
 122/74 61 5' 11\" (1.803 m) 269 lb (122 kg) 96% 37.52 kg/m2 Smoking Status Current Every Day Smoker Vitals History BMI and BSA Data Body Mass Index Body Surface Area  
 37.52 kg/m 2 2.47 m 2 Preferred Pharmacy Pharmacy Name Phone [address] [place] [address]. [phone] Your Updated Medication List  
  
   
This list is accurate as of 8/8/18 12:01 PM.  Always use your most recent med list.  
  
  
  
  
 aspirin 81 mg chewable tablet Take 1 Tab by mouth daily. carvedilol 25 mg tablet Commonly known as:  Jinx Re Take 1 Tab by mouth every twelve (12) hours. Indications: chronic heart failure  
  
 furosemide 40 mg tablet Commonly known as:  LASIX Take 1 Tab by mouth daily. Or as directed  
  
 lisinopril 20 mg tablet Commonly known as:  Nuvia Lights Take 1 Tab by mouth daily. methocarbamol 500 mg tablet Commonly known as:  ROBAXIN Take 1 Tab by mouth four (4) times daily. nitroglycerin 0.4 mg SL tablet Commonly known as:  NITROSTAT  
1 Tab by SubLINGual route every five (5) minutes as needed for Chest Pain. We Performed the Following AMB POC EKG ROUTINE W/ 12 LEADS, INTER & REP [61588 CPT(R)] Introducing [facility]! [facility] introduces [employer] patient portal. Now you can access parts of your medical record, email your doctor's office, and request medication refills online. 1. In your internet browser, go to [email or web address] 2. Click on the First Time User? Click Here link in the Sign In box. You will see the New Member Sign Up page. 3. Enter your [employer] Access Code exactly as it appears below. You will not need to use this code after youve completed the sign-up process. If you do not sign up before the expiration date, you must request a new code. · [employer] Access Code: [email or web address] Expires: 8/15/2018  2:20 PM 
 
4. Enter the last four digits of your Social Security Number (xxxx) and Date of Birth (mm/dd/yyyy) as indicated and click Submit. You will be taken to the next sign-up page. 5. Create a SMARTt ID. This will be your [employer] login ID and cannot be changed, so think of one that is secure and easy to remember. 6. Create a [employer] password. You can change your password at any time. 7. Enter your Password Reset Question and Answer. This can be used at a later time if you forget your password. 8. Enter your e-mail address. You will receive e-mail notification when new information is available in [address] 9. Click Sign Up. You can now view and download portions of your medical record. 10. Click the Download Summary menu link to download a portable copy of your medical information. If you have questions, please visit the Frequently Asked Questions section of the [employer] website. Remember, [employer] is NOT to be used for urgent needs. For medical emergencies, dial 911. Now available from your iPhone and Android! Please provide this summary of care documentation to your next provider. Your primary care clinician is listed as Shane Leon. If you have any questions after today's visit, please call [phone].

## 2018-09-10 ENCOUNTER — HOSPITAL ENCOUNTER (OUTPATIENT)
Dept: ULTRASOUND IMAGING | Age: 58
Discharge: HOME | End: 2018-09-10
Attending: NURSE PRACTITIONER
Payer: MEDICARE

## 2018-09-10 DIAGNOSIS — R19.09 UMBILICAL MASS: ICD-10-CM

## 2018-09-10 PROCEDURE — 76705 ECHO EXAM OF ABDOMEN: CPT

## 2018-10-26 ENCOUNTER — APPOINTMENT (OUTPATIENT)
Dept: MRI IMAGING | Age: 58
DRG: 065 | End: 2018-10-26
Attending: EMERGENCY MEDICINE
Payer: MEDICARE

## 2018-10-26 ENCOUNTER — APPOINTMENT (OUTPATIENT)
Dept: CT IMAGING | Age: 58
DRG: 065 | End: 2018-10-26
Attending: EMERGENCY MEDICINE
Payer: MEDICARE

## 2018-10-26 ENCOUNTER — APPOINTMENT (OUTPATIENT)
Dept: GENERAL RADIOLOGY | Age: 58
DRG: 065 | End: 2018-10-26
Attending: EMERGENCY MEDICINE
Payer: MEDICARE

## 2018-10-26 ENCOUNTER — HOSPITAL ENCOUNTER (INPATIENT)
Age: 58
LOS: 2 days | Discharge: HOME | DRG: 065 | End: 2018-10-29
Attending: EMERGENCY MEDICINE | Admitting: HOSPITALIST
Payer: MEDICARE

## 2018-10-26 DIAGNOSIS — R51.9 ACUTE NONINTRACTABLE HEADACHE, UNSPECIFIED HEADACHE TYPE: Primary | ICD-10-CM

## 2018-10-26 DIAGNOSIS — R06.02 SOB (SHORTNESS OF BREATH): ICD-10-CM

## 2018-10-26 DIAGNOSIS — R09.89 SUSPECTED CEREBROVASCULAR ACCIDENT (CVA): ICD-10-CM

## 2018-10-26 DIAGNOSIS — I50.9 CONGESTIVE HEART FAILURE, UNSPECIFIED HF CHRONICITY, UNSPECIFIED HEART FAILURE TYPE (HCC): ICD-10-CM

## 2018-10-26 LAB
ALBUMIN SERPL-MCNC: 4 G/DL (ref 3.4–5)
ALBUMIN/GLOB SERPL: 1 {RATIO} (ref 0.8–1.7)
ALP SERPL-CCNC: 84 U/L (ref 45–117)
ALT SERPL-CCNC: 20 U/L (ref 16–61)
ANION GAP SERPL CALC-SCNC: 8 MMOL/L (ref 3–18)
AST SERPL-CCNC: 7 U/L (ref 15–37)
BASOPHILS # BLD: 0 K/UL (ref 0–0.1)
BASOPHILS NFR BLD: 0 % (ref 0–2)
BILIRUB SERPL-MCNC: 0.9 MG/DL (ref 0.2–1)
BUN SERPL-MCNC: 11 MG/DL (ref 7–18)
BUN/CREAT SERPL: 9 (ref 12–20)
CALCIUM SERPL-MCNC: 9.7 MG/DL (ref 8.5–10.1)
CHLORIDE SERPL-SCNC: 107 MMOL/L (ref 100–108)
CK MB CFR SERPL CALC: 1.7 % (ref 0–4)
CK MB SERPL-MCNC: 1.6 NG/ML (ref 5–25)
CK SERPL-CCNC: 93 U/L (ref 39–308)
CO2 SERPL-SCNC: 29 MMOL/L (ref 21–32)
CREAT SERPL-MCNC: 1.19 MG/DL (ref 0.6–1.3)
DIFFERENTIAL METHOD BLD: ABNORMAL
EOSINOPHIL # BLD: 0.1 K/UL (ref 0–0.4)
EOSINOPHIL NFR BLD: 2 % (ref 0–5)
ERYTHROCYTE [DISTWIDTH] IN BLOOD BY AUTOMATED COUNT: 15.5 % (ref 11.6–14.5)
GLOBULIN SER CALC-MCNC: 3.9 G/DL (ref 2–4)
GLUCOSE SERPL-MCNC: 100 MG/DL (ref 74–99)
HCT VFR BLD AUTO: 42.1 % (ref 36–48)
HGB BLD-MCNC: 14.6 G/DL (ref 13–16)
INR PPP: 1.1 (ref 0.8–1.2)
LACTATE BLD-SCNC: 2 MMOL/L (ref 0.4–2)
LYMPHOCYTES # BLD: 2.5 K/UL (ref 0.9–3.6)
LYMPHOCYTES NFR BLD: 37 % (ref 21–52)
MCH RBC QN AUTO: 29.6 PG (ref 24–34)
MCHC RBC AUTO-ENTMCNC: 34.7 G/DL (ref 31–37)
MCV RBC AUTO: 85.4 FL (ref 74–97)
MONOCYTES # BLD: 0.5 K/UL (ref 0.05–1.2)
MONOCYTES NFR BLD: 7 % (ref 3–10)
NEUTS SEG # BLD: 3.6 K/UL (ref 1.8–8)
NEUTS SEG NFR BLD: 54 % (ref 40–73)
PLATELET # BLD AUTO: 115 K/UL (ref 135–420)
PLATELET COMMENTS,PCOM: ABNORMAL
POTASSIUM SERPL-SCNC: 3.7 MMOL/L (ref 3.5–5.5)
PROT SERPL-MCNC: 7.9 G/DL (ref 6.4–8.2)
PROTHROMBIN TIME: 13.7 SEC (ref 11.5–15.2)
RBC # BLD AUTO: 4.93 M/UL (ref 4.7–5.5)
SODIUM SERPL-SCNC: 144 MMOL/L (ref 136–145)
TROPONIN I SERPL-MCNC: 0.04 NG/ML (ref 0–0.04)
WBC # BLD AUTO: 6.7 K/UL (ref 4.6–13.2)

## 2018-10-26 PROCEDURE — 93005 ELECTROCARDIOGRAM TRACING: CPT

## 2018-10-26 PROCEDURE — 83036 HEMOGLOBIN GLYCOSYLATED A1C: CPT | Performed by: HOSPITALIST

## 2018-10-26 PROCEDURE — 70450 CT HEAD/BRAIN W/O DYE: CPT

## 2018-10-26 PROCEDURE — 70553 MRI BRAIN STEM W/O & W/DYE: CPT

## 2018-10-26 PROCEDURE — 82550 ASSAY OF CK (CPK): CPT | Performed by: EMERGENCY MEDICINE

## 2018-10-26 PROCEDURE — 74011250636 HC RX REV CODE- 250/636: Performed by: EMERGENCY MEDICINE

## 2018-10-26 PROCEDURE — 99285 EMERGENCY DEPT VISIT HI MDM: CPT

## 2018-10-26 PROCEDURE — 96375 TX/PRO/DX INJ NEW DRUG ADDON: CPT

## 2018-10-26 PROCEDURE — 74011250637 HC RX REV CODE- 250/637: Performed by: EMERGENCY MEDICINE

## 2018-10-26 PROCEDURE — 80053 COMPREHEN METABOLIC PANEL: CPT | Performed by: EMERGENCY MEDICINE

## 2018-10-26 PROCEDURE — 96366 THER/PROPH/DIAG IV INF ADDON: CPT

## 2018-10-26 PROCEDURE — 71045 X-RAY EXAM CHEST 1 VIEW: CPT

## 2018-10-26 PROCEDURE — 85610 PROTHROMBIN TIME: CPT | Performed by: EMERGENCY MEDICINE

## 2018-10-26 PROCEDURE — 87040 BLOOD CULTURE FOR BACTERIA: CPT | Performed by: EMERGENCY MEDICINE

## 2018-10-26 PROCEDURE — 96365 THER/PROPH/DIAG IV INF INIT: CPT

## 2018-10-26 PROCEDURE — 74011000250 HC RX REV CODE- 250: Performed by: EMERGENCY MEDICINE

## 2018-10-26 PROCEDURE — 85025 COMPLETE CBC W/AUTO DIFF WBC: CPT | Performed by: EMERGENCY MEDICINE

## 2018-10-26 PROCEDURE — 94762 N-INVAS EAR/PLS OXIMTRY CONT: CPT

## 2018-10-26 PROCEDURE — 83605 ASSAY OF LACTIC ACID: CPT

## 2018-10-26 RX ORDER — ACETAMINOPHEN 325 MG/1
650 TABLET ORAL
Status: COMPLETED | OUTPATIENT
Start: 2018-10-26 | End: 2018-10-26

## 2018-10-26 RX ORDER — VANCOMYCIN 2 GRAM/500 ML IN 0.9 % SODIUM CHLORIDE INTRAVENOUS
2000 ONCE
Status: DISCONTINUED | OUTPATIENT
Start: 2018-10-26 | End: 2018-10-26 | Stop reason: DRUGHIGH

## 2018-10-26 RX ORDER — ASPIRIN 325 MG
325 TABLET ORAL
Status: COMPLETED | OUTPATIENT
Start: 2018-10-26 | End: 2018-10-26

## 2018-10-26 RX ORDER — SODIUM CHLORIDE 0.9 % (FLUSH) 0.9 %
5-10 SYRINGE (ML) INJECTION AS NEEDED
Status: DISCONTINUED | OUTPATIENT
Start: 2018-10-26 | End: 2018-10-29 | Stop reason: HOSPADM

## 2018-10-26 RX ORDER — NITROGLYCERIN 40 MG/100ML
0-200 INJECTION INTRAVENOUS
Status: DISCONTINUED | OUTPATIENT
Start: 2018-10-26 | End: 2018-10-27

## 2018-10-26 RX ORDER — FUROSEMIDE 10 MG/ML
60 INJECTION INTRAMUSCULAR; INTRAVENOUS
Status: COMPLETED | OUTPATIENT
Start: 2018-10-26 | End: 2018-10-26

## 2018-10-26 RX ADMIN — ACETAMINOPHEN 650 MG: 325 TABLET ORAL at 20:49

## 2018-10-26 RX ADMIN — FUROSEMIDE 60 MG: 10 INJECTION, SOLUTION INTRAMUSCULAR; INTRAVENOUS at 20:50

## 2018-10-26 RX ADMIN — ASPIRIN 325 MG ORAL TABLET 325 MG: 325 PILL ORAL at 22:46

## 2018-10-26 RX ADMIN — NITROGLYCERIN 20 MCG/MIN: 40 INJECTION INTRAVENOUS at 20:54

## 2018-10-26 RX ADMIN — CEFTRIAXONE SODIUM 2 G: 2 INJECTION, POWDER, FOR SOLUTION INTRAMUSCULAR; INTRAVENOUS at 22:44

## 2018-10-26 NOTE — ED TRIAGE NOTES
Pt states his head hurts and he is short winded. States he took one of his pressure pills and he took ibuprofen about an hour ago

## 2018-10-27 PROBLEM — R51.9 HEADACHE: Status: ACTIVE | Noted: 2018-10-27

## 2018-10-27 PROBLEM — G45.9 TIA (TRANSIENT ISCHEMIC ATTACK): Status: ACTIVE | Noted: 2018-10-27

## 2018-10-27 LAB
AMPHET UR QL SCN: NEGATIVE
ANION GAP SERPL CALC-SCNC: 9 MMOL/L (ref 3–18)
ATRIAL RATE: 357 BPM
BARBITURATES UR QL SCN: NEGATIVE
BASOPHILS # BLD: 0 K/UL (ref 0–0.1)
BASOPHILS NFR BLD: 0 % (ref 0–2)
BENZODIAZ UR QL: NEGATIVE
BUN SERPL-MCNC: 10 MG/DL (ref 7–18)
BUN/CREAT SERPL: 9 (ref 12–20)
CALCIUM SERPL-MCNC: 9.6 MG/DL (ref 8.5–10.1)
CALCULATED R AXIS, ECG10: 13 DEGREES
CALCULATED T AXIS, ECG11: 56 DEGREES
CANNABINOIDS UR QL SCN: POSITIVE
CHLORIDE SERPL-SCNC: 108 MMOL/L (ref 100–108)
CO2 SERPL-SCNC: 28 MMOL/L (ref 21–32)
COCAINE UR QL SCN: NEGATIVE
CREAT SERPL-MCNC: 1.06 MG/DL (ref 0.6–1.3)
DIAGNOSIS, 93000: NORMAL
DIFFERENTIAL METHOD BLD: ABNORMAL
EOSINOPHIL # BLD: 0.2 K/UL (ref 0–0.4)
EOSINOPHIL NFR BLD: 3 % (ref 0–5)
ERYTHROCYTE [DISTWIDTH] IN BLOOD BY AUTOMATED COUNT: 15.6 % (ref 11.6–14.5)
EST. AVERAGE GLUCOSE BLD GHB EST-MCNC: 117 MG/DL
GLUCOSE BLD STRIP.AUTO-MCNC: 101 MG/DL (ref 70–110)
GLUCOSE BLD STRIP.AUTO-MCNC: 105 MG/DL (ref 70–110)
GLUCOSE BLD STRIP.AUTO-MCNC: 150 MG/DL (ref 70–110)
GLUCOSE BLD STRIP.AUTO-MCNC: 91 MG/DL (ref 70–110)
GLUCOSE BLD STRIP.AUTO-MCNC: 95 MG/DL (ref 70–110)
GLUCOSE SERPL-MCNC: 92 MG/DL (ref 74–99)
HBA1C MFR BLD: 5.7 % (ref 4.2–5.6)
HCT VFR BLD AUTO: 42.3 % (ref 36–48)
HDSCOM,HDSCOM: ABNORMAL
HGB BLD-MCNC: 14.3 G/DL (ref 13–16)
LYMPHOCYTES # BLD: 2.4 K/UL (ref 0.9–3.6)
LYMPHOCYTES NFR BLD: 35 % (ref 21–52)
MCH RBC QN AUTO: 28.9 PG (ref 24–34)
MCHC RBC AUTO-ENTMCNC: 33.8 G/DL (ref 31–37)
MCV RBC AUTO: 85.5 FL (ref 74–97)
METHADONE UR QL: NEGATIVE
MONOCYTES # BLD: 0.7 K/UL (ref 0.05–1.2)
MONOCYTES NFR BLD: 10 % (ref 3–10)
NEUTS SEG # BLD: 3.6 K/UL (ref 1.8–8)
NEUTS SEG NFR BLD: 52 % (ref 40–73)
OPIATES UR QL: NEGATIVE
PCP UR QL: NEGATIVE
PLATELET # BLD AUTO: 105 K/UL (ref 135–420)
POTASSIUM SERPL-SCNC: 3.6 MMOL/L (ref 3.5–5.5)
Q-T INTERVAL, ECG07: 416 MS
QRS DURATION, ECG06: 108 MS
QTC CALCULATION (BEZET), ECG08: 432 MS
RBC # BLD AUTO: 4.95 M/UL (ref 4.7–5.5)
SODIUM SERPL-SCNC: 145 MMOL/L (ref 136–145)
VENTRICULAR RATE, ECG03: 65 BPM
WBC # BLD AUTO: 6.8 K/UL (ref 4.6–13.2)

## 2018-10-27 PROCEDURE — 85025 COMPLETE CBC W/AUTO DIFF WBC: CPT | Performed by: HOSPITALIST

## 2018-10-27 PROCEDURE — 82962 GLUCOSE BLOOD TEST: CPT

## 2018-10-27 PROCEDURE — 90471 IMMUNIZATION ADMIN: CPT

## 2018-10-27 PROCEDURE — 92610 EVALUATE SWALLOWING FUNCTION: CPT

## 2018-10-27 PROCEDURE — 74011636320 HC RX REV CODE- 636/320: Performed by: EMERGENCY MEDICINE

## 2018-10-27 PROCEDURE — 74011250636 HC RX REV CODE- 250/636: Performed by: EMERGENCY MEDICINE

## 2018-10-27 PROCEDURE — 74011250637 HC RX REV CODE- 250/637: Performed by: HOSPITALIST

## 2018-10-27 PROCEDURE — 36415 COLL VENOUS BLD VENIPUNCTURE: CPT | Performed by: HOSPITALIST

## 2018-10-27 PROCEDURE — 80307 DRUG TEST PRSMV CHEM ANLYZR: CPT | Performed by: HOSPITALIST

## 2018-10-27 PROCEDURE — 90686 IIV4 VACC NO PRSV 0.5 ML IM: CPT | Performed by: HOSPITALIST

## 2018-10-27 PROCEDURE — A9575 INJ GADOTERATE MEGLUMI 0.1ML: HCPCS | Performed by: EMERGENCY MEDICINE

## 2018-10-27 PROCEDURE — 74011250636 HC RX REV CODE- 250/636: Performed by: HOSPITALIST

## 2018-10-27 PROCEDURE — 96368 THER/DIAG CONCURRENT INF: CPT

## 2018-10-27 PROCEDURE — 74011636637 HC RX REV CODE- 636/637: Performed by: HOSPITALIST

## 2018-10-27 PROCEDURE — 80048 BASIC METABOLIC PNL TOTAL CA: CPT | Performed by: HOSPITALIST

## 2018-10-27 PROCEDURE — 65660000000 HC RM CCU STEPDOWN

## 2018-10-27 RX ORDER — CARVEDILOL 25 MG/1
25 TABLET ORAL EVERY 12 HOURS
Status: DISCONTINUED | OUTPATIENT
Start: 2018-10-27 | End: 2018-10-27

## 2018-10-27 RX ORDER — GUAIFENESIN 100 MG/5ML
81 LIQUID (ML) ORAL DAILY
Status: DISCONTINUED | OUTPATIENT
Start: 2018-10-27 | End: 2018-10-29 | Stop reason: HOSPADM

## 2018-10-27 RX ORDER — ONDANSETRON 2 MG/ML
4 INJECTION INTRAMUSCULAR; INTRAVENOUS
Status: DISCONTINUED | OUTPATIENT
Start: 2018-10-27 | End: 2018-10-29 | Stop reason: HOSPADM

## 2018-10-27 RX ORDER — INSULIN LISPRO 100 [IU]/ML
INJECTION, SOLUTION INTRAVENOUS; SUBCUTANEOUS
Status: DISCONTINUED | OUTPATIENT
Start: 2018-10-27 | End: 2018-10-29 | Stop reason: HOSPADM

## 2018-10-27 RX ORDER — ACETAMINOPHEN 325 MG/1
650 TABLET ORAL
Status: DISCONTINUED | OUTPATIENT
Start: 2018-10-27 | End: 2018-10-29 | Stop reason: HOSPADM

## 2018-10-27 RX ORDER — HEPARIN SODIUM 5000 [USP'U]/ML
5000 INJECTION, SOLUTION INTRAVENOUS; SUBCUTANEOUS EVERY 8 HOURS
Status: DISCONTINUED | OUTPATIENT
Start: 2018-10-27 | End: 2018-10-27

## 2018-10-27 RX ORDER — DEXTROSE 50 % IN WATER (D50W) INTRAVENOUS SYRINGE
25-50 AS NEEDED
Status: DISCONTINUED | OUTPATIENT
Start: 2018-10-27 | End: 2018-10-29 | Stop reason: HOSPADM

## 2018-10-27 RX ORDER — INSULIN LISPRO 100 [IU]/ML
INJECTION, SOLUTION INTRAVENOUS; SUBCUTANEOUS
Status: DISCONTINUED | OUTPATIENT
Start: 2018-10-27 | End: 2018-10-27

## 2018-10-27 RX ORDER — VANCOMYCIN 1.75 GRAM/500 ML IN 0.9 % SODIUM CHLORIDE INTRAVENOUS
1750
Status: DISCONTINUED | OUTPATIENT
Start: 2018-10-27 | End: 2018-10-27

## 2018-10-27 RX ORDER — SODIUM CHLORIDE 0.9 % (FLUSH) 0.9 %
5-10 SYRINGE (ML) INJECTION AS NEEDED
Status: DISCONTINUED | OUTPATIENT
Start: 2018-10-27 | End: 2018-10-29 | Stop reason: HOSPADM

## 2018-10-27 RX ORDER — NITROGLYCERIN 0.4 MG/1
0.4 TABLET SUBLINGUAL
Status: DISCONTINUED | OUTPATIENT
Start: 2018-10-27 | End: 2018-10-29 | Stop reason: HOSPADM

## 2018-10-27 RX ORDER — CARVEDILOL 6.25 MG/1
6.25 TABLET ORAL EVERY 12 HOURS
Status: DISCONTINUED | OUTPATIENT
Start: 2018-10-27 | End: 2018-10-28

## 2018-10-27 RX ORDER — LISINOPRIL 20 MG/1
20 TABLET ORAL DAILY
Status: DISCONTINUED | OUTPATIENT
Start: 2018-10-27 | End: 2018-10-27

## 2018-10-27 RX ORDER — FUROSEMIDE 40 MG/1
40 TABLET ORAL ONCE
Status: COMPLETED | OUTPATIENT
Start: 2018-10-27 | End: 2018-10-27

## 2018-10-27 RX ORDER — FUROSEMIDE 40 MG/1
40 TABLET ORAL DAILY
Status: DISCONTINUED | OUTPATIENT
Start: 2018-10-27 | End: 2018-10-29 | Stop reason: HOSPADM

## 2018-10-27 RX ORDER — SODIUM CHLORIDE 0.9 % (FLUSH) 0.9 %
5-10 SYRINGE (ML) INJECTION EVERY 8 HOURS
Status: DISCONTINUED | OUTPATIENT
Start: 2018-10-27 | End: 2018-10-29 | Stop reason: HOSPADM

## 2018-10-27 RX ORDER — MAGNESIUM SULFATE 100 %
4 CRYSTALS MISCELLANEOUS AS NEEDED
Status: DISCONTINUED | OUTPATIENT
Start: 2018-10-27 | End: 2018-10-29 | Stop reason: HOSPADM

## 2018-10-27 RX ORDER — SODIUM CHLORIDE 9 MG/ML
75 INJECTION, SOLUTION INTRAVENOUS CONTINUOUS
Status: DISCONTINUED | OUTPATIENT
Start: 2018-10-27 | End: 2018-10-27

## 2018-10-27 RX ORDER — ATORVASTATIN CALCIUM 40 MG/1
40 TABLET, FILM COATED ORAL
Status: DISCONTINUED | OUTPATIENT
Start: 2018-10-27 | End: 2018-10-29 | Stop reason: HOSPADM

## 2018-10-27 RX ADMIN — SODIUM CHLORIDE 75 ML/HR: 900 INJECTION, SOLUTION INTRAVENOUS at 04:51

## 2018-10-27 RX ADMIN — CARVEDILOL 6.25 MG: 6.25 TABLET, FILM COATED ORAL at 21:50

## 2018-10-27 RX ADMIN — INSULIN LISPRO 2 UNITS: 100 INJECTION, SOLUTION INTRAVENOUS; SUBCUTANEOUS at 12:24

## 2018-10-27 RX ADMIN — ACETAMINOPHEN 650 MG: 325 TABLET ORAL at 12:17

## 2018-10-27 RX ADMIN — HEPARIN SODIUM 5000 UNITS: 5000 INJECTION, SOLUTION INTRAVENOUS; SUBCUTANEOUS at 04:49

## 2018-10-27 RX ADMIN — INFLUENZA VIRUS VACCINE 0.5 ML: 15; 15; 15; 15 SUSPENSION INTRAMUSCULAR at 17:30

## 2018-10-27 RX ADMIN — ACYCLOVIR SODIUM 972 MG: 50 INJECTION, SOLUTION INTRAVENOUS at 00:52

## 2018-10-27 RX ADMIN — GADOTERATE MEGLUMINE 20 ML: 376.9 INJECTION INTRAVENOUS at 00:27

## 2018-10-27 RX ADMIN — ATORVASTATIN CALCIUM 40 MG: 40 TABLET, FILM COATED ORAL at 21:50

## 2018-10-27 RX ADMIN — ATORVASTATIN CALCIUM 40 MG: 40 TABLET, FILM COATED ORAL at 04:50

## 2018-10-27 RX ADMIN — FUROSEMIDE 40 MG: 40 TABLET ORAL at 08:09

## 2018-10-27 RX ADMIN — VANCOMYCIN HYDROCHLORIDE 2500 MG: 10 INJECTION, POWDER, LYOPHILIZED, FOR SOLUTION INTRAVENOUS at 02:29

## 2018-10-27 RX ADMIN — Medication 10 ML: at 21:51

## 2018-10-27 RX ADMIN — Medication 10 ML: at 16:33

## 2018-10-27 RX ADMIN — FUROSEMIDE 40 MG: 40 TABLET ORAL at 16:32

## 2018-10-27 RX ADMIN — ACETAMINOPHEN 650 MG: 325 TABLET ORAL at 18:24

## 2018-10-27 NOTE — PROGRESS NOTES
Pt was admitted this am, seen for follow up Pt feels ok, still have R side headache but better then yesterday. Denies any weakness or numbness or slurred speech Denies any abd pain, no N/V. Passing flatus and had BM day before. Denies any abd distention No fevers or chills, no neck pain or stiffness Exam: neuro AAOx3, motor 5/5 all ext, no cranial nerve defects. MRI noted: embolic CVA Know A fib and not on anticoagulation Cont asa and statin Doubt meningitis, will hold off LP for now D/w neuro Dr. Lion Vivas, agree with holding LP, he will eval and d/c Abx if no signs of meningitis. Hx non compliance D/w cardio Ronnell KEARNS [facility]. D/w pt and wife [phone] in detail, explained CVA and risk from A fib. Addendum: D/w neuro, doesn't think its meningitis, will d/c LP and Abx. RS: basal rales on R  
C/o exertional SOB Will d/c IVF and give extra dose of lasix this afternoon Addendum; 
Platelets 404, will hold heparin SQ and start SCD's

## 2018-10-27 NOTE — ED PROVIDER NOTES
EMERGENCY DEPARTMENT HISTORY AND PHYSICAL EXAM 
 
8:01 PM 
 
 
Date: 10/26/2018 Patient Name: Chuck Jasso History of Presenting Illness Chief Complaint Patient presents with  
 Headache  Shortness of Breath History Provided By: Patient and Patient's Son Chief Complaint: Headache Duration:  \"all day\" Timing:  Constant Location: head Quality: Aching Severity: Moderate Modifying Factors: None Associated Symptoms: SOB, abdominal distention, and right sided chest pain Additional History (Context): Chuck Jasso is a 60 y.o. male with PMHx of HTN, Afib, and diastolic heart failure who presents with c/o moderate constant aching headache that started \"today\" with associated Sx of SOB, abdominal distention, and right sided chest pain. Pt's son states the patient drinks a lot more fluids than he should because of his CHF and she drinks large teas all day. Pt's son also noticed abdominal distention that has worsened over the past week. Per pt's son, he still uses tobacco and he didn't take his evening medications yet. Pt denies leg swelling and eye pain. Denies any further complaints or symptoms at the moment. PCP: Nissa Molina MD 
 
Current Facility-Administered Medications Medication Dose Route Frequency Provider Last Rate Last Dose  nitroglycerin (TRIDIL) 400 mcg/ml infusion  0-200 mcg/min IntraVENous TITRATE Araseli Guido MD   Stopped at 10/27/18 5055  sodium chloride (NS) flush 5-10 mL  5-10 mL IntraVENous PRN Elyssa Valverde MD      
 vancomycin (VANCOCIN) 2,500 mg in 0.9% sodium chloride 500 mL IVPB  2,500 mg IntraVENous ONCE Elyssa Valverde  mL/hr at 10/27/18 0229 2,500 mg at 10/27/18 0229 Current Outpatient Medications Medication Sig Dispense Refill  furosemide (LASIX) 40 mg tablet Take 1 Tab by mouth daily. Or as directed 30 Tab 3  
 lisinopril (PRINIVIL, ZESTRIL) 20 mg tablet Take 1 Tab by mouth daily. 30 Tab 6  carvedilol (COREG) 25 mg tablet Take 1 Tab by mouth every twelve (12) hours. Indications: chronic heart failure 60 Tab 2  
 aspirin 81 mg chewable tablet Take 1 Tab by mouth daily. 30 Tab 11  
 nitroglycerin (NITROSTAT) 0.4 mg SL tablet 1 Tab by SubLINGual route every five (5) minutes as needed for Chest Pain. 1 Bottle 1  
 methocarbamol (ROBAXIN) 500 mg tablet Take 1 Tab by mouth four (4) times daily. 30 Tab 0 Past History Past Medical History: 
Past Medical History:  
Diagnosis Date  Atrial fibrillation ([address] 05/06/2015 Persistent  Diastolic HF (heart failure) ([facility] [address] EF 45-50% on echocardiogram 2/2018  History of echocardiogram 02/06/2018 LVE. EF 45-50%. No WMA. Mod-severe conc LVH. Indeterminate diastolic fx. RVSP at least 45 mmHg. Severe LAE. Mod ROSEMARIE. Mod MR. Mod TR. mild to moderate AI  
 Hypertension  Tobacco use Past Surgical History: 
Past Surgical History:  
Procedure Laterality Date  HX HERNIA REPAIR    
 HX HIP REPLACEMENT Bilateral   
 right in March 2013, left May 2013 Family History: 
Family History Problem Relation Age of Onset  Heart Failure Mother  Obesity Mother  Diabetes Father Social History: 
Social History Tobacco Use  Smoking status: Current Every Day Smoker Packs/day: 0.50 Years: 40.00 Pack years: 20.00  Smokeless tobacco: Never Used  Tobacco comment: trying to quit Substance Use Topics  Alcohol use: No  
 Drug use: Yes Types: Marijuana Comment: daily Allergies: 
No Known Allergies Review of Systems Review of Systems Constitutional: Negative for activity change and appetite change. HENT: Negative for congestion. Eyes: Negative for pain and visual disturbance. Respiratory: Positive for shortness of breath. Negative for cough. Cardiovascular: Positive for chest pain (right sided). Negative for leg swelling. Gastrointestinal: Positive for abdominal distention. Negative for abdominal pain, diarrhea, nausea and vomiting. Genitourinary: Negative for dysuria. Musculoskeletal: Negative for arthralgias and myalgias. Skin: Negative for rash. Neurological: Positive for headaches. Negative for weakness and numbness. Physical Exam  
 
Visit Vitals /70 Pulse 65 Temp 97.4 °F (36.3 °C) Resp 15 Ht 5' 10.98\" (1.803 m) Comment: previous encounter Wt 130 kg (286 lb 9.6 oz) SpO2 98% BMI 39.99 kg/m² Physical Exam  
Constitutional: He is oriented to person, place, and time. He appears well-developed and well-nourished. Patient is mildly confused HENT:  
Head: Normocephalic and atraumatic. Mouth/Throat: Oropharynx is clear and moist.  
Eyes: Conjunctivae are normal.  
Neck: Normal range of motion. Neck supple. No JVD present. Cardiovascular: Normal rate, regular rhythm, normal heart sounds and intact distal pulses. No murmur heard. Pulmonary/Chest: Effort normal and breath sounds normal. Tachypnea noted. Abdominal: Soft. Bowel sounds are normal. He exhibits no distension. There is no tenderness. Musculoskeletal: Normal range of motion. He exhibits no deformity. Symmetric lower extremity edema Lymphadenopathy:  
  He has no cervical adenopathy. Neurological: He is alert and oriented to person, place, and time. He has normal strength. No cranial nerve deficit or sensory deficit. Coordination normal.  
Skin: Skin is warm and dry. No rash noted. Psychiatric: He has a normal mood and affect. Nursing note and vitals reviewed. Diagnostic Study Results Labs - Recent Results (from the past 12 hour(s)) EKG, 12 LEAD, INITIAL Collection Time: 10/26/18  7:39 PM  
Result Value Ref Range Ventricular Rate 65 BPM  
 Atrial Rate 357 BPM  
 QRS Duration 108 ms Q-T Interval 416 ms  
 QTC Calculation (Bezet) 432 ms Calculated R Axis 13 degrees Calculated T Axis 56 degrees Diagnosis Atrial fibrillation Moderate voltage criteria for LVH, may be normal variant Nonspecific ST abnormality Abnormal ECG When compared with ECG of 27-APR-2018 08:48, 
Vent. rate has decreased BY  44 BPM 
QT has shortened CBC WITH AUTOMATED DIFF Collection Time: 10/26/18  7:52 PM  
Result Value Ref Range WBC 6.7 4.6 - 13.2 K/uL  
 RBC 4.93 4.70 - 5.50 M/uL  
 HGB 14.6 13.0 - 16.0 g/dL HCT 42.1 36.0 - 48.0 % MCV 85.4 74.0 - 97.0 FL  
 MCH 29.6 24.0 - 34.0 PG  
 MCHC 34.7 31.0 - 37.0 g/dL  
 RDW 15.5 (H) 11.6 - 14.5 % PLATELET 752 (L) 815 - 420 K/uL NEUTROPHILS 54 40 - 73 % LYMPHOCYTES 37 21 - 52 % MONOCYTES 7 3 - 10 % EOSINOPHILS 2 0 - 5 % BASOPHILS 0 0 - 2 %  
 ABS. NEUTROPHILS 3.6 1.8 - 8.0 K/UL  
 ABS. LYMPHOCYTES 2.5 0.9 - 3.6 K/UL  
 ABS. MONOCYTES 0.5 0.05 - 1.2 K/UL  
 ABS. EOSINOPHILS 0.1 0.0 - 0.4 K/UL  
 ABS. BASOPHILS 0.0 0.0 - 0.1 K/UL  
 DF AUTOMATED PLATELET COMMENTS DECREASED PLATELETS    
CARDIAC PANEL,(CK, CKMB & TROPONIN) Collection Time: 10/26/18  7:52 PM  
Result Value Ref Range CK 93 39 - 308 U/L  
 CK - MB 1.6 <3.6 ng/ml CK-MB Index 1.7 0.0 - 4.0 % Troponin-I, Qt. 0.04 0.0 - 9.070 NG/ML  
METABOLIC PANEL, COMPREHENSIVE Collection Time: 10/26/18  7:52 PM  
Result Value Ref Range Sodium 144 136 - 145 mmol/L Potassium 3.7 3.5 - 5.5 mmol/L Chloride 107 100 - 108 mmol/L  
 CO2 29 21 - 32 mmol/L Anion gap 8 3.0 - 18 mmol/L Glucose 100 (H) 74 - 99 mg/dL BUN 11 7.0 - 18 MG/DL Creatinine 1.19 0.6 - 1.3 MG/DL  
 BUN/Creatinine ratio 9 (L) 12 - 20 GFR est AA >60 >60 ml/min/1.73m2 GFR est non-AA >60 >60 ml/min/1.73m2 Calcium 9.7 8.5 - 10.1 MG/DL Bilirubin, total 0.9 0.2 - 1.0 MG/DL  
 ALT (SGPT) 20 16 - 61 U/L  
 AST (SGOT) 7 (L) 15 - 37 U/L Alk. phosphatase 84 45 - 117 U/L Protein, total 7.9 6.4 - 8.2 g/dL Albumin 4.0 3.4 - 5.0 g/dL Globulin 3.9 2.0 - 4.0 g/dL A-G Ratio 1.0 0.8 - 1.7 PROTHROMBIN TIME + INR Collection Time: 10/26/18  7:52 PM  
Result Value Ref Range Prothrombin time 13.7 11.5 - 15.2 sec INR 1.1 0.8 - 1.2 POC LACTIC ACID Collection Time: 10/26/18 10:32 PM  
Result Value Ref Range Lactic Acid (POC) 2.0 0.4 - 2.0 mmol/L Radiologic Studies -  
CT HEAD WO CONT Final Result XR CHEST PORT    (Results Pending) IR SPINAL PUNCTURE LUMBAR DIAGNOSTIC    (Results Pending) MRI BRAIN W WO CONT    (Results Pending) Ct Head Wo Cont Result Date: 10/26/2018 IMPRESSION:  1. Right temporal lobe hypodensity may represent infarct or or encephalitis. Correlate with clinical symptoms. Mild edema without midline shift. Brain MR with and without contrast may be beneficial to differentiate. 2.  Hypotension. 3.  Mild sinonasal mucosal disease. Results were communicated to URMILA Mathew on 10/26/2018 at 2115 hours. Medical Decision Making I am the first provider for this patient. I reviewed the vital signs, available nursing notes, past medical history, past surgical history, family history and social history. Vital Signs-Reviewed the patient's vital signs. Pulse Oximetry Analysis -  100% on room air (Interpretation) normal 
 
EKG: Interpreted by the EP. Time Interpreted: 19:39 Rate: 65 Rhythm: Atrial Fibrillation Interpretation: No STEMI  
 QTc 432. No acute ST or T wave changes, no STEMI. Records Reviewed: Nursing Notes (Time of Review: 8:01 PM) 
 
ED Course: Progress Notes, Reevaluation, and Consults: 
 
9:31 PM, 10/26/2018 Consult:  CT scan shows right temporal edema CVA vs encephalitis. Discussed care with Dr. Efren Groves (Neurology). Standard discussion; including history of patients chief complaint, available diagnostic results, and treatment course. He will evaluate patient. Will order MRI.  Sepsis bundle ordered with ceftriaxone, vancomycin and acyclovir. Radiology can do LP in AM. Nitro drip discontinued. 10:09 PM, 10/26/2018 Consult:  Discussed care with Dr. Tawny Johansen (Neurology). Standard discussion; including history of patients chief complaint, available diagnostic results, and treatment course. He states probably a stroke but he is unsure of onset. Pt is not a canidate for intervention. He recommends a MRI and Asprin.  
 
1:03 AM, 10/26/2018 Consult:  MRI more consistent with CVA, but not candidate for tx at si time. Patient re-evaluated and feels much better, more awake, alert, still no focal deficits. Discussed care with Dr. Kinjal Messer (Hospitalist). Standard discussion; including history of patients chief complaint, available diagnostic results, and treatment course. He accepts the patient for admission. Provider Notes (Medical Decision Making): Aria Hitchcock is a 60 y.o. male with PMHx of HTN, Afib, and diastolic heart failure who presents with c/o moderate constant aching headache that started \"today\" with associated Sx of SOB, abdominal distention, and right sided chest pain. Appears somewhat disoriented, but without focal neurologic deficit or meningismus. Differential Diagnosis: will evaluate for ICH, stroke, mass, less likely infectious etiology. Also possible CHF exacerbation, but low suspicion for ACS, pneumonia, pneumothorax, dissection. Testing: ct brain, cxr, cbc, cmp, troponin, ecg Treatments: pending evaluation, analgesia, IV nitro drip for hypertension and possible CHF, IV lasix given For Hospitalized Patients: 1. Critical Care Time: Critical Care Time: The services I provided to this patient were to treat and/or prevent clinically significant deterioration that could result in the failure of one or more body systems and/or organ systems due to suspected CVA, CHF with hypertension. Services included the following: 
-reviewing nursing notes and old charts 
-vital sign assessments -direct patient care 
-medication orders and management 
-interpreting and reviewing diagnostic studies/labs 
-re-evaluations 
-documentation time Aggregate critical care time was 45 minutes, which includes only time during which I was engaged in work directly related to the patient's care as described above, whether I was at bedside or elsewhere in the Emergency Department. It did not include time spent performing other reported procedures or the services of residents, students, nurses, or advance practice providers. Cynthia Godwin MD 
 
1:49 AM 
 
 
2. Hospitalization Decision Time: The decision to hospitalize the patient was made by Dr. Yessi Billy at 1:00AM on 10/26/2018 Diagnosis Clinical Impression: 1. Acute nonintractable headache, unspecified headache type 2. SOB (shortness of breath) 3. Suspected cerebrovascular accident (CVA) 4. Congestive heart failure, unspecified HF chronicity, unspecified heart failure type ([facility] Disposition: Admitted Follow-up Information None Medication List  
  
ASK your doctor about these medications   
aspirin 81 mg chewable tablet Take 1 Tab by mouth daily. carvedilol 25 mg tablet Commonly known as:  Rashaad Abdoulaye Take 1 Tab by mouth every twelve (12) hours. Indications: chronic heart failure 
  
furosemide 40 mg tablet Commonly known as:  LASIX Take 1 Tab by mouth daily. Or as directed 
  
lisinopril 20 mg tablet Commonly known as:  Yudy Brandon Take 1 Tab by mouth daily. methocarbamol 500 mg tablet Commonly known as:  ROBAXIN Take 1 Tab by mouth four (4) times daily. nitroglycerin 0.4 mg SL tablet Commonly known as:  NITROSTAT 
1 Tab by SubLINGual route every five (5) minutes as needed for Chest Pain. 
  
  
 
_______________________________ Attestations: 
Scribe Attestation Ching Cordon acting as a scribe for and in the presence of Cynthia Godwin MD     
October 26, 2018 at 8:01 PM 
    
 Provider Attestation:     
I personally performed the services described in the documentation, reviewed the documentation, as recorded by the scribe in my presence, and it accurately and completely records my words and actions. October 26, 2018 at 8:01 PM - Demetris Mckeon MD   
____________ 
___________________

## 2018-10-27 NOTE — CONSULTS
NEUROLOGY CONSULT NOTE    Patient ID:  Nita Land  [id number]  56 y.o.  1960    Date of Consultation:  October 27, 2018    Referring Physician: Dr Mattie Aparicio    Reason for Consultation:  Headache/stroke        Subjective:       History of Present Illness: This is a 61 y/o LHD AAM with known atrial fibrillation, CHF and HTN who presented to the ER with a primary complaint of acute onset focal right sided headache. He denies any other symptoms except for some shortness of breath and abdominal pain. He denies any weakness, numbness, slurring of speech or change in behavior. His brother also states that he seems like his usual self. He was prescribed some [address] before but couldn't afford this and instead has been taking an ASA per day. No fevers, chills, fatigue, light sensitivity, neck pain. Patient Active Problem List    Diagnosis Date Noted    Headache 10/27/2018    Suspected cerebrovascular accident (CVA) 10/27/2018    TIA (transient ischemic attack) 10/27/2018    CVA (cerebral vascular accident) ([facility] [address] 10/27/2018    Chronic combined systolic and diastolic congestive heart failure ([facility] [address] 03/01/2018    CHF (congestive heart failure) ([facility] 11/19/2016    Shortness of breath 58/59/7936    Diastolic HF (heart failure) ([facility])     Essential hypertension     Tobacco use     Atrial fibrillation ([facility] [address] 05/06/2015     Past Medical History:   Diagnosis Date    Atrial fibrillation (H[facility]) 05/06/2015    Persistent    Diastolic HF (heart failure) ([facility])     EF 45-50% on echocardiogram 2/2018    History of echocardiogram 02/06/2018    LVE. EF 45-50%. No WMA. Mod-severe conc LVH. Indeterminate diastolic fx. RVSP at least 45 mmHg. Severe LAE. Mod ROSEMARIE. Mod MR.    Mod TR. mild to moderate AI    Hypertension     Tobacco use       Past Surgical History:   Procedure Laterality Date    HX HERNIA REPAIR      HX HIP REPLACEMENT Bilateral     right in March 2013, left May 2013      Prior to Admission medications    Medication Sig Start Date End Date Taking? Authorizing Provider   furosemide (LASIX) 40 mg tablet Take 1 Tab by mouth daily. Or as directed 7/20/18   Jose Madden MD   lisinopril (PRINIVIL, ZESTRIL) 20 mg tablet Take 1 Tab by mouth daily. 7/9/18   Inocencio Beckett NP   carvedilol (COREG) 25 mg tablet Take 1 Tab by mouth every twelve (12) hours. Indications: chronic heart failure 4/27/18   Marii Ware MD   aspirin 81 mg chewable tablet Take 1 Tab by mouth daily. 2/9/18   Bayron Christensen MD   nitroglycerin (NITROSTAT) 0.4 mg SL tablet 1 Tab by SubLINGual route every five (5) minutes as needed for Chest Pain. 2/8/18   Bayron Christensen MD   methocarbamol (ROBAXIN) 500 mg tablet Take 1 Tab by mouth four (4) times daily. 10/27/16   URMILA Landaverde     No Known Allergies   Social History     Tobacco Use    Smoking status: Current Every Day Smoker     Packs/day: 0.50     Years: 40.00     Pack years: 20.00    Smokeless tobacco: Never Used    Tobacco comment: trying to quit   Substance Use Topics    Alcohol use: No      Family History   Problem Relation Age of Onset    Heart Failure Mother     Obesity Mother     Diabetes Father               Review of Systems    Pertinent items are noted in HPI. Objective:     Patient Vitals for the past 8 hrs:   BP Temp Pulse Resp SpO2 Height Weight   10/27/18 0819 (!) 141/97 97.3 °F (36.3 °C) 67 17 95 %     10/27/18 0506 151/90 96.5 °F (35.8 °C) (!) 54 16 97 %     10/27/18 0314 (!) 168/104 97.5 °F (36.4 °C) 65 16 98 % 5' 11\" (1.803 m) 123 kg (271 lb 1.6 oz)       General Exam  No acute distress, overweight body habitus, neck is supple    HEENT: Normocephalic, atraumatic, Sclera anicteric, normal conjunctiva  Mucous membranes: normal color and hydration     CV: Heart: irregular to rate and rhythm.   Neurologic Exam:    Mental status:  Alert, oriented to person, place, time and circumstance  No visual spatial neglect or overt apraxia  Language: normal fluency and comprehension    Cranial nerves: PERRL, VFFTC, Extraocular movements intact and full, face symmetric to movement, Tongue midline with normal strength, no dysarthria    Motor: strength 5/5 throughout  No abnormal movements    Coordination: Normal finger-nose-finger,    DTRs (R/L)  Biceps: (1/1)  Brachorad (1/1)  Triceps: (1/1)   Patellar (3/3)  Ankles (2/2)        Gait: not tested        Data Review:    Recent Results (from the past 24 hour(s))   EKG, 12 LEAD, INITIAL    Collection Time: 10/26/18  7:39 PM   Result Value Ref Range    Ventricular Rate 65 BPM    Atrial Rate 357 BPM    QRS Duration 108 ms    Q-T Interval 416 ms    QTC Calculation (Bezet) 432 ms    Calculated R Axis 13 degrees    Calculated T Axis 56 degrees    Diagnosis       Atrial fibrillation  Moderate voltage criteria for LVH, may be normal variant  Nonspecific ST abnormality  Abnormal ECG  When compared with ECG of 27-APR-2018 08:48,  Vent. rate has decreased BY  44 BPM  QT has shortened  Confirmed by Luis Butts ([id number]) on 10/27/2018 8:54:54 AM     CBC WITH AUTOMATED DIFF    Collection Time: 10/26/18  7:52 PM   Result Value Ref Range    WBC 6.7 4.6 - 13.2 K/uL    RBC 4.93 4.70 - 5.50 M/uL    HGB 14.6 13.0 - 16.0 g/dL    HCT 42.1 36.0 - 48.0 %    MCV 85.4 74.0 - 97.0 FL    MCH 29.6 24.0 - 34.0 PG    MCHC 34.7 31.0 - 37.0 g/dL    RDW 15.5 (H) 11.6 - 14.5 %    PLATELET 917 (L) 669 - 420 K/uL    NEUTROPHILS 54 40 - 73 %    LYMPHOCYTES 37 21 - 52 %    MONOCYTES 7 3 - 10 %    EOSINOPHILS 2 0 - 5 %    BASOPHILS 0 0 - 2 %    ABS. NEUTROPHILS 3.6 1.8 - 8.0 K/UL    ABS. LYMPHOCYTES 2.5 0.9 - 3.6 K/UL    ABS. MONOCYTES 0.5 0.05 - 1.2 K/UL    ABS. EOSINOPHILS 0.1 0.0 - 0.4 K/UL    ABS.  BASOPHILS 0.0 0.0 - 0.1 K/UL    DF AUTOMATED      PLATELET COMMENTS DECREASED PLATELETS     CARDIAC PANEL,(CK, CKMB & TROPONIN)    Collection Time: 10/26/18  7:52 PM   Result Value Ref Range    CK 93 39 - 308 U/L    CK - MB 1.6 <3.6 ng/ml CK-MB Index 1.7 0.0 - 4.0 %    Troponin-I, Qt. 0.04 0.0 - 5.954 NG/ML   METABOLIC PANEL, COMPREHENSIVE    Collection Time: 10/26/18  7:52 PM   Result Value Ref Range    Sodium 144 136 - 145 mmol/L    Potassium 3.7 3.5 - 5.5 mmol/L    Chloride 107 100 - 108 mmol/L    CO2 29 21 - 32 mmol/L    Anion gap 8 3.0 - 18 mmol/L    Glucose 100 (H) 74 - 99 mg/dL    BUN 11 7.0 - 18 MG/DL    Creatinine 1.19 0.6 - 1.3 MG/DL    BUN/Creatinine ratio 9 (L) 12 - 20      GFR est AA >60 >60 ml/min/1.73m2    GFR est non-AA >60 >60 ml/min/1.73m2    Calcium 9.7 8.5 - 10.1 MG/DL    Bilirubin, total 0.9 0.2 - 1.0 MG/DL    ALT (SGPT) 20 16 - 61 U/L    AST (SGOT) 7 (L) 15 - 37 U/L    Alk.  phosphatase 84 45 - 117 U/L    Protein, total 7.9 6.4 - 8.2 g/dL    Albumin 4.0 3.4 - 5.0 g/dL    Globulin 3.9 2.0 - 4.0 g/dL    A-G Ratio 1.0 0.8 - 1.7     PROTHROMBIN TIME + INR    Collection Time: 10/26/18  7:52 PM   Result Value Ref Range    Prothrombin time 13.7 11.5 - 15.2 sec    INR 1.1 0.8 - 1.2     HEMOGLOBIN A1C WITH EAG    Collection Time: 10/26/18  7:52 PM   Result Value Ref Range    Hemoglobin A1c 5.7 (H) 4.2 - 5.6 %    Est. average glucose 117 mg/dL   CULTURE, BLOOD    Collection Time: 10/26/18 10:27 PM   Result Value Ref Range    Special Requests: NO SPECIAL REQUESTS      Culture result: NO GROWTH AFTER 7 HOURS     POC LACTIC ACID    Collection Time: 10/26/18 10:32 PM   Result Value Ref Range    Lactic Acid (POC) 2.0 0.4 - 2.0 mmol/L   CULTURE, BLOOD    Collection Time: 10/26/18 10:42 PM   Result Value Ref Range    Special Requests: NO SPECIAL REQUESTS      Culture result: NO GROWTH AFTER 7 HOURS     GLUCOSE, POC    Collection Time: 10/27/18  3:28 AM   Result Value Ref Range    Glucose (POC) 91 70 - 110 mg/dL   GLUCOSE, POC    Collection Time: 10/27/18  8:09 AM   Result Value Ref Range    Glucose (POC) 101 70 - 110 mg/dL   CBC WITH AUTOMATED DIFF    Collection Time: 10/27/18  9:24 AM   Result Value Ref Range    WBC 6.8 4.6 - 13.2 K/uL    RBC 4. 95 4.70 - 5.50 M/uL    HGB 14.3 13.0 - 16.0 g/dL    HCT 42.3 36.0 - 48.0 %    MCV 85.5 74.0 - 97.0 FL    MCH 28.9 24.0 - 34.0 PG    MCHC 33.8 31.0 - 37.0 g/dL    RDW 15.6 (H) 11.6 - 14.5 %    PLATELET 308 (L) 728 - 420 K/uL    NEUTROPHILS 52 40 - 73 %    LYMPHOCYTES 35 21 - 52 %    MONOCYTES 10 3 - 10 %    EOSINOPHILS 3 0 - 5 %    BASOPHILS 0 0 - 2 %    ABS. NEUTROPHILS 3.6 1.8 - 8.0 K/UL    ABS. LYMPHOCYTES 2.4 0.9 - 3.6 K/UL    ABS. MONOCYTES 0.7 0.05 - 1.2 K/UL    ABS. EOSINOPHILS 0.2 0.0 - 0.4 K/UL    ABS. BASOPHILS 0.0 0.0 - 0.1 K/UL    DF AUTOMATED     METABOLIC PANEL, BASIC    Collection Time: 10/27/18  9:24 AM   Result Value Ref Range    Sodium 145 136 - 145 mmol/L    Potassium 3.6 3.5 - 5.5 mmol/L    Chloride 108 100 - 108 mmol/L    CO2 28 21 - 32 mmol/L    Anion gap 9 3.0 - 18 mmol/L    Glucose 92 74 - 99 mg/dL    BUN 10 7.0 - 18 MG/DL    Creatinine 1.06 0.6 - 1.3 MG/DL    BUN/Creatinine ratio 9 (L) 12 - 20      GFR est AA >60 >60 ml/min/1.73m2    GFR est non-AA >60 >60 ml/min/1.73m2    Calcium 9.6 8.5 - 10.1 MG/DL         Radiology studies: MRI reviewed and reveals an moderate sized acute ischemic infarct involving the right lateral temporal lobe. Assessment: This is a 59 y/o AAM with atrial fibrillation treated with ASA only who presented with a  Focal right temporal headache without other neurologic or systemic symptoms. This is almost certainly due to a cardioembolic stroke to the right MCA territory.  I have zero concerns for an infectious process to include meningitis or endocarditis              Active Problems:    Shortness of breath (11/18/2016)      Headache (10/27/2018)      Suspected cerebrovascular accident (CVA) (10/27/2018)      TIA (transient ischemic attack) (10/27/2018)      CVA (cerebral vascular accident) ([facility] [address] (10/27/2018)        Plan:     Recommendations: D/C all antibiotics, no need for LP  Permissive HTN  Given size of stroke would hold off on starting [facility] for about 4 more days and place on 325mg ASA for now  The source of the stroke is clear, no need for carotid imaging or further vascular imaging. Dean Chery M.D.   Clinical Neurophysiology  Neuromuscular specialist

## 2018-10-27 NOTE — ROUTINE PROCESS
TRANSFER - IN REPORT: 
 
Verbal report received from [address] RN(name) on Balta Wright  being received from ED(unit) for routine progression of care Report consisted of patients Situation, Background, Assessment and  
Recommendations(SBAR). Information from the following report(s) ED Summary, Accordion and Recent Results was reviewed with the receiving nurse. Opportunity for questions and clarification was provided. Assessment completed upon patients arrival to unit and care assumed. Primary Nurse Kaylynn Bueno RN and [facility], RN performed a dual skin assessment on this patient No impairment noted Alberto score is 21 Stroke Education provided to patient and the following topics were discussed 1. Patients personal risk factors for stroke are hypertension, obesity and CHF 2. Warning signs of Stroke: * Sudden numbness or weakness of the face, arm or leg, especially on one side of The body * Sudden confusion, trouble speaking or understanding * Sudden trouble seeing in one or both eyes * Sudden trouble walking, dizziness, loss of balance or coordination * Sudden severe headache with no known cause 3. Importance of activation Emergency Medical Services ( 9-1-1 ) immediately if experience any warning signs of stroke. 4. Be sure and schedule a follow-up appointment with your primary care doctor or any specialists as instructed. 5. You must take medicine every day to treat your risk factors for stroke. Be sure to take your medicines exactly as your doctor tells you: no more, no less. Know what your medicines are for , what they do. Anti-thrombotics /anticoagulants can help prevent strokes. You are taking the following medicine(s) 6.  Smoking and second-hand smoke greatly increase your risk of stroke, cardiovascular disease and death. Smoking history cigarettes,  per 7. Information provided was [facility] Stroke Education [employer] 8. Documentation of teaching completed in Patient Education Activity and on Care Plan with teaching response noted? yes Bedside and Verbal shift change report given to [facility] RN (oncoming nurse) by Sergio Butler RN (offgoing nurse). Report given with SBAR, Kardex, Intake/Output, MAR, Accordion and Recent Results.

## 2018-10-27 NOTE — PROGRESS NOTES
conducted an initial consultation and Spiritual Assessment for Nitza Potts, who is a 60 y.o.,male. Patients Primary Language is: [place]. According to the patients EMR Quaker Affiliation is: Non Judaism.  
 
The reason the Patient came to the hospital is:  
Patient Active Problem List  
 Diagnosis Date Noted  Headache 10/27/2018  Suspected cerebrovascular accident (CVA) 10/27/2018  TIA (transient ischemic attack) 10/27/2018  CVA (cerebral vascular accident) ([facility] 10/27/2018  Chronic combined systolic and diastolic congestive heart failure ([facility] 03/01/2018  CHF (congestive heart failure) ([facility] 11/19/2016  Shortness of breath 11/18/2016  Diastolic [facility]  Essential hypertension  Tobacco use  Atrial fibrillation ([facility] 05/06/2015 The  provided the following Interventions: 
Initiated a relationship of care and support. Listened empathically. Provided chaplaincy education. Provided information about Spiritual Care Services. Offered prayer and assurance of continued patient's behalf. Chart reviewed. The following outcomes where achieved: 
 
Patient expressed gratitude for 's visit. Assessment: 
Patient does not have any Anabaptism/cultural needs that will affect patients preferences in health care. There are no spiritual or Anabaptism issues which require intervention at this time. Plan: 
Chaplains will continue to follow and will provide pastoral care on an as needed/requested basis.  recommends bedside caregivers page  on duty if patient shows signs of acute spiritual or emotional distress. [address] [place] [address] Care  
[phone]

## 2018-10-27 NOTE — PROGRESS NOTES
NUTRITION Nursing Referral: [facility]  
  
RECOMMENDATIONS / PLAN:  
 
- No nutrition intervention indicated at this time. Will re-screen as appropriate. NUTRITION INTERVENTIONS & DIAGNOSIS:  
 
Nutrition Diagnosis:  No nutrition diagnosis at this time ASSESSMENT:  
 
Pt reported good appetite and meal intake PTA and since admission. Was NPO for a test today; noted cancelled per RN report. Pt ate lunch, he reported consuming 100% of meal. SLP following; recommend regular consistency diet and thin liquids. Pt with hx of drug abuse; encouraged pt to quit. Denied having any questions/ concerns at time of visit Average po intake adequate to meet patients estimated nutritional needs:   [x] Yes     [] No   [] Unable to determine at this time Diet: DIET CARDIAC Regular Food Allergies:  None known Current Appetite:   [x] Good     [] Fair     [] Poor     [] Other: 
Appetite/meal intake prior to admission:   [x] Good     [] Fair     [] Poor     [] Other: 
Feeding Limitations:  [] Swallowing difficulty    [] Chewing difficulty    [] Other: 
Current Meal Intake: No data found. BM: 10/25 - per pt report; denied feeling constipated Skin Integrity:  No pressure injury or wound noted Edema:   [x] No     [] Yes Pertinent Medications: Reviewed: lasix, SSI, zofran Recent Labs 10/27/18 
3860 10/26/18 
1952  144  
K 3.6 3.7  107 CO2 28 29 GLU 92 100* BUN 10 11 CREA 1.06 1.19 CA 9.6 9.7 ALB  --  4.0  
SGOT  --  7* ALT  --  20 Intake/Output Summary (Last 24 hours) at 10/27/2018 1519 Last data filed at 10/27/2018 1513 Gross per 24 hour Intake  Output 750 ml Net -750 ml Anthropometrics: 
Ht Readings from Last 1 Encounters:  
10/27/18 5' 11\" (1.803 m) Last 3 Recorded Weights in this Encounter 10/26/18 2216 10/26/18 2217 10/27/18 0522 Weight: 130 kg (286 lb 9.6 oz) 130 kg (286 lb 9.6 oz) 123 kg (271 lb 1.6 oz) Body mass index is 37.81 kg/m². Weight History:  Pt reported noticing weight gain PTA; stated wt is due to fluid retention Weight Metrics 10/27/2018 8/8/2018 4/16/2018 3/20/2018 3/1/2018 2/8/2018 2/2/2017 Weight 271 lb 1.6 oz 269 lb 275 lb 260 lb 261 lb 263 lb 8 oz 273 lb BMI 37.81 kg/m2 37.52 kg/m2 38.35 kg/m2 36.26 kg/m2 36.4 kg/m2 36.75 kg/m2 38.08 kg/m2 Admitting Diagnosis: Suspected cerebrovascular accident (CVA) Headache Shortness of breath 
TIA (transient ischemic attack) CVA (cerebral vascular accident) ([facility] Pertinent PMHx:  Heart failure, HTN, hx of marijuana abuse Education Needs:        [x] None identified  [] Identified - Not appropriate at this time  []  Identified and addressed - refer to education log Learning Limitations:   [x] None identified  [] Identified Cultural, Judaism & ethnic food preferences:  [x] None identified    [] Identified and addressed ESTIMATED NUTRITION NEEDS:  
 
Calories: 7546-6940 kcal (MSJx1.2-1.3) based on  [x] Actual BW: 123 kg      [] IBW Protein:  gm (0.8-1 gm/kg) based on  [x] Actual BW      [] IBW Fluid: 1 mL/kcal 
  
MONITORING & EVALUATION:  
 
Nutrition Goal(s): 1. Po intake of meals will meet >75% of patient estimated nutritional needs within the next 7 days. Outcome:  [] Met/Ongoing    []  Not Met    [x] New/Initial Goal  
 
Monitoring:   [x] Food and beverage intake   [x] Diet order   [x] Nutrition-focused physical findings   [x] Treatment/therapy   [] Weight   [] Enteral nutrition intake Previous Recommendations (for follow-up assessments only):     []   Implemented       []   Not Implemented (RD to address)      [] No Longer Appropriate     [] No Recommendation Made Discharge Planning:  Cardiac diet [x] Participated in care planning, discharge planning, & interdisciplinary rounds as appropriate Greg Ortega RD Pager: [phone]

## 2018-10-27 NOTE — H&P
HISTORY & PHYSICAL Patient: Cheryl Snyder MRN: [id number]  CSN: [id number] YOB: 1960  Age: 60 y.o. Sex: male DOA: 10/26/2018 LOS:  LOS: 0 days DOA: 10/26/2018 Assessment/Plan Active Problems: 
  Shortness of breath (11/18/2016) Headache (10/27/2018) Suspected cerebrovascular accident (CVA) (10/27/2018) TIA (transient ischemic attack) (10/27/2018) Plan: 
1. Intractable Headache - unclear etiology - CT head s/o Encephalitis vs CVA - Tele neuro consulted - MRI brain ordered - MRI s/o TIA but infn cannot be ruled out - Pt Rx with IV Abx - Vanco & Rocephin , IV Acyclovir -  will continue current meds - IR guided LP in AM , consult ID in AM  
2. HTN - BP elevated in the ER - started on NTG gtt - improved - now off infusion - resume home meds, monitor BP 3. Chronic marijuana Abuse 4. Chronic smoker 5. Pre DM - check A1c  
6. H/o CHF - last Echo shows mild reduction in EF  
DVT Px - Heparin FC Severity of Signs & Symptoms -- Moderate Risk of adverse events -- Moderate Current Medical Rx Plan - As Above Patient history & comorbidities - Per HPI Discharge Plan -- Home HPI:  
 
Cheryl Snyder is a 60 y.o. male who is being admitted for intractable headache that started yesterday & has been persistent He came to the ER at the suggestion of his wife & son He says the headache is located all over , not assoc with blurred vision , no Nausea / vomiting ER eval - CT head done showed ? Encephalitis vs TIA - tele Neuro consulted , advised MRI brain - likely TIA but cannot r/o Encephalitis - Started IV Abx , LP in AM  
Pt also noted to have elevated BP in the ER - started on NTG gtt - improved , now off drip Will admit to Neuro tele for further eval  
 
Past Medical History:  
Diagnosis Date  Atrial fibrillation ([address] 05/06/2015 Persistent  Diastolic HF (heart failure) ([address] EF 45-50% on echocardiogram 2/2018  History of echocardiogram 02/06/2018 LVE. EF 45-50%. No WMA. Mod-severe conc LVH. Indeterminate diastolic fx. RVSP at least 45 mmHg. Severe LAE. Mod ROSEMARIE. Mod MR. Mod TR. mild to moderate AI  
 Hypertension  Tobacco use Past Surgical History:  
Procedure Laterality Date  HX HERNIA REPAIR    
 HX HIP REPLACEMENT Bilateral   
 right in March 2013, left May 2013 Family History Problem Relation Age of Onset  Heart Failure Mother  Obesity Mother  Diabetes Father Social History Socioeconomic History  Marital status:  Spouse name: Not on file  Number of children: Not on file  Years of education: Not on file  Highest education level: Not on file Social Needs  Financial resource strain: Not on file  Food insecurity - worry: Not on file  Food insecurity - inability: Not on file  Transportation needs - medical: Not on file  Transportation needs - non-medical: Not on file Occupational History  Not on file Tobacco Use  Smoking status: Current Every Day Smoker Packs/day: 0.50 Years: 40.00 Pack years: 20.00  Smokeless tobacco: Never Used  Tobacco comment: trying to quit Substance and Sexual Activity  Alcohol use: No  
 Drug use: Yes Types: Marijuana Comment: daily  Sexual activity: Not on file Other Topics Concern  Not on file Social History Narrative  Not on file Prior to Admission medications Medication Sig Start Date End Date Taking? Authorizing Provider  
furosemide (LASIX) 40 mg tablet Take 1 Tab by mouth daily. Or as directed 7/20/18   Graham Madden MD  
lisinopril (PRINIVIL, ZESTRIL) 20 mg tablet Take 1 Tab by mouth daily. 7/9/18   Karen Beckett NP  
carvedilol (COREG) 25 mg tablet Take 1 Tab by mouth every twelve (12) hours.  Indications: chronic heart failure 4/27/18   Swathi Ayala MD  
 aspirin 81 mg chewable tablet Take 1 Tab by mouth daily. 2/9/18   Dipti Cristobal MD  
nitroglycerin (NITROSTAT) 0.4 mg SL tablet 1 Tab by SubLINGual route every five (5) minutes as needed for Chest Pain. 2/8/18   Dipti Cristobal MD  
methocarbamol (ROBAXIN) 500 mg tablet Take 1 Tab by mouth four (4) times daily. 10/27/16   URMILA Carranza No Known Allergies Review of Systems Review of systems not obtained due to patient factors. Physical Exam:  
  
Visit Vitals /70 Pulse 65 Temp 97.4 °F (36.3 °C) Resp 15 Ht 5' 10.98\" (1.803 m) Wt 130 kg (286 lb 9.6 oz) SpO2 98% BMI 39.99 kg/m² Physical Exam: 
 
Gen: In general, this is a well nourished male in no acute distress HEENT: Sclerae nonicteric. Oral mucous membranes moist. Dentition poor Neck: Supple with midline trachea. CV: RRR without murmur or rub appreciated. Resp:Respirations are unlabored without use of accessory muscles. Lung fields B/L without wheezes or rhonchi. Abd: Soft, nontender, nondistended. Extrem: Extremities are warm, without cyanosis or clubbing. Mild pitting pretibial edema. Skin: Warm, no visible rashes. Neuro: Patient is alert, oriented, and cooperative. No obvious focal defects. Moves all 4 extremities. Labs Reviewed: 
 
Recent Results (from the past 24 hour(s)) EKG, 12 LEAD, INITIAL Collection Time: 10/26/18  7:39 PM  
Result Value Ref Range Ventricular Rate 65 BPM  
 Atrial Rate 357 BPM  
 QRS Duration 108 ms Q-T Interval 416 ms  
 QTC Calculation (Bezet) 432 ms Calculated R Axis 13 degrees Calculated T Axis 56 degrees Diagnosis Atrial fibrillation Moderate voltage criteria for LVH, may be normal variant Nonspecific ST abnormality Abnormal ECG When compared with ECG of 27-APR-2018 08:48, 
Vent. rate has decreased BY  44 BPM 
QT has shortened CBC WITH AUTOMATED DIFF Collection Time: 10/26/18  7:52 PM  
Result Value Ref Range WBC 6.7 4.6 - 13.2 K/uL  
 RBC 4.93 4.70 - 5.50 M/uL  
 HGB 14.6 13.0 - 16.0 g/dL HCT 42.1 36.0 - 48.0 % MCV 85.4 74.0 - 97.0 FL  
 MCH 29.6 24.0 - 34.0 PG  
 MCHC 34.7 31.0 - 37.0 g/dL  
 RDW 15.5 (H) 11.6 - 14.5 % PLATELET 777 (L) 018 - 420 K/uL NEUTROPHILS 54 40 - 73 % LYMPHOCYTES 37 21 - 52 % MONOCYTES 7 3 - 10 % EOSINOPHILS 2 0 - 5 % BASOPHILS 0 0 - 2 %  
 ABS. NEUTROPHILS 3.6 1.8 - 8.0 K/UL  
 ABS. LYMPHOCYTES 2.5 0.9 - 3.6 K/UL  
 ABS. MONOCYTES 0.5 0.05 - 1.2 K/UL  
 ABS. EOSINOPHILS 0.1 0.0 - 0.4 K/UL  
 ABS. BASOPHILS 0.0 0.0 - 0.1 K/UL  
 DF AUTOMATED PLATELET COMMENTS DECREASED PLATELETS    
CARDIAC PANEL,(CK, CKMB & TROPONIN) Collection Time: 10/26/18  7:52 PM  
Result Value Ref Range CK 93 39 - 308 U/L  
 CK - MB 1.6 <3.6 ng/ml CK-MB Index 1.7 0.0 - 4.0 % Troponin-I, Qt. 0.04 0.0 - 7.568 NG/ML  
METABOLIC PANEL, COMPREHENSIVE Collection Time: 10/26/18  7:52 PM  
Result Value Ref Range Sodium 144 136 - 145 mmol/L Potassium 3.7 3.5 - 5.5 mmol/L Chloride 107 100 - 108 mmol/L  
 CO2 29 21 - 32 mmol/L Anion gap 8 3.0 - 18 mmol/L Glucose 100 (H) 74 - 99 mg/dL BUN 11 7.0 - 18 MG/DL Creatinine 1.19 0.6 - 1.3 MG/DL  
 BUN/Creatinine ratio 9 (L) 12 - 20 GFR est AA >60 >60 ml/min/1.73m2 GFR est non-AA >60 >60 ml/min/1.73m2 Calcium 9.7 8.5 - 10.1 MG/DL Bilirubin, total 0.9 0.2 - 1.0 MG/DL  
 ALT (SGPT) 20 16 - 61 U/L  
 AST (SGOT) 7 (L) 15 - 37 U/L Alk. phosphatase 84 45 - 117 U/L Protein, total 7.9 6.4 - 8.2 g/dL Albumin 4.0 3.4 - 5.0 g/dL Globulin 3.9 2.0 - 4.0 g/dL A-G Ratio 1.0 0.8 - 1.7 PROTHROMBIN TIME + INR Collection Time: 10/26/18  7:52 PM  
Result Value Ref Range Prothrombin time 13.7 11.5 - 15.2 sec INR 1.1 0.8 - 1.2 POC LACTIC ACID Collection Time: 10/26/18 10:32 PM  
Result Value Ref Range Lactic Acid (POC) 2.0 0.4 - 2.0 mmol/L Imaging Reviewed: 
 
CT Head IMPRESSION:   
  
 1.  Right temporal lobe hypodensity may represent infarct or or encephalitis. Correlate with clinical symptoms. Mild edema without midline shift. Brain MR 
with and without contrast may be beneficial to differentiate. 2.  Hypotension. 3.  Mild sinonasal mucosal disease. 
  
Results were communicated to URMILA Mathew on 10/26/2018 at 2115 hours. MRI Brain Report  pending Gianni Guillermo MD 
10/27/2018, 2:55 AM

## 2018-10-27 NOTE — PROGRESS NOTES
Bedside and Verbal shift change report given to Ivanna Zapien (oncoming nurse) by Krish Whipple RN 
 (offgoing nurse). Report given with SBAR, Kardex, MAR and Recent Results.

## 2018-10-27 NOTE — REHAB NOTE
[facility]U/IPR REFERRAL CONTACT NOTE [id number] D[facility]o R[facility] for Physical Rehabilitation IP Consult for IP Rehab Screen received. Will review case and advise as appropriate. Awaiting completion of PT/OT evaluations. Thank you for the consult.  
 
Karla Marquez, PTA

## 2018-10-27 NOTE — CONSULTS
[facility] - Consult Note    Consultation request by Bo Simmons MD for advice/opinion related to evaluating Suspected cerebrovascular accident (CVA)  Headache  Shortness of breath  TIA (transient ischemic attack)  CVA (cerebral vascular accident) [facility])    Date of  Admission: 10/26/2018  7:31 PM   Primary Care Physician:  Terrie Polk MD     The patient was seen, examined, and independently evaluated and I agree with the below assessment and plan by Gage Valdez PA-C with the following comments. This patient has had what appears to be an embolic stroke in the setting of atrial fibrillation. He had been on Xarelto up until about a year ago but he could not afford that medication and came off of it. When okay with nephrology I would recommend restarting anticoagulation either with Coumadin or possibly with 1 of the NOAC agents if we can get him the drug at a low enough co-pay. Kimberli did have a $4 co-pay card for patients on Medicare which the patient relates he has as long as they only have parts a and B and do not have part D which is drug coverage and he tells me that is the case for him. Assessment:     Patient Active Problem List   Diagnosis Code    Diastolic HF (heart failure) ([facility]) I50.30    Atrial fibrillation ([facility]) I48.91    Essential hypertension I10    Tobacco use Z72.0    Shortness of breath R06.02    CHF (congestive heart failure) ([facility]) I50.9    Chronic combined systolic and diastolic congestive heart failure (HCC) I50.42    Headache R51    Suspected cerebrovascular accident (CVA) R09.89    TIA (transient ischemic attack) G45.9    CVA (cerebral vascular accident) ([facility] [address] I63.9     -Acute CVA with probable small embolic R temporal lobe infarct.   -HTN out of control with history of adherence problems in past.  -Chronic atrial fibrillation- stable rates this admission   -HFrEF- with last echo February 2018 which showed a mildly depressed LV systolic function, EF 22-70% with moderate to severe LVH and severe left atrial enlargement, moderate mitral regurgitation and moderate pulmonary hypertension. RVSP 45 mmHg. He appears to be euvolemic.  -tobacco abuse  -Non-obstructive CAD    -primary cardiologist Dr. Micah Gutierres:     -Stable  -Will obtain echo to re-evaluate LV function and EF  -He appears to be volume stable today and no signs of failure- will continue to monitor  -Will allow for permissive HTN and defer to neurology on BP  -Continue with ASA, statin, BB, Lasix  -More recommendations pending echo and clinical course.  -Educated patient on adherence and dietary restrictions for fluid as well as sodium intake  -Will follow. History of Present Illness: This is a 60 y.o. male admitted for Suspected cerebrovascular accident (CVA)  Headache  Shortness of breath  TIA (transient ischemic attack)  CVA (cerebral vascular accident) ([facility] [address] Patient complains of:  Patient presented yesterday to the ER with the complaint of headaches to the R side of his head. He was found to have significantly elevated blood pressures and small embolic CVA's. Cardiology was asked to see and participate in his care. The patient prior to yesterday states that he was doing well and had no symptoms. He denies any chest pain, palpitations, lightheadedness, dizziness. He does admit that he gets off and on shortness of breath with some swelling to his legs. Cardiac risk factors: smoking/ tobacco exposure, family history, dyslipidemia, male gender, hypertension, HFrEF, adherence issues      Review of Symptoms:  Except as stated above include:  Constitutional:  negative  Respiratory:  negative  Cardiovascular:  negative  Gastrointestinal: negative  Genitourinary:  negative  Musculoskeletal:  Negative  Neurological:  Negative  Dermatological:  Negative  Endocrinological: Negative  Psychological:  Negative    Pertinent items are noted in HPI.      Past Medical History:     Past Medical History:   Diagnosis Date    Atrial fibrillation ([facility] [address] 05/06/2015    Persistent    Diastolic HF (heart failure) (HCC)     EF 45-50% on echocardiogram 2/2018    History of echocardiogram 02/06/2018    LVE. EF 45-50%. No WMA. Mod-severe conc LVH. Indeterminate diastolic fx. RVSP at least 45 mmHg. Severe LAE. Mod ROSEMARIE. Mod MR. Mod TR. mild to moderate AI    Hypertension     Tobacco use          Patient presents for a follow-up office visit. He is previously seen by Dr. Cris Colmenares. He has a past medical history significant for medication noncompliance. He has chronic atrial fibrillation, a hypertensive cardiomyopathy due to poorly controlled high blood pressure and ongoing tobacco use.     Patient was last hospitalized for several days at the beginning of February 2018 for hypertensive urgency and congestive heart failure. He was diuresed and started back on his blood pressure medications and his symptoms improved. He underwent an echocardiogram in February 2018 which showed a mildly depressed LV systolic function, EF 43-84% with moderate to severe LVH and severe left atrial enlargement, moderate mitral regurgitation and moderate pulmonary hypertension. RVSP 45 mmHg.     Patient underwent a right and left heart catheterization in March 2018 which demonstrated mild nonobstructive coronary disease involving his left circumflex artery. He had normal left ventricular ileum pressures, LVEDP 12 mmHg. Normal right sided heart pressures, PCWP 15 mmHg, PA 32/13 with a mean of 18 mmHg. At that time he was found to be mildly hypotensive and was felt to be over diuresed. His Lasix dosage was decreased to 20 mg daily, but the patient increase it back to 40 mg on his own because he noted weight gain.          Social History:     Social History     Socioeconomic History    Marital status:      Spouse name: Not on file    Number of children: Not on file    Years of education: Not on file    Highest education level: Not on file   Social Needs    Financial resource strain: Not on file    Food insecurity - worry: Not on file    Food insecurity - inability: Not on file    Transportation needs - medical: Not on file   What's in My Handbag needs - non-medical: Not on file   Occupational History    Not on file   Tobacco Use    Smoking status: Current Every Day Smoker     Packs/day: 0.50     Years: 40.00     Pack years: 20.00    Smokeless tobacco: Never Used    Tobacco comment: trying to quit   Substance and Sexual Activity    Alcohol use: No    Drug use: Yes     Types: Marijuana     Comment: daily    Sexual activity: Not on file   Other Topics Concern    Not on file   Social History Narrative    Not on file        Family History:     Family History   Problem Relation Age of Onset    Heart Failure Mother     Obesity Mother     Diabetes Father         Medications:   No Known Allergies     Current Facility-Administered Medications   Medication Dose Route Frequency    aspirin chewable tablet 81 mg  81 mg Oral DAILY    nitroglycerin (NITROSTAT) tablet 0.4 mg  0.4 mg SubLINGual Q5MIN PRN    furosemide (LASIX) tablet 40 mg  40 mg Oral DAILY    acetaminophen (TYLENOL) tablet 650 mg  650 mg Oral Q6H PRN    ondansetron (ZOFRAN) injection 4 mg  4 mg IntraVENous Q6H PRN    atorvastatin (LIPITOR) tablet 40 mg  40 mg Oral QHS    influenza vaccine 2018-19 (6 mos+)(PF) (FLUARIX QUAD/FLULAVAL QUAD) injection 0.5 mL  0.5 mL IntraMUSCular PRIOR TO DISCHARGE    glucose chewable tablet 16 g  4 Tab Oral PRN    glucagon (GLUCAGEN) injection 1 mg  1 mg IntraMUSCular PRN    dextrose (D50W) injection syrg 12.5-25 g  25-50 mL IntraVENous PRN    carvedilol (COREG) tablet 6.25 mg  6.25 mg Oral Q12H    insulin lispro (HUMALOG) injection   SubCUTAneous AC&HS    sodium chloride (NS) flush 5-10 mL  5-10 mL IntraVENous Q8H    sodium chloride (NS) flush 5-10 mL  5-10 mL IntraVENous PRN    furosemide (LASIX) tablet 40 mg  40 mg Oral ONCE    sodium chloride (NS) flush 5-10 mL  5-10 mL IntraVENous PRN         Physical Exam:     Visit Vitals  BP (!) 170/97 (BP 1 Location: Left arm, BP Patient Position: Supine)   Pulse 69   Temp 97.3 °F (36.3 °C)   Resp 19   Ht 5' 11\" (1.803 m)   Wt 123 kg (271 lb 1.6 oz)   SpO2 99%   BMI 37.81 kg/m²     BP Readings from Last 3 Encounters:   10/27/18 (!) 170/97   08/08/18 122/74   04/27/18 148/69     Pulse Readings from Last 3 Encounters:   10/27/18 69   08/08/18 61   04/27/18 73     Wt Readings from Last 3 Encounters:   10/27/18 123 kg (271 lb 1.6 oz)   08/08/18 122 kg (269 lb)   04/16/18 124.7 kg (275 lb)       General:  alert, cooperative, no distress, appears stated age  Neck:  nontender, no carotid bruit, no JVD  Lungs:  clear to auscultation bilaterally  Heart:  Irregularly irregular rate and rhythm, S1, S2 normal, no murmur, click, rub or gallop  Abdomen:  abdomen is soft without significant tenderness, masses, organomegaly or guarding  Extremities:  extremities normal, atraumatic, no cyanosis or edema  Skin: Warm and dry.  no hyperpigmentation, vitiligo, or suspicious lesions  Neuro: alert, oriented x3, affect appropriate, no focal neurological deficits, moves all extremities well, no involuntary movements  Psych: non focal     Data Review:     Recent Labs     10/27/18  0924 10/26/18  1952   WBC 6.8 6.7   HGB 14.3 14.6   HCT 42.3 42.1   * 115*     Recent Labs     10/27/18  0924 10/26/18  1952    144   K 3.6 3.7    107   CO2 28 29   GLU 92 100*   BUN 10 11   CREA 1.06 1.19   CA 9.6 9.7   ALB  --  4.0   SGOT  --  7*   ALT  --  20   INR  --  1.1       Results for orders placed or performed during the hospital encounter of 10/26/18   EKG, 12 LEAD, INITIAL   Result Value Ref Range    Ventricular Rate 65 BPM    Atrial Rate 357 BPM    QRS Duration 108 ms    Q-T Interval 416 ms    QTC Calculation (Bezet) 432 ms    Calculated R Axis 13 degrees    Calculated T Axis 56 degrees    Diagnosis       Atrial fibrillation  Moderate voltage criteria for LVH, may be normal variant  Nonspecific ST abnormality  Abnormal ECG  When compared with ECG of 2018 08:48,  Vent. rate has decreased BY  44 BPM  QT has shortened  Confirmed by Yolanda Mcmahan ([id number]) on 10/27/2018 8:54:54 AM     Results for orders placed or performed in visit on 18   AMB POC EKG ROUTINE W/ 12 LEADS, INTER & REP    Impression    See progress note. Results for orders placed or performed during the hospital encounter of 16   ECG HOLTER MONITOR, UP TO 48 HRS    Narrative                               Holter Monitoring Report                               [facility]                      [facility], [address]                                         Test Date:    2016  Pat Name:     Karo Pena          Department:     Patient ID:   [id number]                Room:           Gender:                                Technician:     :                         Requested By:  Juwan Escobedo MD  Order Number:                          Reading MD:   Johann Macias MD                             Interpretive Statements  Karo Pena was in Atrial Flutter. He was in AFIB for 100.00 percent of the recording time. The average heart rate, excluding ectopy, was 80 BPM with a minimum of 43 BPM  at  03:26D2 and a maximum of 170 BPM at   11:59D1. Heart beats, including ectopy, totaled 501948 beats. VENTRICULAR ECTOPICS totaled 3642  averaging  152. 2 per hour  with 3565  single, 44 paired, 24 trigeminy and 0 R on T. There was 1 VENTRICULAR TACHYCARDIA with 9 beats 23:47D1 at a rate of 115  BPM.    There were no SUPRAVENTRICULAR ECTOPICS found. PAUSES occurred 37 times, the longest of which was  2.3 seconds at 04:59:51    INTERPRETATION: Abnormal 24 hour Holter monitor study. 1. Rhythm is atrial flutter-fibrillation.   2. QRS normal.  3. (3565) single ve''s, (44) paired, (1) run of VENTRICULAR TACHYCARDIA,  trigeminy noted. 4. (37) pauses noted with the longest being 2.33 seconds. 5. There were no diary entries.     Electronically signed on 02-19-16 16:26:10 CST by Amina Wolfe MD       All Cardiac Markers in the last 24 hours:    Lab Results   Component Value Date/Time    CPK 93 10/26/2018 07:52 PM    CKMB 1.6 10/26/2018 07:52 PM    CKND1 1.7 10/26/2018 07:52 PM    TROIQ 0.04 10/26/2018 07:52 PM       Last Lipid:    Lab Results   Component Value Date/Time    Cholesterol, total 128 05/06/2015 07:52 AM    HDL Cholesterol 35 (L) 05/06/2015 07:52 AM    LDL, calculated 73.2 05/06/2015 07:52 AM    Triglyceride 99 05/06/2015 07:52 AM    CHOL/HDL Ratio 3.7 05/06/2015 07:52 AM       Signed By: Amina Wolfe DO     October 27, 2018

## 2018-10-27 NOTE — PROGRESS NOTES
Problem: Dysphagia (Adult) Goal: *Acute Goals and Plan of Care (Insert Text) Patient will: 1. Tolerate PO trials with 0 s/s overt distress in 4/5 trials-met Recommend:  
Regular diet with thin liquids Meds as tolerated General safe swallow precautions Outcome: Resolved/Met Date Met: 10/27/18 Speech LAnguage Pathology bedside swallow evaluation AND DISCHARGE Patient: Belkys Salazar (28 y.o. male) Date: 10/27/2018 Primary Diagnosis: Suspected cerebrovascular accident (CVA) Headache Shortness of breath 
TIA (transient ischemic attack) CVA (cerebral vascular accident) ([facility] [address] Precautions: None on file ASSESSMENT : 
Clinical beside swallow eval completed per MD orders with pt seated upright on side of bed, feeding self regular/thin lunch tray. Pt A&Ox4. Speech/voice within functional limits. Oral mech examination revealed structures functional for speech and deglutition. Pt observed with thin liquids +/- straw via single sips and successive swallows with timely swallow initiation, adequate laryngeal elevation to palpation and no overt s/sx aspiration. Pt demo positive rotary chew and thorough oral clearance with regular solids with no overt s/sx aspiration. Pt safe for regular diet with thin liquids, meds as tolerated with general safe swallow precautions. Pt educated with regard to s/sx aspiration, aspiration risk, diet recs and role of SLP. Pt able to verbalize understanding. Will sign off. Please re-consult as indicated. D/w RN. PLAN : 
Recommendations and Planned Interventions: 
No formal ST needs ID'd for dysphagia. Eval only. Discharge Recommendations: Do not anticipate further SLP needs upon discharge SUBJECTIVE:  
Patient stated Thank you. OBJECTIVE:  
 
Past Medical History:  
Diagnosis Date  Atrial fibrillation ([facility] [address] 05/06/2015 Persistent  Diastolic HF (heart failure) ([address] EF 45-50% on echocardiogram 2/2018  History of echocardiogram 02/06/2018 LVE. EF 45-50%. No WMA. Mod-severe conc LVH. Indeterminate diastolic fx. RVSP at least 45 mmHg. Severe LAE. Mod ROSEMARIE. Mod MR. Mod TR. mild to moderate AI  
 Hypertension  Tobacco use Past Surgical History:  
Procedure Laterality Date  HX HERNIA REPAIR    
 HX HIP REPLACEMENT Bilateral   
 right in March 2013, left May 2013 Prior Level of Function/Home Situation:  
Home Situation Home Environment: Private residence # Steps to Enter: 2 One/Two Story Residence: One story Living Alone: No 
Support Systems: Child(sabrina) Patient Expects to be Discharged to[de-identified] Private residence Current DME Used/Available at Home: None Diet prior to admission: Regular Current Diet:  Regular  Cognitive and Communication Status: 
Neurologic State: Alert Orientation Level: Oriented X4 Cognition: Follows commands, Appropriate decision making Oral Assessment: 
Oral Assessment Labial: No impairment Dentition: Natural;Limited Oral Hygiene: Good Lingual: No impairment Velum: No impairment Mandible: No impairment P.O. Trials: 
Patient Position: 90 on side of bed Vocal quality prior to P.O.: No impairment Consistency Presented: Thin liquid How Presented: Self-fed/presented; Successive swallows;Straw;Spoon Bolus Acceptance: No impairment Bolus Formation/Control: No impairment Propulsion: No impairment Oral Residue: None Initiation of Swallow: No impairment Laryngeal Elevation: Functional 
Aspiration Signs/Symptoms: None Oral Phase Severity: No impairment Pharyngeal Phase Severity : No impairment GCODESwallowing:  Swallow Current Status CH= 0% 
 Swallow Goal Status CH= 0% 
 Swallow D/C Status CH= 0% The severity rating is based on the following outcomes:   
Clinical judgment Pain: 
Pt reports 0/10 pain or discomfort prior to eval.  
Pt reports 0/10 pain or discomfort post eval.  
 
After treatment: []            Patient left in no apparent distress sitting up on side of bed  
[]            Patient left in no apparent distress in bed 
[]            Call bell left within reach 
[]            Nursing notified 
[]            Caregiver present 
[]            Bed alarm activated COMMUNICATION/EDUCATION:  
[x]            SLP educated pt with regard to aspiration s/sx and to alert MD/RN if symptoms arise. Pt able to verbalize understanding. Thank you for this referral. 
Linsey Evans M.S., CCC-SLP Speech-Language Pathologist

## 2018-10-28 ENCOUNTER — APPOINTMENT (OUTPATIENT)
Dept: NON INVASIVE DIAGNOSTICS | Age: 58
DRG: 065 | End: 2018-10-28
Attending: PHYSICIAN ASSISTANT
Payer: MEDICARE

## 2018-10-28 LAB
ALBUMIN SERPL-MCNC: 3.3 G/DL (ref 3.4–5)
ALBUMIN/GLOB SERPL: 0.9 {RATIO} (ref 0.8–1.7)
ALP SERPL-CCNC: 74 U/L (ref 45–117)
ALT SERPL-CCNC: 16 U/L (ref 16–61)
ANION GAP SERPL CALC-SCNC: 6 MMOL/L (ref 3–18)
APTT PPP: 30 SEC (ref 23–36.4)
AST SERPL-CCNC: 8 U/L (ref 15–37)
BASOPHILS # BLD: 0 K/UL (ref 0–0.1)
BASOPHILS NFR BLD: 0 % (ref 0–2)
BILIRUB SERPL-MCNC: 1 MG/DL (ref 0.2–1)
BUN SERPL-MCNC: 11 MG/DL (ref 7–18)
BUN/CREAT SERPL: 11 (ref 12–20)
CALCIUM SERPL-MCNC: 9.2 MG/DL (ref 8.5–10.1)
CHLORIDE SERPL-SCNC: 108 MMOL/L (ref 100–108)
CHOLEST SERPL-MCNC: 117 MG/DL
CO2 SERPL-SCNC: 27 MMOL/L (ref 21–32)
CREAT SERPL-MCNC: 1.04 MG/DL (ref 0.6–1.3)
DIFFERENTIAL METHOD BLD: ABNORMAL
ECHO AO ROOT DIAM: 3.64 CM
ECHO LA AREA 4C: 39.7 CM2
ECHO LA VOL 2C: 230.88 ML (ref 18–58)
ECHO LA VOL 4C: 157.29 ML (ref 18–58)
ECHO LA VOL BP: 212.35 ML (ref 18–58)
ECHO LA VOL/BSA BIPLANE: 88.22 ML/M2 (ref 16–28)
ECHO LA VOLUME INDEX A2C: 95.92 ML/M2 (ref 16–28)
ECHO LA VOLUME INDEX A4C: 65.35 ML/M2 (ref 16–28)
ECHO LV INTERNAL DIMENSION DIASTOLIC: 5.83 CM (ref 4.2–5.9)
ECHO LV INTERNAL DIMENSION SYSTOLIC: 4.6 CM
ECHO LV IVSD: 1.93 CM (ref 0.6–1)
ECHO LV MASS 2D: 722.8 G (ref 88–224)
ECHO LV MASS INDEX 2D: 300.3 G/M2 (ref 49–115)
ECHO LV POSTERIOR WALL DIASTOLIC: 1.92 CM (ref 0.6–1)
ECHO LVOT DIAM: 2.63 CM
ECHO LVOT PEAK GRADIENT: 3 MMHG
ECHO LVOT PEAK VELOCITY: 87.3 CM/S
ECHO LVOT VTI: 15.27 CM
ECHO RV INTERNAL DIMENSION: 3.85 CM
ECHO TV REGURGITANT MAX VELOCITY: 301.31 CM/S
ECHO TV REGURGITANT PEAK GRADIENT: 36.3 MMHG
EOSINOPHIL # BLD: 0.2 K/UL (ref 0–0.4)
EOSINOPHIL NFR BLD: 3 % (ref 0–5)
ERYTHROCYTE [DISTWIDTH] IN BLOOD BY AUTOMATED COUNT: 15 % (ref 11.6–14.5)
EST. AVERAGE GLUCOSE BLD GHB EST-MCNC: 120 MG/DL
GLOBULIN SER CALC-MCNC: 3.7 G/DL (ref 2–4)
GLUCOSE BLD STRIP.AUTO-MCNC: 104 MG/DL (ref 70–110)
GLUCOSE BLD STRIP.AUTO-MCNC: 127 MG/DL (ref 70–110)
GLUCOSE BLD STRIP.AUTO-MCNC: 133 MG/DL (ref 70–110)
GLUCOSE BLD STRIP.AUTO-MCNC: 97 MG/DL (ref 70–110)
GLUCOSE SERPL-MCNC: 92 MG/DL (ref 74–99)
HBA1C MFR BLD: 5.8 % (ref 4.2–5.6)
HCT VFR BLD AUTO: 40 % (ref 36–48)
HDLC SERPL-MCNC: 37 MG/DL (ref 40–60)
HDLC SERPL: 3.2 {RATIO} (ref 0–5)
HGB BLD-MCNC: 13.8 G/DL (ref 13–16)
LDLC SERPL CALC-MCNC: 65.2 MG/DL (ref 0–100)
LIPID PROFILE,FLP: ABNORMAL
LYMPHOCYTES # BLD: 2.1 K/UL (ref 0.9–3.6)
LYMPHOCYTES NFR BLD: 34 % (ref 21–52)
MCH RBC QN AUTO: 28.6 PG (ref 24–34)
MCHC RBC AUTO-ENTMCNC: 34.5 G/DL (ref 31–37)
MCV RBC AUTO: 82.8 FL (ref 74–97)
MONOCYTES # BLD: 0.7 K/UL (ref 0.05–1.2)
MONOCYTES NFR BLD: 12 % (ref 3–10)
NEUTS SEG # BLD: 3.1 K/UL (ref 1.8–8)
NEUTS SEG NFR BLD: 51 % (ref 40–73)
PLATELET # BLD AUTO: 103 K/UL (ref 135–420)
POTASSIUM SERPL-SCNC: 3.6 MMOL/L (ref 3.5–5.5)
PROT SERPL-MCNC: 7 G/DL (ref 6.4–8.2)
RBC # BLD AUTO: 4.83 M/UL (ref 4.7–5.5)
SODIUM SERPL-SCNC: 141 MMOL/L (ref 136–145)
TRIGL SERPL-MCNC: 74 MG/DL (ref ?–150)
VLDLC SERPL CALC-MCNC: 14.8 MG/DL
WBC # BLD AUTO: 6.1 K/UL (ref 4.6–13.2)

## 2018-10-28 PROCEDURE — 83036 HEMOGLOBIN GLYCOSYLATED A1C: CPT | Performed by: HOSPITALIST

## 2018-10-28 PROCEDURE — 85025 COMPLETE CBC W/AUTO DIFF WBC: CPT | Performed by: HOSPITALIST

## 2018-10-28 PROCEDURE — 74011000250 HC RX REV CODE- 250: Performed by: HOSPITALIST

## 2018-10-28 PROCEDURE — 80061 LIPID PANEL: CPT | Performed by: HOSPITALIST

## 2018-10-28 PROCEDURE — 82962 GLUCOSE BLOOD TEST: CPT

## 2018-10-28 PROCEDURE — 65660000000 HC RM CCU STEPDOWN

## 2018-10-28 PROCEDURE — 85730 THROMBOPLASTIN TIME PARTIAL: CPT | Performed by: HOSPITALIST

## 2018-10-28 PROCEDURE — 80053 COMPREHEN METABOLIC PANEL: CPT | Performed by: HOSPITALIST

## 2018-10-28 PROCEDURE — 97165 OT EVAL LOW COMPLEX 30 MIN: CPT

## 2018-10-28 PROCEDURE — 96374 THER/PROPH/DIAG INJ IV PUSH: CPT

## 2018-10-28 PROCEDURE — 74011250637 HC RX REV CODE- 250/637: Performed by: HOSPITALIST

## 2018-10-28 PROCEDURE — 97161 PT EVAL LOW COMPLEX 20 MIN: CPT

## 2018-10-28 PROCEDURE — 36415 COLL VENOUS BLD VENIPUNCTURE: CPT | Performed by: HOSPITALIST

## 2018-10-28 RX ORDER — SODIUM CHLORIDE 9 MG/ML
10 INJECTION INTRAMUSCULAR; INTRAVENOUS; SUBCUTANEOUS
Status: COMPLETED | OUTPATIENT
Start: 2018-10-28 | End: 2018-10-28

## 2018-10-28 RX ORDER — CARVEDILOL 25 MG/1
25 TABLET ORAL EVERY 12 HOURS
Status: DISCONTINUED | OUTPATIENT
Start: 2018-10-28 | End: 2018-10-29 | Stop reason: HOSPADM

## 2018-10-28 RX ADMIN — SODIUM CHLORIDE 10 ML: 9 INJECTION, SOLUTION INTRAMUSCULAR; INTRAVENOUS; SUBCUTANEOUS at 09:00

## 2018-10-28 RX ADMIN — CARVEDILOL 25 MG: 25 TABLET, FILM COATED ORAL at 22:12

## 2018-10-28 RX ADMIN — ACETAMINOPHEN 650 MG: 325 TABLET ORAL at 11:57

## 2018-10-28 RX ADMIN — Medication 10 ML: at 17:35

## 2018-10-28 RX ADMIN — ASPIRIN 81 MG CHEWABLE TABLET 81 MG: 81 TABLET CHEWABLE at 08:13

## 2018-10-28 RX ADMIN — Medication 10 ML: at 22:16

## 2018-10-28 RX ADMIN — Medication 10 ML: at 06:51

## 2018-10-28 RX ADMIN — CARVEDILOL 6.25 MG: 6.25 TABLET, FILM COATED ORAL at 08:13

## 2018-10-28 RX ADMIN — ATORVASTATIN CALCIUM 40 MG: 40 TABLET, FILM COATED ORAL at 22:12

## 2018-10-28 RX ADMIN — FUROSEMIDE 40 MG: 40 TABLET ORAL at 08:13

## 2018-10-28 RX ADMIN — ACETAMINOPHEN 650 MG: 325 TABLET ORAL at 19:32

## 2018-10-28 NOTE — PROGRESS NOTES
Problem: Mobility Impaired (Adult and Pediatric) Goal: *Acute Goals and Plan of Care (Insert Text) Outcome: Resolved/Met Date Met: 10/28/18 physical Therapy EVALUATION and Discharge Patient: Marisol Mao (51 y.o. male) Date: 10/28/2018 Primary Diagnosis: Suspected cerebrovascular accident (CVA) Headache Shortness of breath 
TIA (transient ischemic attack) CVA (cerebral vascular accident) ([facility] [address] Precautions:   (universal) ASSESSMENT AND RECOMMENDATIONS: 
PT orders received and patient cleared by nursing to participate with therapy. Patient is a 60 y.o. male admitted to the hospital due to headache with embolic CVA per MRI. Patient consents to PT evaluation and treatment. Pt's only CVA symptom is a headache. Pt denies any weakness, balance issues, slurred speech, or vision deficits. Pt has good strength, coordination, and balance. Pt has some weakness in L hip which pt states is normal from his hip replacement. Pt is independent/supervision with all mobility including bed mobility, transfers, gait 150 feet no AD, and stair training 4 steps B HRA. Pt does have an antalgic gait pattern with wide LISBET which pt states is his baseline. Pt has no inpatient PT needs or home PT needs at this time. Pt ended therapy sitting in chair with all needs met. Educated pt on OOB 3-5 times a day including sitting in chair, using bathroom, and ambulating in hallways. Educated pt on ankle pumps. Skilled physical therapy is not indicated at this time. Discharge Recommendations: None Further Equipment Recommendations for Discharge: N/A   
 
SUBJECTIVE:  
Patient stated My headache is better today.  OBJECTIVE DATA SUMMARY:  
 
Past Medical History:  
Diagnosis Date  Atrial fibrillation ([facility] [address] 05/06/2015 Persistent  Diastolic HF (heart failure) ([facility] [address] EF 45-50% on echocardiogram 2/2018  History of echocardiogram 02/06/2018 LVE.  EF 45-50%. No WMA. Mod-severe conc LVH. Indeterminate diastolic fx. RVSP at least 45 mmHg. Severe LAE. Mod ROSEMARIE. Mod MR. Mod TR. mild to moderate AI  
 Hypertension  Tobacco use Past Surgical History:  
Procedure Laterality Date  HX HERNIA REPAIR    
 HX HIP REPLACEMENT Bilateral   
 right in March 2013, left May 2013 Barriers to Learning/Limitations: None Compensate with: N/A Prior Level of Function/Home Situation: Independent with mobility including gait no AD inside his home and using a cane outside his home (longer distances and uneven surfaces). Home Situation Home Environment: Private residence # Steps to Enter: 3 Rails to Enter: Yes Hand Rails : Bilateral 
Wheelchair Ramp: No 
One/Two Story Residence: One story Living Alone: No 
Support Systems: Spouse/Significant Other/Partner, Child(sabrina)(wife and kids ) Patient Expects to be Discharged to[de-identified] Private residence Current DME Used/Available at Home: Montse Kenn, straight, Walker, rollingCritical Behavior: 
Neurologic State: Alert Psychosocial 
Patient Behaviors: Calm; Cooperative Purposeful Interaction: Yes Pt Identified Daily Priority: Clinical issues (comment) Caritas Process: Establish trust 
Caring Interventions: Reassure Reassure: InformingStrength:   
Strength: (B LE WFL except L hip 4-/5) Tone & Sensation:  
Tone: Normal(B LE) Sensation: Intact(B LE) Range Of Motion: 
AROM: Within functional limits(B LE) Functional Mobility: 
Bed Mobility: 
Supine to Sit: Modified independent Sit to Supine: Modified independent Transfers: 
Sit to Stand: Modified independent;Supervision Stand to Sit: Modified independent Balance:  
Sitting: Intact Standing: IntactAmbulation/Gait Training: 
Distance (ft): 150 Feet (ft) Assistive Device: (none) Ambulation - Level of Assistance: Supervision; Independent Base of Support: Widened Speed/Leydi: Pace decreased (<100 feet/min) Stairs: 
Number of Stairs Trained: 4 Stairs - Level of Assistance: Supervision Rail Use: BothTherapeutic Exercises: Ankle pumps Pain: 
Pre: 5/10 headache Post: 5/10 headache Activity Tolerance:  
good Please refer to the flowsheet for vital signs taken during this treatment. After treatment:  
[x] Patient left in no apparent distress sitting up in chair 
[] Patient left sitting on EOB [] Patient left in no apparent distress in bed 
[] Patient declined to be OOB at this time due to   
[x] Call bell left within reach [x] Nursing notified(Kristy) 
[] Caregiver present 
[] Bed alarm activated 
[x] Personal items in reach COMMUNICATION/EDUCATION:  
[x]         Fall prevention education was provided and the patient/caregiver indicated understanding. [x]         Patient/family have participated as able in goal setting and plan of care. [x]         Patient/family agree to work toward stated goals and plan of care. []         Patient understands intent and goals of therapy, but is neutral about his/her participation. []         Patient is unable to participate in goal setting and plan of care. Thank you for this referral. 
Jenn Kenney, PT, DPT Time Calculation: 10 mins Mobility [id number] Current  CI= 1-19%   Goal  CI= 1-19%  D/C  CI= 1-19%. The severity rating is based on the Level of Assistance required for Functional Mobility and ADLs. Eval Complexity: History: MEDIUM  Complexity : 1-2 comorbidities / personal factors will impact the outcome/ POC Exam:HIGH Complexity : 4+ Standardized tests and measures addressing body structure, function, activity limitation and / or participation in recreation  Presentation: LOW Complexity : Stable, uncomplicated  Clinical Decision Making:Low Complexity   Overall Complexity:LOW

## 2018-10-28 NOTE — PROGRESS NOTES
Problem: Self Care Deficits Care Plan (Adult) Goal: *Acute Goals and Plan of Care (Insert Text) Outcome: Resolved/Met Date Met: 10/28/18 Occupational Therapy EVALUATION/discharge Patient: Tran Calero (9 y.o. male) Date: 10/28/2018 Primary Diagnosis: Suspected cerebrovascular accident (CVA) Headache Shortness of breath 
TIA (transient ischemic attack) CVA (cerebral vascular accident) ([facility] Precautions:   Fall ASSESSMENT AND RECOMMENDATIONS: 
Based on the objective data described below, the patient is modified independent/supervision with functional mobility and self care tasks without an AD. Patient has WFL AROM and strength of BUEs. Patient is able to meghann/doff socks on EOB with mod I/supervision. Patient was modified independent with simulated toilet transfer. Skilled acute care occupational therapy is not indicated at this time. Discharge Recommendations: None Further Equipment Recommendations for Discharge: N/A Barriers to Learning/Limitations: None COMPLEXITY Eval Complexity: History: LOW Complexity : Brief history review ; Examination: LOW Complexity : 1-3 performance deficits relating to physical, cognitive , or psychosocial skils that result in activity limitations and / or participation restrictions ; Decision Making:LOW Complexity : No comorbidities that affect functional and no verbal or physical assistance needed to complete eval tasks  Assessment: Low Complexity G-CODES:  
 
Self Care  Current  CI= 1-19%  Goal  CI= 1-19%  D/C  CI= 1-19%. The severity rating is based on the Level of Assistance required for Functional Mobility and ADLs. SUBJECTIVE:  
Patient stated This is how I walk.  OBJECTIVE DATA SUMMARY:  
 
Past Medical History:  
Diagnosis Date  Atrial fibrillation ([facility] 05/06/2015 Persistent  Diastolic HF (heart failure) ([facility] EF 45-50% on echocardiogram 2/2018  History of echocardiogram 02/06/2018 LVE.  EF 45-50%. No WMA. Mod-severe conc LVH. Indeterminate diastolic fx. RVSP at least 45 mmHg. Severe LAE. Mod ROSEMARIE. Mod MR. Mod TR. mild to moderate AI  
 Hypertension  Tobacco use Past Surgical History:  
Procedure Laterality Date  HX HERNIA REPAIR    
 HX HIP REPLACEMENT Bilateral   
 right in March 2013, left May 2013 Prior Level of Function/Home Situation: Patient reported he was independent in basic self care tasks and functional mobility PTA. Home Situation Home Environment: Private residence # Steps to Enter: 3 Rails to Enter: Yes Hand Rails : Bilateral 
Wheelchair Ramp: No 
One/Two Story Residence: One story Living Alone: No 
Support Systems: Spouse/Significant Other/Partner, Child(sabrina)(wife and kids ) Patient Expects to be Discharged to[de-identified] Private residence Current DME Used/Available at Home: Cane, straight, Walker, rolling 
[x]     Right hand dominant   []     Left hand dominantCognitive/Behavioral Status: 
Neurologic State: Alert Orientation Level: Oriented X4 Cognition: Follows commands Safety/Judgement: Fall prevention; Awareness of environment;Home safety;Good awareness of safety precautions Skin: No skin changes noted Edema: No edema noted Vision/Perceptual:   
   Acuity: Within Defined Limits Coordination: 
Coordination: Within functional limits(BUEs) Balance: 
Sitting: Intact Standing: Intact Strength: 
Strength: Within functional limits(BUEs) Tone & Sensation: 
Tone: Normal(BUEs) Sensation: Intact(BUEs) Range of Motion: 
AROM: Within functional limits(BUEs) Functional Mobility and Transfers for ADLs: 
Bed Mobility: 
Supine to Sit: Modified independent Sit to Supine: Modified independent Scooting: Modified independent Transfers: 
Sit to Stand: Modified independent;Supervision Toilet Transfer : Supervision;Modified independent(simulated) ADL Assessment:(clinical judgement) Feeding: Independent Oral Facial Hygiene/Grooming: Modified Independent Bathing: Supervision Upper Body Dressing: Independent Lower Body Dressing: Supervision;Modified independent Toileting: Supervision;Modified independent Pain: 
Pt reports 0/10 pain or discomfort prior to treatment.   
Pt reports 0/10 pain or discomfort post treatment. Activity Tolerance:  
Good Please refer to the flowsheet for vital signs taken during this treatment. After treatment:  
[x]  Patient left in no apparent distress sitting up in chair 
[]  Patient left in no apparent distress in bed 
[x]  Call bell left within reach [x]  Nursing notified 
[]  Caregiver present 
[]  Bed alarm activated COMMUNICATION/EDUCATION:  
Communication/Collaboration: 
[]      Home safety education was provided and the patient/caregiver indicated understanding. [x]      Patient/family have participated as able and agree with findings and recommendations. []      Patient is unable to participate in plan of care at this time. Tristin Devi OTR/L Time Calculation: 10 mins

## 2018-10-28 NOTE — PROGRESS NOTES
[facility]  -  Progress Note Patient: Tran Calero MRN: [id number]  SSN: [id number] YOB: 1960  Age: 60 y.o. Sex: male Admit Date: 10/26/2018 The patient was seen, examined, and independently evaluated and I agree with the below assessment and plan by Louis King PA-C with the following comments. This patient appears to be doing reasonably well currently. I did discuss his case with neurology and they would like to hold off on anticoagulation for for 5 days since he had a moderate sized infarct to be sure there is no late signs of hemorrhage prior to initiating anticoagulant therapy with either Coumadin or a NOAC. Assessment:  
 
Hospital Problems  Date Reviewed: 8/8/2018 Codes Class Noted POA Headache ICD-10-CM: R51 ICD-9-CM: 784.0  10/27/2018 Unknown Suspected cerebrovascular accident (CVA) ICD-10-CM: R09.89 ICD-9-CM: 785.9  10/27/2018 Unknown  
   
 TIA (transient ischemic attack) ICD-10-CM: G45.9 ICD-9-CM: 435.9  10/27/2018 Unknown CVA (cerebral vascular accident) [facility]) ICD-10-CM: I63.9 ICD-9-CM: 434.91  10/27/2018 Unknown Shortness of breath ICD-10-CM: R06.02 
ICD-9-CM: 786.05  11/18/2016 Unknown  
   
  
 
-Acute CVA with probable small embolic R temporal lobe infarct. -HTN out of control with history of adherence problems in past. 
-Chronic atrial fibrillation- stable rates this admission -HFrEF- with last echo February 2018 which showed a mildly depressed LV systolic function, EF 10-28% with moderate to severe LVH and severe left atrial enlargement, moderate mitral regurgitation and moderate pulmonary hypertension.  RVSP 45 mmHg. He appears to be euvolemic. 
-tobacco abuse 
-Non-obstructive CAD 
  
-primary cardiologist Dr. Neftali Cardenas Plan:  
 
Stable His volume status appears to remain stable and will continue to monitor. Echo today with results pending. Continue with current therapy and regimen Long term anticoagulation with warfarin or NOAC if this can be obtained at reasonable co-pay to the patient when OK with neurology. Subjective: No new complaints. His headache is improving from yesterday Objective:  
  
Patient Vitals for the past 8 hrs: 
 Temp Pulse Resp BP SpO2  
10/28/18 0455 98.1 °F (36.7 °C) 67 19 (!) 140/97 96 % 10/28/18 0038 97.8 °F (36.6 °C) 75 18 137/83 98 % Patient Vitals for the past 96 hrs: 
 Weight 10/28/18 0455 124 kg (273 lb 4.8 oz) 10/27/18 0314 123 kg (271 lb 1.6 oz) 10/26/18 2217 130 kg (286 lb 9.6 oz) 10/26/18 2216 130 kg (286 lb 9.6 oz) 10/26/18 2215 130 kg (286 lb 9.6 oz) 10/26/18 1928 117.9 kg (260 lb) Intake/Output Summary (Last 24 hours) at 10/28/2018 0747 Last data filed at 10/27/2018 2222 Gross per 24 hour Intake  Output 1200 ml Net -1200 ml Physical Exam: 
General:  alert, cooperative, no distress, appears stated age Neck:  nontender, no JVD Lungs:  clear to auscultation bilaterally Heart:  irregularly irregular rhythm Abdomen:  abdomen is soft without significant tenderness, masses, organomegaly or guarding Extremities:  extremities normal, atraumatic, no cyanosis or edema Data Review:  
 
Labs: Results:  
   
Chemistry Recent Labs 10/28/18 
0141 10/27/18 
2908 10/26/18 
1952 GLU 92 92 100*  145 144  
K 3.6 3.6 3.7  108 107 CO2 27 28 29 BUN 11 10 11 CREA 1.04 1.06 1.19 CA 9.2 9.6 9.7 AGAP 6 9 8 BUCR 11* 9* 9* AP 74  --  84  
TP 7.0  --  7.9 ALB 3.3*  --  4.0  
GLOB 3.7  --  3.9 AGRAT 0.9  --  1.0  
  
CBC w/Diff Recent Labs 10/28/18 
0141 10/27/18 
3239 10/26/18 
1952 WBC 6.1 6.8 6.7  
RBC 4.83 4.95 4.93  
HGB 13.8 14.3 14.6 HCT 40.0 42.3 42.1 * 105* 115* GRANS 51 52 54 LYMPH 34 35 37 EOS 3 3 2 Cardiac Enzymes No results found for: CPK, CK, CKMMB, CKMB, RCK3, CKMBT, CKNDX, CKND1, JANNA, TROPT, TROIQ, GLENROY, TROPT, TNIPOC, BNP, BNPP Coagulation Recent Labs 10/28/18 
0141 10/26/18 
1952 PTP  --  13.7 INR  --  1.1 APTT 30.0  --   
   
Lipid Panel Lab Results Component Value Date/Time Cholesterol, total 117 10/28/2018 01:41 AM  
 HDL Cholesterol 37 (L) 10/28/2018 01:41 AM  
 LDL, calculated 65.2 10/28/2018 01:41 AM  
 VLDL, calculated 14.8 10/28/2018 01:41 AM  
 Triglyceride 74 10/28/2018 01:41 AM  
 CHOL/HDL Ratio 3.2 10/28/2018 01:41 AM  
  
BNP No results found for: BNP, BNPP, XBNPT Liver Enzymes Recent Labs 10/28/18 
0141 TP 7.0 ALB 3.3* AP 74 SGOT 8* Digoxin Thyroid Studies No results found for: T4, T3U, TSH, TSHEXT

## 2018-10-28 NOTE — ROUTINE PROCESS
Bedside and Verbal shift change report given to [facility] RN (oncoming nurse) by Tonya Sandoval RN (offgoing nurse). Report given with SBAR, Kardex, Intake/Output, MAR, Accordion and Recent Results.

## 2018-10-28 NOTE — PROGRESS NOTES
[facility] Patient: Rocael Born Age: 60 y.o. : 1960 MR#: [id number] [id number]: [id number] Date/Time: 10/28/2018 Subjective: pt feels great, no complaints Assessment/Plan: 1. Acute embolic CVA: no residual weakness. Echo pending. Neuro eval noted, d/w Dr. Michael Toth, recommend start anticoagulation after 5 days. Cont asa and statin 2. HTN: BP high, permissive HTN: neuro ok to resume med's 3. Chronic A fib: non complaint with anticoagulation so was stopped out pt. Will need to resume now, cardio in put noted. Cont coreg 4. Dyslipidemia: cont statin 5. Chronic systolic HF: EF 23%, repeat echo pending, well compensated 6. Tobacco abuse: adv on cessation 7. Thrombocytopenia: will monitor D/w pt in detail Plan for home tomorrow Need  assistance for [employer] I spent 40 minutes with the patient in face-to-face consultation, of which greater than 50% was spent in counseling and coordination of care as described above. Case discussed with:  [x]Patient  [x]Family  [x]Nursing  []Case Management DVT Prophylaxis:  []Lovenox  []Hep SQ  [x]SCDs  []Coumadin   []On Heparin gtt Objective:  
VS:  
Visit Vitals BP (!) 144/92 (BP 1 Location: Right arm, BP Patient Position: At rest) Comment: notified nurse cody Pulse 76 Temp 97.5 °F (36.4 °C) Resp 18 Ht 5' 11\" (1.803 m) Wt 123.8 kg (273 lb) SpO2 98% BMI 38.08 kg/m² Tmax/24hrs: Temp (24hrs), Av.8 °F (36.6 °C), Min:97.4 °F (36.3 °C), Max:98.2 °F (36.8 °C) IOBRIEF Intake/Output Summary (Last 24 hours) at 10/28/2018 1332 Last data filed at 10/28/2018 1251 Gross per 24 hour Intake 904 ml Output 1850 ml Net -946 ml General:  Alert, cooperative, no acute distress   
Pulmonary:  CTA Bilaterally. Cardiovascular: Regular rate and Rhythm. GI:  Soft, Non distended, Non tender. + Bowel sounds. Extremities:  No edema, cyanosis, clubbing. No calf tenderness. Psych: Good insight. Not anxious or agitated. Neurologic: Alert and oriented X 3. Motor 5/5 all ext. Medications:  
Current Facility-Administered Medications Medication Dose Route Frequency  carvedilol (COREG) tablet 25 mg  25 mg Oral Q12H  aspirin chewable tablet 81 mg  81 mg Oral DAILY  nitroglycerin (NITROSTAT) tablet 0.4 mg  0.4 mg SubLINGual Q5MIN PRN  
 furosemide (LASIX) tablet 40 mg  40 mg Oral DAILY  acetaminophen (TYLENOL) tablet 650 mg  650 mg Oral Q6H PRN  
 ondansetron (ZOFRAN) injection 4 mg  4 mg IntraVENous Q6H PRN  
 atorvastatin (LIPITOR) tablet 40 mg  40 mg Oral QHS  glucose chewable tablet 16 g  4 Tab Oral PRN  
 glucagon (GLUCAGEN) injection 1 mg  1 mg IntraMUSCular PRN  
 dextrose (D50W) injection syrg 12.5-25 g  25-50 mL IntraVENous PRN  
 insulin lispro (HUMALOG) injection   SubCUTAneous AC&HS  sodium chloride (NS) flush 5-10 mL  5-10 mL IntraVENous Q8H  
 sodium chloride (NS) flush 5-10 mL  5-10 mL IntraVENous PRN  
 sodium chloride (NS) flush 5-10 mL  5-10 mL IntraVENous PRN Labs:   
Recent Results (from the past 24 hour(s)) DRUG SCREEN, URINE Collection Time: 10/27/18  2:40 PM  
Result Value Ref Range BENZODIAZEPINES NEGATIVE  NEG    
 BARBITURATES NEGATIVE  NEG    
 THC (TH-CANNABINOL) POSITIVE (A) NEG    
 OPIATES NEGATIVE  NEG    
 PCP(PHENCYCLIDINE) NEGATIVE  NEG    
 COCAINE NEGATIVE  NEG    
 AMPHETAMINES NEGATIVE  NEG METHADONE NEGATIVE  NEG HDSCOM (NOTE) GLUCOSE, POC Collection Time: 10/27/18  3:33 PM  
Result Value Ref Range Glucose (POC) 105 70 - 110 mg/dL GLUCOSE, POC Collection Time: 10/27/18  9:10 PM  
Result Value Ref Range Glucose (POC) 95 70 - 110 mg/dL METABOLIC PANEL, COMPREHENSIVE Collection Time: 10/28/18  1:41 AM  
Result Value Ref Range Sodium 141 136 - 145 mmol/L  Potassium 3.6 3.5 - 5.5 mmol/L  
 Chloride 108 100 - 108 mmol/L  
 CO2 27 21 - 32 mmol/L Anion gap 6 3.0 - 18 mmol/L Glucose 92 74 - 99 mg/dL BUN 11 7.0 - 18 MG/DL Creatinine 1.04 0.6 - 1.3 MG/DL  
 BUN/Creatinine ratio 11 (L) 12 - 20 GFR est AA >60 >60 ml/min/1.73m2 GFR est non-AA >60 >60 ml/min/1.73m2 Calcium 9.2 8.5 - 10.1 MG/DL Bilirubin, total 1.0 0.2 - 1.0 MG/DL  
 ALT (SGPT) 16 16 - 61 U/L  
 AST (SGOT) 8 (L) 15 - 37 U/L Alk. phosphatase 74 45 - 117 U/L Protein, total 7.0 6.4 - 8.2 g/dL Albumin 3.3 (L) 3.4 - 5.0 g/dL Globulin 3.7 2.0 - 4.0 g/dL A-G Ratio 0.9 0.8 - 1.7 LIPID PANEL Collection Time: 10/28/18  1:41 AM  
Result Value Ref Range LIPID PROFILE Cholesterol, total 117 <200 MG/DL Triglyceride 74 <150 MG/DL  
 HDL Cholesterol 37 (L) 40 - 60 MG/DL  
 LDL, calculated 65.2 0 - 100 MG/DL VLDL, calculated 14.8 MG/DL  
 CHOL/HDL Ratio 3.2 0 - 5.0    
PTT Collection Time: 10/28/18  1:41 AM  
Result Value Ref Range aPTT 30.0 23.0 - 36.4 SEC  
CBC WITH AUTOMATED DIFF Collection Time: 10/28/18  1:41 AM  
Result Value Ref Range WBC 6.1 4.6 - 13.2 K/uL  
 RBC 4.83 4.70 - 5.50 M/uL  
 HGB 13.8 13.0 - 16.0 g/dL HCT 40.0 36.0 - 48.0 % MCV 82.8 74.0 - 97.0 FL  
 MCH 28.6 24.0 - 34.0 PG  
 MCHC 34.5 31.0 - 37.0 g/dL  
 RDW 15.0 (H) 11.6 - 14.5 % PLATELET 006 (L) 131 - 420 K/uL NEUTROPHILS 51 40 - 73 % LYMPHOCYTES 34 21 - 52 % MONOCYTES 12 (H) 3 - 10 % EOSINOPHILS 3 0 - 5 % BASOPHILS 0 0 - 2 %  
 ABS. NEUTROPHILS 3.1 1.8 - 8.0 K/UL  
 ABS. LYMPHOCYTES 2.1 0.9 - 3.6 K/UL  
 ABS. MONOCYTES 0.7 0.05 - 1.2 K/UL  
 ABS. EOSINOPHILS 0.2 0.0 - 0.4 K/UL  
 ABS. BASOPHILS 0.0 0.0 - 0.1 K/UL  
 DF AUTOMATED HEMOGLOBIN A1C WITH EAG Collection Time: 10/28/18  1:41 AM  
Result Value Ref Range Hemoglobin A1c 5.8 (H) 4.2 - 5.6 % Est. average glucose 120 mg/dL GLUCOSE, POC Collection Time: 10/28/18  7:15 AM  
Result Value Ref Range Partially impaired: cannot see medication labels or newsprint, but can see obstacles in path, and the surrounding layout; can count fingers at arm's length/reading glasses PRN Glucose (POC) 97 70 - 110 mg/dL ECHO ADULT COMPLETE Collection Time: 10/28/18  9:07 AM  
Result Value Ref Range LA Volume 212.35 (A) 18 - 58 mL Ao Root D 3.64 cm LVIDd 5.83 4.2 - 5.9 cm  
 LVPWd 1.92 (A) 0.6 - 1.0 cm LVIDs 4.60 cm IVSd 1.93 (A) 0.6 - 1.0 cm  
 LVOT d 2.63 cm  
 LVOT Peak Velocity 87.30 cm/s LVOT Peak Gradient 3.0 mmHg LVOT VTI 15.27 cm RVIDd 3.85 cm  
 LA Vol 4C 157.29 (A) 18 - 58 mL  
 LA Vol 2C 230.88 (A) 18 - 58 mL  
 LA Area 4C 39.7 cm2 LV Mass .8 (A) 88 - 224 g LV Mass AL Index 237.6 (A) 49 - 115 g/m2 Triscuspid Valve Regurgitation Peak Gradient 36.3 mmHg  
 TR Max Velocity 301.31 cm/s  
 LA Vol Index 88.22 16 - 28 ml/m2 LA Vol Index 95.92 16 - 28 ml/m2 LA Vol Index 65.35 16 - 28 ml/m2 GLUCOSE, POC Collection Time: 10/28/18 11:56 AM  
Result Value Ref Range Glucose (POC) 127 (H) 70 - 110 mg/dL Signed By: Erick Loza MD   
 October 28, 2018

## 2018-10-28 NOTE — PROGRESS NOTES
Problem: Falls - Risk of 
Goal: *Absence of Falls Document Reina Mcneil Fall Risk and appropriate interventions in the flowsheet. Outcome: Progressing Towards Goal 
Fall Risk Interventions: 
Mobility Interventions: Bed/chair exit alarm, OT consult for ADLs, Patient to call before getting OOB, PT Consult for mobility concerns, PT Consult for assist device competence, Strengthening exercises (ROM-active/passive) Medication Interventions: Bed/chair exit alarm, Evaluate medications/consider consulting pharmacy, Patient to call before getting OOB, Teach patient to arise slowly Elimination Interventions: Bed/chair exit alarm, Call light in reach, Patient to call for help with toileting needs Comments: Non skid foot wear intact

## 2018-10-28 NOTE — PROGRESS NOTES
.Bedside and Verbal shift change report given to Tomeka Coronado (oncoming nurse) by María Elena Germain RN 
 (offgoing nurse). Report given with RYDER, Marilyn, MAR and Recent Results.

## 2018-10-28 NOTE — PROGRESS NOTES
Neurology Progress Note Patient ID: Nitaz Potts [id number] 
84 y.o. 
1960 Subjective:  
  
Patient has some continued headache but is improved. No new complaints. Current Facility-Administered Medications Medication Dose Route Frequency  carvedilol (COREG) tablet 25 mg  25 mg Oral Q12H  aspirin chewable tablet 81 mg  81 mg Oral DAILY  nitroglycerin (NITROSTAT) tablet 0.4 mg  0.4 mg SubLINGual Q5MIN PRN  
 furosemide (LASIX) tablet 40 mg  40 mg Oral DAILY  acetaminophen (TYLENOL) tablet 650 mg  650 mg Oral Q6H PRN  
 ondansetron (ZOFRAN) injection 4 mg  4 mg IntraVENous Q6H PRN  
 atorvastatin (LIPITOR) tablet 40 mg  40 mg Oral QHS  glucose chewable tablet 16 g  4 Tab Oral PRN  
 glucagon (GLUCAGEN) injection 1 mg  1 mg IntraMUSCular PRN  
 dextrose (D50W) injection syrg 12.5-25 g  25-50 mL IntraVENous PRN  
 insulin lispro (HUMALOG) injection   SubCUTAneous AC&HS  sodium chloride (NS) flush 5-10 mL  5-10 mL IntraVENous Q8H  
 sodium chloride (NS) flush 5-10 mL  5-10 mL IntraVENous PRN  
 sodium chloride (NS) flush 5-10 mL  5-10 mL IntraVENous PRN Objective: Active hospital medications were reviewed Lab results and neuroradiology studies from the last 24 hours were reviewed. Prior to Admission medications Medication Sig Start Date End Date Taking? Authorizing Provider  
furosemide (LASIX) 40 mg tablet Take 1 Tab by mouth daily. Or as directed 7/20/18   Flavio Madden MD  
lisinopril (PRINIVIL, ZESTRIL) 20 mg tablet Take 1 Tab by mouth daily. 7/9/18   Sana Beckett NP  
carvedilol (COREG) 25 mg tablet Take 1 Tab by mouth every twelve (12) hours. Indications: chronic heart failure 4/27/18   Vikas Red MD  
aspirin 81 mg chewable tablet Take 1 Tab by mouth daily.  2/9/18   Samy Davis MD  
nitroglycerin (NITROSTAT) 0.4 mg SL tablet 1 Tab by SubLINGual route every five (5) minutes as needed for Chest Pain. 2/8/18   Rosie Farfan MD  
methocarbamol (ROBAXIN) 500 mg tablet Take 1 Tab by mouth four (4) times daily. 10/27/16   URMILA King Patient Vitals for the past 8 hrs: 
 BP Temp Pulse Resp SpO2 Height Weight 10/28/18 1221 (!) 144/92 97.5 °F (36.4 °C) 76 18 98 %    
10/28/18 0848 (!) 175/96     5' 11\" (1.803 m) 123.8 kg (273 lb) 10/28/18 0819 (!) 175/96        
10/28/18 0808 (!) 174/131 97.8 °F (36.6 °C) 76 18 94 %   [id number] 1901 - 10/28 0700 In: -  
Out: 6305 [[id number]] 10/26 1901 - 10/28 0700 In: -  
Out: 1200 [Urine:1200]RESULTRCNT(24h)Active Problems: 
  Shortness of breath (11/18/2016) Headache (10/27/2018) Suspected cerebrovascular accident (CVA) (10/27/2018) TIA (transient ischemic attack) (10/27/2018) CVA (cerebral vascular accident) ([facility] (10/27/2018) Additional comments:I reviewed the patient's new clinical lab test results. General Exam 
No acute distress, mucous membranes normal color and hydration status Neurologic Exam 
 
Mental status:  Alert, oriented to person, place, time and circumstance Language: normal fluency and comprehension No visual spatial neglect or overt apraxia Face symmetric, no dysarthria Assessment:  
 
Vinod Desai is a 60 y.o. who was admitted with focal right sided headache for about 3 days. MRI reveals a new right temporal cortical infarct. This occurred with atrial fibrillation on ASA only. Clearly, he requires anticoagulation. Given size of stroke would wait until tomorrow or Tuesday before initiating agent. That would put him at over 6 days out. He is certainly at risk for recurrent strokes. Plan: 1. Start anticoag in 1-2 days 2. No further w/u indicated. Kasandra Naylor M.D. Clinical Neurophysiology Neuromuscular Specialist 
 
Signed: 
10/28/2018 
3:19 PM 
3:19 PM

## 2018-10-29 VITALS
SYSTOLIC BLOOD PRESSURE: 130 MMHG | OXYGEN SATURATION: 97 % | HEIGHT: 71 IN | BODY MASS INDEX: 38.22 KG/M2 | HEART RATE: 66 BPM | TEMPERATURE: 97.7 F | RESPIRATION RATE: 18 BRPM | DIASTOLIC BLOOD PRESSURE: 83 MMHG | WEIGHT: 273 LBS

## 2018-10-29 LAB — GLUCOSE BLD STRIP.AUTO-MCNC: 138 MG/DL (ref 70–110)

## 2018-10-29 PROCEDURE — 74011250637 HC RX REV CODE- 250/637: Performed by: HOSPITALIST

## 2018-10-29 PROCEDURE — 82962 GLUCOSE BLOOD TEST: CPT

## 2018-10-29 RX ORDER — CARVEDILOL 25 MG/1
25 TABLET ORAL EVERY 12 HOURS
Qty: 60 TAB | Refills: 2 | Status: SHIPPED | OUTPATIENT
Start: 2018-10-29 | End: 2018-11-09 | Stop reason: SDUPTHER

## 2018-10-29 RX ORDER — GUAIFENESIN 100 MG/5ML
81 LIQUID (ML) ORAL DAILY
Qty: 30 TAB | Refills: 11 | Status: SHIPPED
Start: 2018-10-29 | End: 2018-10-29

## 2018-10-29 RX ORDER — FUROSEMIDE 40 MG/1
40 TABLET ORAL DAILY
Qty: 30 TAB | Refills: 0 | Status: SHIPPED | OUTPATIENT
Start: 2018-10-29 | End: 2018-11-09 | Stop reason: SDUPTHER

## 2018-10-29 RX ORDER — LISINOPRIL 20 MG/1
20 TABLET ORAL DAILY
Qty: 30 TAB | Refills: 0 | Status: SHIPPED | OUTPATIENT
Start: 2018-10-29 | End: 2018-11-09 | Stop reason: SDUPTHER

## 2018-10-29 RX ORDER — ATORVASTATIN CALCIUM 40 MG/1
40 TABLET, FILM COATED ORAL
Qty: 30 TAB | Refills: 0 | Status: SHIPPED | OUTPATIENT
Start: 2018-10-29 | End: 2018-11-09 | Stop reason: SDUPTHER

## 2018-10-29 RX ADMIN — CARVEDILOL 25 MG: 25 TABLET, FILM COATED ORAL at 09:15

## 2018-10-29 RX ADMIN — Medication 10 ML: at 09:16

## 2018-10-29 RX ADMIN — FUROSEMIDE 40 MG: 40 TABLET ORAL at 09:15

## 2018-10-29 RX ADMIN — ASPIRIN 81 MG CHEWABLE TABLET 81 MG: 81 TABLET CHEWABLE at 09:15

## 2018-10-29 NOTE — ROUTINE PROCESS
Patient ambulating in room and refusing to wear SCD's. Bedside and Verbal shift change report given to Pricila Duvall RN (oncoming nurse) by Etta Dietrich RN (offgoing nurse). Report given with SBAR, Kardex, Intake/Output, MAR, Accordion and Recent Results.

## 2018-10-29 NOTE — PROGRESS NOTES
Reason for Admission:   Suspected CVA RRAT Score:  14 Do you (patient/family) have any concerns for transition/discharge? YES. PATIENT STATES THAT HE CANNOT AFFORD HIS Consbhupendra Muñoz. Plan for utilizing home health:     Not recommeded at this time Likelihood of readmission? Moderate to high if cost of prescriptions remains an issue Transition of Care Plan:  Demographics confirmed with pt. He lives in a one story home with wife and two children. ADL's were being performed without assistance prior to admission. Pt states that he was driving himself to appointments. DME in the home include a walker and a cane. Phone number to [facility] [employer] assistance [place] given to pt to apply for lower or no copay assistance for xlerto. 30 free card faxed to pharmacy along with prescription. Dr. Swathi William aware. Pt states that if the 30 free card is not approved he will NOT have the funds to pay the copay. CM will continue to follow. Tripp Johnson RN, BSN 
[phone]

## 2018-10-29 NOTE — PROGRESS NOTES
Discharge papers given to the patient and discussed with him side effects and adverse reactions of medication. The patient was instructed to take his xaralto on 10/30/18 instead of 10/31/18. This information was written on the patient's discharge instruction sheet.

## 2018-10-29 NOTE — PROGRESS NOTES
[facility]  -  Progress Note Patient: Marisol Mao MRN: [id number]  SSN: [id number] YOB: 1960  Age: 60 y.o. Sex: male Admit Date: 10/26/2018 Assessment:  
 
Hospital Problems  Date Reviewed: 8/8/2018 Codes Class Noted POA Headache ICD-10-CM: R51 ICD-9-CM: 784.0  10/27/2018 Unknown Suspected cerebrovascular accident (CVA) ICD-10-CM: R09.89 ICD-9-CM: 785.9  10/27/2018 Unknown  
   
 TIA (transient ischemic attack) ICD-10-CM: G45.9 ICD-9-CM: 435.9  10/27/2018 Unknown CVA (cerebral vascular accident) [facility]) ICD-10-CM: I63.9 ICD-9-CM: 434.91  10/27/2018 Unknown Shortness of breath ICD-10-CM: R06.02 
ICD-9-CM: 786.05  11/18/2016 Unknown  
   
  
 
-Acute CVA with probable small embolic R temporal lobe infarct. -HTN out of control with history of adherence problems in past. 
-Chronic atrial fibrillation- stable rates this admission -HFrEF- with last echo February 2018 which showed a mildly depressed LV systolic function, EF 34-75% with moderate to severe LVH and severe left atrial enlargement, moderate mitral regurgitation and moderate pulmonary hypertension.  RVSP 45 mmHg. He appears to be euvolemic. 
-tobacco abuse 
-Non-obstructive CAD 
  
-primary cardiologist Dr. Gould Sites Plan:  
 
Cardiac status is stable. I did discuss with him at length about the importance of anticoagulation. It appears that he has found some money and will be able to afford Xarelto. I did discuss with him at length about the lower cost of Coumadin. All questions answered. Subjective: No new complaints. Objective:  
  
Patient Vitals for the past 8 hrs: 
 Temp Pulse Resp BP SpO2  
10/29/18 1224 97.7 °F (36.5 °C) 66 18 130/83 97 % 10/29/18 0839 97.3 °F (36.3 °C) 68 18 (!) 137/93 96 % Patient Vitals for the past 96 hrs: 
 Weight 10/28/18 0848 123.8 kg (273 lb) 10/28/18 0455 124 kg (273 lb 4.8 oz) 10/27/18 0314 123 kg (271 lb 1.6 oz) 10/26/18 2217 130 kg (286 lb 9.6 oz) 10/26/18 2216 130 kg (286 lb 9.6 oz) 10/26/18 2215 130 kg (286 lb 9.6 oz) 10/26/18 1928 117.9 kg (260 lb) Intake/Output Summary (Last 24 hours) at 10/29/2018 1239 Last data filed at 10/29/2018 8806 Gross per 24 hour Intake 941 ml Output  Net 941 ml Physical Exam: 
General:  alert, cooperative, no distress, appears stated age Neck:  no JVD Lungs:  clear to auscultation bilaterally Heart:  irregularly irregular rhythm Abdomen:  no guarding or rigidity Extremities:  no edema Data Review:  
 
Labs: Results:  
   
Chemistry Recent Labs 10/28/18 
0141 10/27/18 
0702 10/26/18 
1952 GLU 92 92 100*  145 144  
K 3.6 3.6 3.7  108 107 CO2 27 28 29 BUN 11 10 11 CREA 1.04 1.06 1.19 CA 9.2 9.6 9.7 AGAP 6 9 8 BUCR 11* 9* 9* AP 74  --  84  
TP 7.0  --  7.9 ALB 3.3*  --  4.0  
GLOB 3.7  --  3.9 AGRAT 0.9  --  1.0  
  
CBC w/Diff Recent Labs 10/28/18 
0141 10/27/18 
4443 10/26/18 
1952 WBC 6.1 6.8 6.7  
RBC 4.83 4.95 4.93  
HGB 13.8 14.3 14.6 HCT 40.0 42.3 42.1 * 105* 115* GRANS 51 52 54 LYMPH 34 35 37 EOS 3 3 2 Cardiac Enzymes No results found for: CPK, CK, CKMMB, CKMB, RCK3, CKMBT, CKNDX, CKND1, JANNA, TROPT, TROIQ, GLENROY, TROPT, TNIPOC, BNP, BNPP Coagulation Recent Labs 10/28/18 
0141 10/26/18 
1952 PTP  --  13.7 INR  --  1.1 APTT 30.0  --   
   
Lipid Panel Lab Results Component Value Date/Time Cholesterol, total 117 10/28/2018 01:41 AM  
 HDL Cholesterol 37 (L) 10/28/2018 01:41 AM  
 LDL, calculated 65.2 10/28/2018 01:41 AM  
 VLDL, calculated 14.8 10/28/2018 01:41 AM  
 Triglyceride 74 10/28/2018 01:41 AM  
 CHOL/HDL Ratio 3.2 10/28/2018 01:41 AM  
  
BNP No results found for: BNP, BNPP, XBNPT Liver Enzymes Recent Labs 10/28/18 
0141 TP 7.0 ALB 3.3* AP 74 SGOT 8* Digoxin Thyroid Studies No results found for: T4, T3U, TSH, TSHEXT

## 2018-10-29 NOTE — DISCHARGE INSTRUCTIONS
Discharge Instructions    Patient: Aria Hitchcock MRN: [id number]  CSN: [id number]    YOB: 1960  Age: 60 y.o. Sex: male    DOA: 10/26/2018 LOS:  LOS: 2 days   Discharge Date:      DIET:  Cardiac Diet    ACTIVITY: Activity as tolerated    ADDITIONAL INFORMATION: If you experience any of the following symptoms but not limited to Fever, chills, nausea, vomiting, diarrhea, change in mentation, falling, bleeding, shortness of breath, chest pain, please call your primary care physician or return to the emergency room if you cannot get hold of your doctor:     FOLLOW UP CARE:  Dr. Agnes Simpson MD in 5 days. Please call and set up an appointment. Dr. Nigel Gaines, cardio in 2-3 week  Dr. Kayla Xavier, neurology in 4 week      Dwayne Hoyt MD  10/29/2018 10:12 AM      Patient armband removed and shredded  MyChart Activation    Thank you for requesting access to [address] Please follow the instructions below to securely access and download your online medical record. [email or web address] allows you to send messages to your doctor, view your test results, renew your prescriptions, schedule appointments, and more. How Do I Sign Up? 1. In your internet browser, go to [email or web address]  2. Click on the First Time User? Click Here link in the Sign In box. You will be redirect to the New Member Sign Up page. 3. Enter your [email or web address] Access Code exactly as it appears below. You will not need to use this code after youve completed the sign-up process. If you do not sign up before the expiration date, you must request a new code. [employer] Access Code: Activation code not generated  Current [email or web address] Status: Patient Declined (This is the date your [employer] access code will )    4. Enter the last four digits of your Social Security Number (xxxx) and Date of Birth (mm/dd/yyyy) as indicated and click Submit. You will be taken to the next sign-up page. 5. Create a [email or web address] ID.  This will be your [email or web address] login ID and cannot be changed, so think of one that is secure and easy to remember. 6. Create a [employer] password. You can change your password at any time. 7. Enter your Password Reset Question and Answer. This can be used at a later time if you forget your password. 8. Enter your e-mail address. You will receive e-mail notification when new information is available in [address] 9. Click Sign Up. You can now view and download portions of your medical record. 10. Click the Download Summary menu link to download a portable copy of your medical information. Additional Information    If you have questions, please visit the Frequently Asked Questions section of the [employer] website at [email or web address] Remember, [employer] is NOT to be used for urgent needs. For medical emergencies, dial 911. DISCHARGE SUMMARY from Nurse    PATIENT INSTRUCTIONS:    After general anesthesia or intravenous sedation, for 24 hours or while taking prescription Narcotics:  · Limit your activities  · Do not drive and operate hazardous machinery  · Do not make important personal or business decisions  · Do  not drink alcoholic beverages  · If you have not urinated within 8 hours after discharge, please contact your surgeon on call.     Report the following to your surgeon:  · Excessive pain, swelling, redness or odor of or around the surgical area  · Temperature over 100.5  · Nausea and vomiting lasting longer than 4 hours or if unable to take medications  · Any signs of decreased circulation or nerve impairment to extremity: change in color, persistent  numbness, tingling, coldness or increase pain  · Any questions    What to do at Home:  Recommended activity: Activity as tolerated,     If you experience any of the following symptoms shortness of breath, chest pain, signs of stroke - see below, please follow up with ED or Primary MD.    *  Please give a list of your current medications to your Primary Care Provider. *  Please update this list whenever your medications are discontinued, doses are      changed, or new medications (including over-the-counter products) are added. *  Please carry medication information at all times in case of emergency situations. These are general instructions for a healthy lifestyle:    No smoking/ No tobacco products/ Avoid exposure to second hand smoke  Surgeon General's Warning:  Quitting smoking now greatly reduces serious risk to your health. Obesity, smoking, and sedentary lifestyle greatly increases your risk for illness    A healthy diet, regular physical exercise & weight monitoring are important for maintaining a healthy lifestyle    You may be retaining fluid if you have a history of heart failure or if you experience any of the following symptoms:  Weight gain of 3 pounds or more overnight or 5 pounds in a week, increased swelling in our hands or feet or shortness of breath while lying flat in bed. Please call your doctor as soon as you notice any of these symptoms; do not wait until your next office visit. Recognize signs and symptoms of STROKE:    F-face looks uneven    A-arms unable to move or move unevenly    S-speech slurred or non-existent    T-time-call 911 as soon as signs and symptoms begin-DO NOT go       Back to bed or wait to see if you get better-TIME IS BRAIN. Warning Signs of HEART ATTACK     Call 911 if you have these symptoms:   Chest discomfort. Most heart attacks involve discomfort in the center of the chest that lasts more than a few minutes, or that goes away and comes back. It can feel like uncomfortable pressure, squeezing, fullness, or pain.  Discomfort in other areas of the upper body. Symptoms can include pain or discomfort in one or both arms, the back, neck, jaw, or stomach.  Shortness of breath with or without chest discomfort.  Other signs may include breaking out in a cold sweat, nausea, or lightheadedness.   Don't wait more than five minutes to call 911 - MINUTES MATTER! Fast action can save your life. Calling 911 is almost always the fastest way to get lifesaving treatment. Emergency Medical Services staff can begin treatment when they arrive -- up to an hour sooner than if someone gets to the hospital by car. The discharge information has been reviewed with the patient. The patient verbalized understanding. Discharge medications reviewed with the patient and appropriate educational materials and side effects teaching were provided.   ___________________________________________________________________________________________________________________________________

## 2018-10-29 NOTE — PROGRESS NOTES
Problem: Falls - Risk of 
Goal: *Absence of Falls Document Jamel Santamaria Fall Risk and appropriate interventions in the flowsheet. Outcome: Progressing Towards Goal 
Fall Risk Interventions: 
Mobility Interventions: Bed/chair exit alarm, OT consult for ADLs, Patient to call before getting OOB, PT Consult for mobility concerns Medication Interventions: Patient to call before getting OOB Elimination Interventions: Call light in reach

## 2018-10-29 NOTE — PROGRESS NOTES
[facility]/IPR REFERRAL CONTACT NOTE [address] [place] [address] for Physical Rehabilitation RE: Joaquin Sosa Thank you for the opportunity to review this patient's case for admission to [address] [place] [address] for Physical Rehabilitation. Based on our pre-admission screening:  
 
[x ] This patient does not meet criteria for admission to [facility] for Physical Rehabilitation due to: 
 
[x ] Too functional, per documentation, patient does not require both Physical and Occupational Therapy Services at an acute rehabilitation level of intensity. Again, Thank you for this referral. Should you have any questions please do not hesitate to call. Sincerely, Khalida Johnson Admissions LiaFormerly [facility] 
[phone]

## 2018-10-29 NOTE — INTERDISCIPLINARY ROUNDS
Interdisciplinary STROKE Rounds Recommendations:  
 
Recommendations are as follows: 1. Per hospitalist orders, pt is to be on SCDs for DVT prophylaxis. When asked, the night nurse and the day nurse both report that the patient is refusing to wear them and that he is walking around the room. Requested Anisa to inform the hospitalist about this as SCDs are the only form of VTE prophylaxis that the patient has at this time and it is a CMS core measure. 2. Continue education about stroke risk factors, stroke recognition and medication compliance. 3. Per Dr. Oleksandr Bean, the NIHSS q4 hours is discontinued. However, with any acute neurological changes, the nurse should do an NIHSS, contact the doctor, and resume floor protocol for CVA/TIA patients. Stroke Meds currently prescribed: ASA 81 mg, Lipitor 40 mg 
DVT prophylaxis: SCDs ordered but not on or documented Discharge Plan: PT/OT discharged Patient admitted for: subacute stroke Discipline Participation: Interdisciplinary rounds were conducted by: 
Dr. Kaela Lobato, Neuroscience Medical director,  
Fidelina Thomson RN, stroke coordinator Linsey Evans, SLP, HILARIA SaldanaU liaison, Anisa SUH, the patient's nurse. Discussion included patient's current condition, any tests and patient's expected discharge outcome.

## 2018-10-29 NOTE — DISCHARGE SUMMARY
Discharge Summary    Patient: Estrella Madrid MRN: [id number]  CSN: [id number]    YOB: 1960  Age: 60 y.o. Sex: male    DOA: 10/26/2018 LOS:  LOS: 2 days   Discharge Date:      Admission Diagnoses: Suspected cerebrovascular accident (CVA)  Headache  Shortness of breath  TIA (transient ischemic attack)  CVA (cerebral vascular accident) [facility])    Discharge Diagnoses:  PLEASE SEE DICTATION. Discharge Condition: Stable    PHYSICAL EXAM  Visit Vitals  BP (!) 137/93 (BP 1 Location: Right arm, BP Patient Position: At rest)   Pulse 68   Temp 97.3 °F (36.3 °C)   Resp 18   Ht 5' 11\" (1.803 m)   Wt 123.8 kg (273 lb)   SpO2 96%   BMI 38.08 kg/m²       General: Alert, cooperative, no acute distress    Lungs:  CTA Bilaterally. Heart:  Regular rate and Rhythm. Abdomen: Soft, Non distended, Non tender. + Bowel sounds. Extremities: No edema/ cyanosis/ clubbing  Psych:   Good insight. Not anxious or agitated. Neurologic:  AA oriented X 3. Moves all extremities. Hospital Course: Please see dictation. code # [id number]. Neuro recommend to start xarelto from tomorrow, adv pt to start tomorrow and stop asa. D/w RN       Current Discharge Medication List      START taking these medications    Details   atorvastatin (LIPITOR) 40 mg tablet Take 1 Tab by mouth nightly. Qty: 30 Tab, Refills: 0      rivaroxaban (XARELTO) 20 mg tab tablet Take 1 Tab by mouth daily. Qty: 30 Tab, Refills: 0         CONTINUE these medications which have CHANGED    Details   carvedilol (COREG) 25 mg tablet Take 1 Tab by mouth every twelve (12) hours. Qty: 60 Tab, Refills: 2      furosemide (LASIX) 40 mg tablet Take 1 Tab by mouth daily. Or as directed  Qty: 30 Tab, Refills: 0      lisinopril (PRINIVIL, ZESTRIL) 20 mg tablet Take 1 Tab by mouth daily.   Qty: 30 Tab, Refills: 0         CONTINUE these medications which have NOT CHANGED    Details   nitroglycerin (NITROSTAT) 0.4 mg SL tablet 1 Tab by SubLINGual route every five (5) minutes as needed for Chest Pain. Qty: 1 Bottle, Refills: 1      methocarbamol (ROBAXIN) 500 mg tablet Take 1 Tab by mouth four (4) times daily. Qty: 30 Tab, Refills: 0         STOP taking these medications       aspirin 81 mg chewable tablet Comments:   Reason for Stopping:             · It is important that you take the medication exactly as they are prescribed. · Keep your medication in the bottles provided by the pharmacist and keep a list of the medication names, dosages, and times to be taken in your wallet. · Do not take other medications without consulting your doctor. DIET:  Cardiac Diet    ACTIVITY: Activity as tolerated    ADDITIONAL INFORMATION: If you experience any of the following symptoms but not limited to Fever, chills, nausea, vomiting, diarrhea, change in mentation, falling, bleeding, shortness of breath, chest pain, please call your primary care physician or return to the emergency room if you cannot get hold of your doctor:     FOLLOW UP CARE:  Dr. Ryne Fletcher MD in 5 days. Please call and set up an appointment.   Dr. Jairo Willis, cardio in 2-3 week  Dr. Mabel Mccollum, neurology in 4 week    Minutes spent on discharge: 40 minutes spent coordinating this discharge (review instructions/follow-up, prescriptions, preparing report for sign off)    Kathia Rapp MD  10/29/2018 10:13 AM

## 2018-10-30 NOTE — DISCHARGE SUMMARY
[facility] [place]    Meng Jefferson  MR#: [id number]  : 1960  ACCOUNT #: [de-identified]   ADMIT DATE: 10/26/2018  DISCHARGE DATE: 10/29/2018    PRIMARY CARE PHYSICIAN:  Delbert Arevalo MD    DISPOSITION:  Discharged to home. DISCHARGE CONDITION:  Stable. DISCHARGE DIAGNOSES:  1. Acute embolic cerebrovascular accident with no residual weakness. 2.  Hypertension. 3.  Chronic atrial fibrillation. 4.  Dyslipidemia. 5.  Chronic systolic heart failure, ejection fraction of around 45% in the past.  Repeat echocardiogram this admission shows ejection fraction of around 51-55%. 6.  Active tobacco abuse, advised cessation. 7.  Chronic thrombocytopenia  8. Noncompliance with medications. 9.  Obesity. DISCHARGE MEDICATIONS:  1. Aspirin 81 mg p.o. daily. The patient will be taking aspirin until he start Xarelto back on Wednesday. 2.  Lipitor 40 mg at bedtime. 3.  Coreg 25 mg b.i.d.  4.  Lasix 40 mg daily. 5.  Lisinopril 20 mg daily. 6.  Methocarbamol 500 mg 4 times a day as needed. 7.  Nitrostat p.r.n.    8.  Xarelto 20 mg daily to be started from 10/31/2018. CONSULTATIONS IN THE HOSPITAL:  Consultation with Dr. Mandi Burroughs, Neurology. Consultation with Dr. Mirella Garrett, Cardiology. MAJOR INVESTIGATIONS DURING THE HOSPITAL STAY:  The patient had a CT head on admission which showed a right temporal lobe hypodensity may represent infarct or encephalitis. The patient had an MRI brain which showed multifocal embolic right MCA territory infarct with predominant volume in the right anterolateral temporal lobe. Additional punctate areas of right MCA distribution regions of cortical and subcortical restricted diffusion noted. No hemorrhage is noted.       No procedures done in the hospital.    HOSPITAL COURSE:  This is a 62-year-old [facility] American male who has known history of chronic AFib, who was on anticoagulation in the past.  He could not afford Xarelto, but he was put on Coumadin, but he was very noncompliant, so the patient's Coumadin was discontinued because of his noncompliance. The patient was presented to the emergency room with a headache. CT head was done which showed concerning for stroke versus encephalitis. The patient was started on empiric antibiotics and was admitted to the hospital.  Neurology was consulted. MRI was also ordered. MRI came back embolic CVA in the MCA and terminal distribution. Neuro was consulted. Neuro saw the patient and recommended that the patient's symptoms are most likely stroke related. Did not think he has any signs of infection, so an LP was canceled and antibiotics were discontinued. We will start the patient most likely as an embolic CVA. Echo was ordered. Cardiology was consulted. Cardiology saw the patient and agreed that the patient would need long-term anticoagulation because of increased risk for stroke and with a current episode. Neuro recommended to hold off on anticoagulation at least until next Wednesday and because of his acute large stroke. The patient was continued on aspirin along with his CHF medications. The patient had an echocardiogram that showed EF is improved, but no other causes of stroke noted. Neuro recommended that the patient can be discharged from their standpoint. Recommended to start anticoagulation Tuesday until Wednesday of like around 6 days after the stroke. Patient is currently asymptomatic. He has been ambulating without any problem. PT, OT saw the patient, recommended no need for any home intervention. The patient's headache is improved now. I have discussed with the  who in turn discussed with the patient and also with the family. The patient says he cannot afford Xarelto because his copay is around 50 dollars per month, that he would like to take Xarelto for stroke prevention.   I did discuss with  who is going to in turn discuss with Xarelto rep and copay assistance program of Xarelto where the patient could qualify. The patient will be filling an application to get his prescription filled on the assistance program for copay. Currently, we will be dispensing 30 days Xarelto for the patient that was pre-called and I have advised the patient to start Xarelto from Wednesday. The patient will be continuing the aspirin until then. I have discussed with the patient about risk and benefits of Xarelto including risk of bleeding and also the risk of spontaneous bleeding from the GI and also risk of bleeding with the fall. He completely understood and agreed with the plan. I answered all his questions and concerns appropriately. I have discussed with neurologist that is okay for discharge. Discharge instructions, followup appointments and physical exam, please refer to the previous discharge summary. Patient alert and oriented x3. Discussed with the patient about discharge plan and followup appointments. I also tried to discuss with the wife but she could not get on the phone. I did discuss with her daughter about discharge plan and followup appointment. Both of them compared notes and agreed with the plan. I answered all their questions and concerns appropriately. I have discussed in great detail with the patient about smoking cessation. Discussed with him about medication compliance. I discussed with him that without anticoagulation he is at a very increased risk of stroke, which could lead to disability and death. He completely understood and agreed with my plan. I answered all his question and concerns appropriately.       Diane Hutson MD BT/TL  D: 10/29/2018 12:11     T: 10/30/2018 09:40  JOB #: [id number]  CC: Scott Quan MD  CC: Christophe Bennett MD  CC: Trever Hernandez MD

## 2018-11-02 LAB
BACTERIA SPEC CULT: NORMAL
BACTERIA SPEC CULT: NORMAL
SERVICE CMNT-IMP: NORMAL
SERVICE CMNT-IMP: NORMAL

## 2018-11-09 ENCOUNTER — OFFICE VISIT (OUTPATIENT)
Dept: CARDIOLOGY CLINIC | Age: 58
End: 2018-11-09

## 2018-11-09 VITALS
OXYGEN SATURATION: 97 % | DIASTOLIC BLOOD PRESSURE: 60 MMHG | HEART RATE: 69 BPM | HEIGHT: 71 IN | BODY MASS INDEX: 37.66 KG/M2 | WEIGHT: 269 LBS | SYSTOLIC BLOOD PRESSURE: 110 MMHG

## 2018-11-09 DIAGNOSIS — I50.42 CHRONIC COMBINED SYSTOLIC AND DIASTOLIC HEART FAILURE (HCC): Primary | ICD-10-CM

## 2018-11-09 PROBLEM — E66.01 SEVERE OBESITY (HCC): Status: ACTIVE | Noted: 2018-11-09

## 2018-11-09 RX ORDER — LISINOPRIL 20 MG/1
20 TABLET ORAL DAILY
Qty: 30 TAB | Refills: 6 | Status: SHIPPED | OUTPATIENT
Start: 2018-11-09

## 2018-11-09 RX ORDER — ATORVASTATIN CALCIUM 40 MG/1
40 TABLET, FILM COATED ORAL
Qty: 30 TAB | Refills: 6 | Status: SHIPPED | OUTPATIENT
Start: 2018-11-09

## 2018-11-09 RX ORDER — FUROSEMIDE 40 MG/1
40 TABLET ORAL DAILY
Qty: 30 TAB | Refills: 6 | Status: SHIPPED | OUTPATIENT
Start: 2018-11-09

## 2018-11-09 RX ORDER — CARVEDILOL 25 MG/1
25 TABLET ORAL EVERY 12 HOURS
Qty: 60 TAB | Refills: 6 | Status: SHIPPED | OUTPATIENT
Start: 2018-11-09

## 2018-11-09 NOTE — PROGRESS NOTES
Araceli Rodriguez presents today for post hospital follow-up after being hospitalized from 10/26/18 through 60/43/40 for acute embolic CVA. A MRI of the brain showed multifocal embolic right MCA territory infarct without hemorrhage. He had an echo done and it showed an EF of 51-55%, severe concentric LVH, LV global hypokinesis, and severe aortic valve calcification. In view of the new embolic CVA, anticoagulation was again recommended and he was started on Xarelto. He is a 60year old male with history of hypertension, CAD (s/p cath in 2008, showing non-critical disease of the left circumflex), tobacco abuse,chronic atrial fibrillation (non-compliant with anticoagulation), diastolic heart failure, and DJD with bilateral hip replacements . He also has obstructive sleep apnea for which he uses CPAP. In the past, he was started on Xarelto but could not afford the medication and was then switched to Coumadin. He was non-compliant with taking the Coumadin and coming in for protimes so the Coumadin was discontinued. He also admits to occasional marijuana use. He continues to smoke an occasional cigar and he states that he has not been drinking any alcohol. He was last seen by Dr. Warden Hernandez in August 2018. He denies chest pain, tightness, heaviness, and palpitations. He admits to occasional sharp shooting chest pain that lasts for only a few seconds. It does not radiate nor is it accompanied by shortness of breath, nausea, or diaphoresis. He denies shortness of breath at rest, admits to dyspnea on exertion, orthopnea and admits to occasional PND. He denies abdominal bloating. He denies lightheadedness, dizziness, and syncope. He denies lower extremity edema and claudication. Denies nausea, vomiting, diarrhea, fever, chills. He denies any residual from the stroke. PMH: 
Past Medical History:  
Diagnosis Date  Atrial fibrillation ([facility] [address] 05/06/2015 Persistent  Diastolic HF (heart failure) ([facility] [address] EF 45-50% on echocardiogram 2/2018  History of echocardiogram 02/06/2018 LVE. EF 45-50%. No WMA. Mod-severe conc LVH. Indeterminate diastolic fx. RVSP at least 45 mmHg. Severe LAE. Mod ROSEMARIE. Mod MR. Mod TR. mild to moderate AI  
 Hypertension  Tobacco use PSH: 
Past Surgical History:  
Procedure Laterality Date  HX HERNIA REPAIR    
 HX HIP REPLACEMENT Bilateral   
 right in March 2013, left May 2013 MEDS: 
Current Outpatient Medications Medication Sig  carvedilol (COREG) 25 mg tablet Take 1 Tab by mouth every twelve (12) hours.  furosemide (LASIX) 40 mg tablet Take 1 Tab by mouth daily. Or as directed  lisinopril (PRINIVIL, ZESTRIL) 20 mg tablet Take 1 Tab by mouth daily.  atorvastatin (LIPITOR) 40 mg tablet Take 1 Tab by mouth nightly.  rivaroxaban (XARELTO) 20 mg tab tablet Take 1 Tab by mouth daily.  nitroglycerin (NITROSTAT) 0.4 mg SL tablet 1 Tab by SubLINGual route every five (5) minutes as needed for Chest Pain.  methocarbamol (ROBAXIN) 500 mg tablet Take 1 Tab by mouth four (4) times daily. No current facility-administered medications for this visit. Allergies and Sensitivities: 
No Known Allergies Family History: 
Family History Problem Relation Age of Onset  Heart Failure Mother  Obesity Mother  Diabetes Father Social History: He  reports that he has been smoking. He has a 20.00 pack-year smoking history. he has never used smokeless tobacco.  He  reports that he does not drink alcohol. Physical: 
Visit Vitals /60 Pulse 69 Ht 5' 11\" (1.803 m) Wt 122 kg (269 lb) SpO2 97% BMI 37.52 kg/m² Exam: 
Neck:  Supple, no JVD, no carotid bruits CV:  Normal S1 and  S2, grade II/VI ALEJANDRA noted, no rubs, or gallops noted. Irregularly, irregular rhythm. Lungs:  Clear to ausculation throughout, no wheezes or rales Abd:  Soft, non-tender, non-distended with good bowel sounds. No hepatosplenomegaly Extremities:  No edema Data: 
EKG:    Read by Javier Bedolla MD - Atrial fibrillation. [address] PVCs.  Nonspecific ST abnormality LABS: 
Lab Results Component Value Date/Time Sodium 141 10/28/2018 01:41 AM  
 Potassium 3.6 10/28/2018 01:41 AM  
 Chloride 108 10/28/2018 01:41 AM  
 CO2 27 10/28/2018 01:41 AM  
 Glucose 92 10/28/2018 01:41 AM  
 BUN 11 10/28/2018 01:41 AM  
 Creatinine 1.04 10/28/2018 01:41 AM  
 
Lab Results Component Value Date/Time Cholesterol, total 117 10/28/2018 01:41 AM  
 HDL Cholesterol 37 (L) 10/28/2018 01:41 AM  
 LDL, calculated 65.2 10/28/2018 01:41 AM  
 Triglyceride 74 10/28/2018 01:41 AM  
 CHOL/HDL Ratio 3.2 10/28/2018 01:41 AM  
 
Lab Results Component Value Date/Time ALT (SGPT) 16 10/28/2018 01:41 AM  
 
 
Impression / Plan: 1. Essential Hypertension, blood pressure stable 2. Atrial fibrillation, rate controlled and currently anticoagulated with Xarelto 3. Chronic systolic and diastolic CHF, appears compensated. EF by echo was 51-55% 4. CAD, history of abnormal stress tests and cath in 2008 showed non-critical disease of the circumflex 5. Tobacco abuse and recreational drug use (marijuana) 6. S/p recent acute embolic CVA, no residual, right MCA territory Mr. Eric Alexandre was seen today for follow-up after a recent hospitalization for acute embolic CVA without residual effects. He states that he is feeling well and offers no cardiac complaints. While reviewing his medications, he states that he has been compliant with taking his medications. He has history of noncompliance in the past but states that this recent hospitalization made him realize that he needs to take his medications consistently and as prescribed. His blood pressure stable as is his heart rate.   He remains in atrial fibrillation with occasional PVCs and his heart rate is controlled. His breath sounds are clear and he does not exhibit any signs of volume overload. An echo done during this admission showed an ejection fraction of 51-55%, Congestive heart failure teaching reinforced today. Advised to limit sodium intake to no more than 2000mg per day and to also watch fluid intake. Advised to weigh daily every morning and record weights. Instructed to call our office if progressive weight gain is noted over a 2 to 3 day period of time, shortness of breath increases, or if abdominal bloating, nausea, fatigue, or increased lower extremity edema is noted. Patient education material regarding CHF, low-sodium diet, and sodium and fluid restrictions attached his after visit summary. The importance of compliance with his medication regimen and dietary and fluid restrictions discussed at length. He verbalizes understanding of the discussion. Smoking cessation discussed and highly encouraged. He was also encouraged to discontinue use of marijuana. Patient education material regarding smoking cessation and possible smoking cessation aids was attached his after visit summary. Greater than 50% of a 40-minute visit was spent in counseling, discussion, and answering questions. His carvedilol, furosemide, lisinopril, and atorvastatin were refilled today. He has about 2 weeks left of Xarelto and he is going to check with the company regarding the patient assistance form that he filled out and sent to them. I asked that he call our office if he does not hear back from the company so that we can possibly obtain samples for him until he gets definitive word of approval for the program. 
 
He will follow-up with   as scheduled and as needed. Renuka Sultana MSN, FNP-BC Please note:  Portions of this chart were created with Dragon medical speech to text program.  Unrecognized errors may be present.

## 2018-11-09 NOTE — PROGRESS NOTES
1. Have you been to the ER, urgent care clinic since your last visit? Hospitalized since your last visit? 10/26/18 CVA 2. Have you seen or consulted any other health care providers outside of the [address] [place] [address] since your last visit? Include any pap smears or colon screening.  no

## 2018-11-09 NOTE — PATIENT INSTRUCTIONS
Continue present medication regimen Refills for lasix (furosemide), Lipitor (atorvastatin), Coreg (carvediilol), Lisinopril e-scribed to [facility] on [address]. You have 2 weeks left of Xarelto, inquire about the patient assistance program that you signed up for at the hospital. 
Follow-up with Dr. Gould Sites as scheduled Please take all of your medications as prescribed Smoking cessation discussed and highly encouraged Stopping Smoking: Care Instructions Your Care Instructions Cigarette smokers crave the nicotine in cigarettes. Giving it up is much harder than simply changing a habit. Your body has to stop craving the nicotine. It is hard to quit, but you can do it. There are many tools that people use to quit smoking. You may find that combining tools works best for you. There are several steps to quitting. First you get ready to quit. Then you get support to help you. After that, you learn new skills and behaviors to become a nonsmoker. For many people, a necessary step is getting and using medicine. Your doctor will help you set up the plan that best meets your needs. You may want to attend a smoking cessation program to help you quit smoking. When you choose a program, look for one that has proven success. Ask your doctor for ideas. You will greatly increase your chances of success if you take medicine as well as get counseling or join a cessation program. 
Some of the changes you feel when you first quit tobacco are uncomfortable. Your body will miss the nicotine at first, and you may feel short-tempered and grumpy. You may have trouble sleeping or concentrating. Medicine can help you deal with these symptoms. You may struggle with changing your smoking habits and rituals. The last step is the tricky one: Be prepared for the smoking urge to continue for a time. This is a lot to deal with, but keep at it. You will feel better. Follow-up care is a key part of your treatment and safety. Be sure to make and go to all appointments, and call your doctor if you are having problems. It's also a good idea to know your test results and keep a list of the medicines you take. How can you care for yourself at home? · Ask your family, friends, and coworkers for support. You have a better chance of quitting if you have help and support. · Join a support group, such as Nicotine Anonymous, for people who are trying to quit smoking. · Consider signing up for a smoking cessation program, such as the American Lung Association's Freedom from Smoking program. 
· Get text messaging support. Go to the website at www.smokefree. gov to sign up for the [facility] program. 
· Set a quit date. Pick your date carefully so that it is not right in the middle of a big deadline or stressful time. Once you quit, do not even take a puff. Get rid of all ashtrays and lighters after your last cigarette. Clean your house and your clothes so that they do not smell of smoke. · Learn how to be a nonsmoker. Think about ways you can avoid those things that make you reach for a cigarette. ? Avoid situations that put you at greatest risk for smoking. For some people, it is hard to have a drink with friends without smoking. For others, they might skip a coffee break with coworkers who smoke. ? Change your daily routine. Take a different route to work or eat a meal in a different place. · Cut down on stress. Calm yourself or release tension by doing an activity you enjoy, such as reading a book, taking a hot bath, or gardening. · Talk to your doctor or pharmacist about nicotine replacement therapy, which replaces the nicotine in your body. You still get nicotine but you do not use tobacco. Nicotine replacement products help you slowly reduce the amount of nicotine you need. These products come in several forms, many of them available over-the-counter: ? Nicotine patches ? Nicotine gum and lozenges ? Nicotine inhaler · Ask your doctor about bupropion (Wellbutrin) or varenicline (Chantix), which are prescription medicines. They do not contain nicotine. They help you by reducing withdrawal symptoms, such as stress and anxiety. · Some people find hypnosis, acupuncture, and massage helpful for ending the smoking habit. · Eat a healthy diet and get regular exercise. Having healthy habits will help your body move past its craving for nicotine. · Be prepared to keep trying. Most people are not successful the first few times they try to quit. Do not get mad at yourself if you smoke again. Make a list of things you learned and think about when you want to try again, such as next week, next month, or next year. Where can you learn more? Go to http://aixa-lizet.info/. Enter J203 in the search box to learn more about \"Stopping Smoking: Care Instructions. \" Current as of: November 29, 2017 Content Version: 11.8 © 9578-7952 [employer]. Care instructions adapted under license by [employer] (which disclaims liability or warranty for this information). If you have questions about a medical condition or this instruction, always ask your healthcare professional. [address] any warranty or liability for your use of this information. Deciding About Using Medicines To Quit Smoking Deciding About Using Medicines To Quit Smoking What are the medicines you can use? Your doctor may prescribe varenicline (Chantix) or bupropion (Zyban). These medicines can help you cope with cravings for tobacco. They are pills that don't contain nicotine. You also can use nicotine replacement products. These do contain nicotine. There are many types. · Gum and lozenges slowly release nicotine into your mouth. · Patches stick to your skin.  They slowly release nicotine into your bloodstream. 
 · An inhaler has a saavedra that contains nicotine. You breathe in a puff of nicotine vapor through your mouth and throat. · Nasal spray releases a mist that contains nicotine. What are key points about this decision? · Using medicines can double your chances of quitting smoking. They can ease cravings and withdrawal symptoms. · Getting counseling along with using medicine can raise your chances of quitting even more. · If you smoke fewer than 5 cigarettes a day, you may not need medicines to help you quit smoking. · These medicines have less nicotine than cigarettes. And by itself, nicotine is not nearly as harmful as smoking. The tars, carbon monoxide, and other toxic chemicals in tobacco cause the harmful effects. · The side effects of nicotine replacement products depend on the type of product. For example, a patch can make your skin red and itchy. Medicines in pill form can make you sick to your stomach. They can also cause dry mouth and trouble sleeping. For most people, the side effects are not bad enough to make them stop using the products. Why might you choose to use medicines to quit smoking? · You have tried on your own to stop smoking, but you were not able to stop. · You smoke more than 5 cigarettes a day. · You want to increase your chances of quitting smoking. · You want to reduce your cravings and withdrawal symptoms. · You feel the benefits of medicine outweigh the side effects. Why might you choose not to use medicine? · You want to try quitting on your own by stopping all at once (\"cold turkey\"). · You want to cut back slowly on the number of cigarettes you smoke. · You smoke fewer than 5 cigarettes a day. · You do not like using medicine. · You feel the side effects of medicines outweigh the benefits. · You are worried about the cost of medicines. Your decision Thinking about the facts and your feelings can help you make a decision that is right for you. Be sure you understand the benefits and risks of your options, and think about what else you need to do before you make the decision. Where can you learn more? Go to http://aixa-lizet.info/. Enter W366 in the search box to learn more about \"Deciding About Using Medicines To Quit Smoking. \" Current as of: November 29, 2017 Content Version: 11.8 © 5260-6664 [employer]. Care instructions adapted under license by [employer] (which disclaims liability or warranty for this information). If you have questions about a medical condition or this instruction, always ask your healthcare professional. [address] any warranty or liability for your use of this information.

## 2018-11-19 ENCOUNTER — DOCUMENTATION ONLY (OUTPATIENT)
Dept: NEUROLOGY | Age: 58
End: 2018-11-19

## 2018-11-20 ENCOUNTER — OFFICE VISIT (OUTPATIENT)
Dept: NEUROLOGY | Age: 58
End: 2018-11-20

## 2018-11-20 VITALS
TEMPERATURE: 97.4 F | RESPIRATION RATE: 20 BRPM | WEIGHT: 266 LBS | DIASTOLIC BLOOD PRESSURE: 68 MMHG | HEART RATE: 73 BPM | HEIGHT: 71 IN | SYSTOLIC BLOOD PRESSURE: 90 MMHG | OXYGEN SATURATION: 100 % | BODY MASS INDEX: 37.24 KG/M2

## 2018-11-20 DIAGNOSIS — G45.9 TIA (TRANSIENT ISCHEMIC ATTACK): ICD-10-CM

## 2018-11-20 DIAGNOSIS — R09.89 SUSPECTED CEREBROVASCULAR ACCIDENT (CVA): ICD-10-CM

## 2018-11-20 DIAGNOSIS — I63.411 CEREBROVASCULAR ACCIDENT (CVA) DUE TO EMBOLISM OF RIGHT MIDDLE CEREBRAL ARTERY (HCC): Primary | ICD-10-CM

## 2018-11-20 NOTE — LETTER
11/20/2018 10:27 AM 
 
Patient:  Vinod Desai YOB: 1960 Date of Visit: 11/20/2018 Dear Haley Gabriel MD 
[address] [facility] [address] [id number] VIA Facsimile: [phone] 
 : Thank you for referring Mr. Sophia Lloyd to me for evaluation/treatment. Below are the relevant portions of my assessment and plan of care. If you have questions, please do not hesitate to call me. I look forward to following Mr. Marleen Resendez along with you.  
 
 
 
Sincerely, 
 
 
Ninfa Polk MD

## 2018-11-20 NOTE — PROGRESS NOTES
Vinod Desai is a 60 y.o. male in today for [facility] hospital follow-up. Learning assessment completed today; primary language is [place].

## 2018-11-20 NOTE — PROGRESS NOTES
Re:  Jc Brennan up visit     11/20/2018 10:20 AM 
 
SSN: [id number] Subjective:   Deedee Tran is seen in follow up for his stroke suffered back in October 2018. He's had a right temporal stroke. He has a mild headache in the right occipital region. Medications:   
Current Outpatient Medications Medication Sig Dispense Refill  carvedilol (COREG) 25 mg tablet Take 1 Tab by mouth every twelve (12) hours. 60 Tab 6  furosemide (LASIX) 40 mg tablet Take 1 Tab by mouth daily. Or as directed 30 Tab 6  
 lisinopril (PRINIVIL, ZESTRIL) 20 mg tablet Take 1 Tab by mouth daily. 30 Tab 6  
 atorvastatin (LIPITOR) 40 mg tablet Take 1 Tab by mouth nightly. 30 Tab 6  
 rivaroxaban (XARELTO) 20 mg tab tablet Take 1 Tab by mouth daily. 30 Tab 0  
 nitroglycerin (NITROSTAT) 0.4 mg SL tablet 1 Tab by SubLINGual route every five (5) minutes as needed for Chest Pain. 1 Bottle 1  
 methocarbamol (ROBAXIN) 500 mg tablet Take 1 Tab by mouth four (4) times daily. 30 Tab 0 Vital signs:   
Visit Vitals BP 90/68 (BP 1 Location: Left arm, BP Patient Position: Sitting) Pulse 73 Temp 97.4 °F (36.3 °C) (Oral) Resp 20 Ht 5' 11\" (1.803 m) Wt 120.7 kg (266 lb) SpO2 100% BMI 37.10 kg/m² Review of Systems: As above otherwise 11 point review of systems negative including;  
Constitutional no fever or chills Skin denies rash or itching HEENT  Denies tinnitus, hearing lose Eyes denies diplopia vision lose Respiratory denies sortness of breath Cardiovascular denies chest pain, dyspnea on exertion Gastrointestinal denies nausea, vomiting, diarrhea, constipation Genitourinary denies incontinence Musculoskeletal denies joint pain or swelling Endocrine denies weight change Hematology denies easy bruising or bleeding Neurological as above in HPI Patient Active Problem List  
Diagnosis Code  Diastolic HF (heart failure) ([facility]) I50.30  Atrial fibrillation ([facility]) I48.91  
 Essential hypertension I10  Tobacco use Z72.0  Shortness of breath R06.02  
 CHF (congestive heart failure) ([facility]) I50.9  Chronic combined systolic and diastolic congestive heart failure ([facility] [address] I50.42  
 Headache R51  Suspected cerebrovascular accident (CVA) R09.89  
 TIA (transient ischemic attack) G45.9  CVA (cerebral vascular accident) ([facility] [address] I63.9  Severe obesity ([facility]) E66.01  
 
 
 
Objective: The patient is awake, alert, and oriented x 4. Fund of knowledge is adequate. Speech is fluent and memory is intact. Cranial Nerves: II  Visual fields are full to confrontation. III, IV, VI  Extraocular movements are intact. There is no nystagmus. V  Facial sensation is intact to pinprick. VII  Face is symmetrical.  VIII - Hearing is present. IX, X, XII  Palate is symmetrical.   XI - Shoulder shrugging and head turning intact Motor: The patient moves all four limbs fairly well and symmetrically. Tone is normal. Reflexes are 2+ and symmetrical. Plantars are down going. Gait is abnormal, antalgic as a consequence of bilateral hip pain. [place] CBC:  
Lab Results Component Value Date/Time WBC 6.1 10/28/2018 01:41 AM  
 RBC 4.83 10/28/2018 01:41 AM  
 HGB 13.8 10/28/2018 01:41 AM  
 HCT 40.0 10/28/2018 01:41 AM  
 PLATELET 847 (L) 15/92/8486 01:41 AM  
 
BMP:  
Lab Results Component Value Date/Time Glucose 92 10/28/2018 01:41 AM  
 Sodium 141 10/28/2018 01:41 AM  
 Potassium 3.6 10/28/2018 01:41 AM  
 Chloride 108 10/28/2018 01:41 AM  
 CO2 27 10/28/2018 01:41 AM  
 BUN 11 10/28/2018 01:41 AM  
 Creatinine 1.04 10/28/2018 01:41 AM  
 Calcium 9.2 10/28/2018 01:41 AM  
 
CMP:  
Lab Results Component Value Date/Time  Glucose 92 10/28/2018 01:41 AM  
 Sodium 141 10/28/2018 01:41 AM  
 Potassium 3.6 10/28/2018 01:41 AM  
 Chloride 108 10/28/2018 01:41 AM  
 CO2 27 10/28/2018 01:41 AM  
 BUN 11 10/28/2018 01:41 AM  
 Creatinine 1.04 10/28/2018 01:41 AM  
 Calcium 9.2 10/28/2018 01:41 AM  
 Anion gap 6 10/28/2018 01:41 AM  
 BUN/Creatinine ratio 11 (L) 10/28/2018 01:41 AM  
 Alk. phosphatase 74 10/28/2018 01:41 AM  
 Protein, total 7.0 10/28/2018 01:41 AM  
 Albumin 3.3 (L) 10/28/2018 01:41 AM  
 Globulin 3.7 10/28/2018 01:41 AM  
 A-G Ratio 0.9 10/28/2018 01:41 AM  
 
Coagulation:  
Lab Results Component Value Date/Time Prothrombin time 13.7 10/26/2018 07:52 PM  
 INR 1.1 10/26/2018 07:52 PM  
 aPTT 30.0 10/28/2018 01:41 AM  
 
 
Assessment:  New stroke in this patient who has risk factors including hypertension, atrial fibrillation. Also smokes marijuana, which he will not stop smoking at this time. Plan:  Continue current medication including Xarelto, Lipitor and BP medication. Return for follow up routinely in 6 months. Sincerely, 
 
 
 
Cassandra Bolden M.D.

## 2018-12-03 ENCOUNTER — HOSPITAL ENCOUNTER (EMERGENCY)
Age: 58
Discharge: HOME | End: 2018-12-03
Attending: EMERGENCY MEDICINE
Payer: MEDICARE

## 2018-12-03 VITALS
HEIGHT: 71 IN | DIASTOLIC BLOOD PRESSURE: 90 MMHG | SYSTOLIC BLOOD PRESSURE: 137 MMHG | RESPIRATION RATE: 18 BRPM | HEART RATE: 72 BPM | WEIGHT: 260 LBS | TEMPERATURE: 98.1 F | OXYGEN SATURATION: 98 % | BODY MASS INDEX: 36.4 KG/M2

## 2018-12-03 DIAGNOSIS — R53.1 WEAKNESS: ICD-10-CM

## 2018-12-03 DIAGNOSIS — I48.21 PERMANENT ATRIAL FIBRILLATION (HCC): Primary | ICD-10-CM

## 2018-12-03 LAB
ALBUMIN SERPL-MCNC: 3.5 G/DL (ref 3.4–5)
ALBUMIN/GLOB SERPL: 0.9 {RATIO} (ref 0.8–1.7)
ALP SERPL-CCNC: 89 U/L (ref 45–117)
ALT SERPL-CCNC: 26 U/L (ref 16–61)
ANION GAP SERPL CALC-SCNC: 8 MMOL/L (ref 3–18)
APTT PPP: 32.5 SEC (ref 23–36.4)
AST SERPL-CCNC: 11 U/L (ref 15–37)
ATRIAL RATE: 75 BPM
BASOPHILS # BLD: 0 K/UL (ref 0–0.1)
BASOPHILS NFR BLD: 1 % (ref 0–2)
BILIRUB SERPL-MCNC: 0.6 MG/DL (ref 0.2–1)
BUN SERPL-MCNC: 19 MG/DL (ref 7–18)
BUN/CREAT SERPL: 17 (ref 12–20)
CALCIUM SERPL-MCNC: 9.9 MG/DL (ref 8.5–10.1)
CALCULATED R AXIS, ECG10: -3 DEGREES
CALCULATED T AXIS, ECG11: 9 DEGREES
CHLORIDE SERPL-SCNC: 109 MMOL/L (ref 100–108)
CO2 SERPL-SCNC: 25 MMOL/L (ref 21–32)
CREAT SERPL-MCNC: 1.12 MG/DL (ref 0.6–1.3)
DIAGNOSIS, 93000: NORMAL
DIFFERENTIAL METHOD BLD: ABNORMAL
EOSINOPHIL # BLD: 0.3 K/UL (ref 0–0.4)
EOSINOPHIL NFR BLD: 5 % (ref 0–5)
ERYTHROCYTE [DISTWIDTH] IN BLOOD BY AUTOMATED COUNT: 14.4 % (ref 11.6–14.5)
GLOBULIN SER CALC-MCNC: 3.8 G/DL (ref 2–4)
GLUCOSE SERPL-MCNC: 112 MG/DL (ref 74–99)
HCT VFR BLD AUTO: 41 % (ref 36–48)
HGB BLD-MCNC: 13.9 G/DL (ref 13–16)
INR PPP: 1.2 (ref 0.8–1.2)
LYMPHOCYTES # BLD: 2.3 K/UL (ref 0.9–3.6)
LYMPHOCYTES NFR BLD: 37 % (ref 21–52)
MCH RBC QN AUTO: 28.9 PG (ref 24–34)
MCHC RBC AUTO-ENTMCNC: 33.9 G/DL (ref 31–37)
MCV RBC AUTO: 85.2 FL (ref 74–97)
MONOCYTES # BLD: 0.5 K/UL (ref 0.05–1.2)
MONOCYTES NFR BLD: 8 % (ref 3–10)
NEUTS SEG # BLD: 3 K/UL (ref 1.8–8)
NEUTS SEG NFR BLD: 49 % (ref 40–73)
PLATELET # BLD AUTO: 121 K/UL (ref 135–420)
PMV BLD AUTO: 13.3 FL (ref 9.2–11.8)
POTASSIUM SERPL-SCNC: 4.1 MMOL/L (ref 3.5–5.5)
PROT SERPL-MCNC: 7.3 G/DL (ref 6.4–8.2)
PROTHROMBIN TIME: 14.6 SEC (ref 11.5–15.2)
Q-T INTERVAL, ECG07: 434 MS
QRS DURATION, ECG06: 104 MS
QTC CALCULATION (BEZET), ECG08: 418 MS
RBC # BLD AUTO: 4.81 M/UL (ref 4.7–5.5)
SODIUM SERPL-SCNC: 142 MMOL/L (ref 136–145)
VENTRICULAR RATE, ECG03: 56 BPM
WBC # BLD AUTO: 6.1 K/UL (ref 4.6–13.2)

## 2018-12-03 PROCEDURE — 99283 EMERGENCY DEPT VISIT LOW MDM: CPT

## 2018-12-03 PROCEDURE — 85610 PROTHROMBIN TIME: CPT

## 2018-12-03 PROCEDURE — 93005 ELECTROCARDIOGRAM TRACING: CPT

## 2018-12-03 PROCEDURE — 85025 COMPLETE CBC W/AUTO DIFF WBC: CPT

## 2018-12-03 PROCEDURE — 80053 COMPREHEN METABOLIC PANEL: CPT

## 2018-12-03 PROCEDURE — 85730 THROMBOPLASTIN TIME PARTIAL: CPT

## 2018-12-03 NOTE — ED PROVIDER NOTES
EMERGENCY DEPARTMENT HISTORY AND PHYSICAL EXAM 
 
11:01 AM 
 
 
Date: 12/3/2018 Patient Name: Lucienne Spatz History of Presenting Illness Chief Complaint Patient presents with  Dizziness History Provided By: Patient Chief Complaint: med refill Duration:  2 days Timing:  Constant Location: head Quality: n/a Severity: mild Modifying Factors: none Associated Symptoms: light headed Additional History (Context): Lucienne Spatz is a 60 y.o. male presents to the ED with c/o acute medication refill today. Patient admits he is out of his Xarelto for the past 2 days. Describes refill is costing him $500 currently and he is unable to afford it until 1/1/2019 \"I bought two that cost me $30.\" Associated symptoms include dizziness which he describes as \"pressure,\" denies room spinning. Denies vomiting or fever. Admits he has the rest of his daily medications. No modifying or aggravating factors were reported. No other symptoms or concerns were expressed. PCP: Francisco Rouse MD 
 
Current Outpatient Medications Medication Sig Dispense Refill  rivaroxaban (XARELTO) 20 mg tab tablet Take 1 Tab by mouth daily. 30 Tab 0  carvedilol (COREG) 25 mg tablet Take 1 Tab by mouth every twelve (12) hours. 60 Tab 6  furosemide (LASIX) 40 mg tablet Take 1 Tab by mouth daily. Or as directed 30 Tab 6  
 lisinopril (PRINIVIL, ZESTRIL) 20 mg tablet Take 1 Tab by mouth daily. 30 Tab 6  
 atorvastatin (LIPITOR) 40 mg tablet Take 1 Tab by mouth nightly. 30 Tab 6  
 nitroglycerin (NITROSTAT) 0.4 mg SL tablet 1 Tab by SubLINGual route every five (5) minutes as needed for Chest Pain. 1 Bottle 1  
 methocarbamol (ROBAXIN) 500 mg tablet Take 1 Tab by mouth four (4) times daily. 30 Tab 0 Past History Past Medical History: 
Past Medical History:  
Diagnosis Date  Atrial fibrillation ([facility] [address] 05/06/2015 Persistent  Diastolic HF (heart failure) ([facility] [address] EF 45-50% on echocardiogram 2/2018  History of echocardiogram 02/06/2018 LVE. EF 45-50%. No WMA. Mod-severe conc LVH. Indeterminate diastolic fx. RVSP at least 45 mmHg. Severe LAE. Mod ROSEMARIE. Mod MR. Mod TR. mild to moderate AI  
 Hypertension  Severe headache  SOB (shortness of breath) on exertion  Stomach pain  Tobacco use Past Surgical History: 
Past Surgical History:  
Procedure Laterality Date  HX HERNIA REPAIR    
 HX HIP REPLACEMENT Bilateral   
 right in March 2013, left May 2013 Family History: 
Family History Problem Relation Age of Onset  Heart Failure Mother  Obesity Mother  Diabetes Father Social History: 
Social History Tobacco Use  Smoking status: Current Every Day Smoker Packs/day: 0.50 Years: 40.00 Pack years: 20.00  Smokeless tobacco: Never Used  Tobacco comment: trying to quit Substance Use Topics  Alcohol use: No  
 Drug use: Yes Types: Marijuana Comment: daily Allergies: 
No Known Allergies Review of Systems Review of Systems Constitutional: Negative for diaphoresis and fever. HENT: Negative for congestion and sore throat. Eyes: Negative for pain and itching. Respiratory: Negative for cough and shortness of breath. Cardiovascular: Negative for chest pain and palpitations. Gastrointestinal: Negative for abdominal pain and diarrhea. Endocrine: Negative for polydipsia and polyuria. Genitourinary: Negative for dysuria and hematuria. Musculoskeletal: Negative for arthralgias and myalgias. Skin: Negative for rash and wound. Neurological: Negative for seizures and syncope. Hematological: Does not bruise/bleed easily. Psychiatric/Behavioral: Negative for agitation and hallucinations. All other systems reviewed and are negative. Physical Exam  
 
Patient Vitals for the past 12 hrs: 
 Temp Pulse Resp BP SpO2 12/03/18 1021 98.1 °F (36.7 °C) 72 18 137/90 98 % Physical Exam  
Constitutional: He appears well-developed and well-nourished. HENT:  
Head: Normocephalic and atraumatic. Eyes: Conjunctivae are normal. No scleral icterus. Neck: Normal range of motion. Neck supple. No JVD present. Cardiovascular: Normal rate, regular rhythm and normal heart sounds. 4 intact extremity pulses Pulmonary/Chest: Effort normal and breath sounds normal.  
Abdominal: Soft. He exhibits no mass. Musculoskeletal: Normal range of motion. Lymphadenopathy:  
  He has no cervical adenopathy. Neurological: He is alert. He has normal strength. He is not disoriented. No cranial nerve deficit or sensory deficit. Grossly intact. Skin: Skin is warm and dry. Nursing note and vitals reviewed. Diagnostic Study Results Labs - Recent Results (from the past 12 hour(s)) EKG, 12 LEAD, INITIAL Collection Time: 12/03/18 10:40 AM  
Result Value Ref Range Ventricular Rate 56 BPM  
 Atrial Rate 75 BPM  
 QRS Duration 104 ms Q-T Interval 434 ms QTC Calculation (Bezet) 418 ms Calculated R Axis -3 degrees Calculated T Axis 9 degrees Diagnosis Atrial fibrillation with slow ventricular response Moderate voltage criteria for LVH, may be normal variant Abnormal ECG When compared with ECG of 26-OCT-2018 19:39, No significant change was found CBC WITH AUTOMATED DIFF Collection Time: 12/03/18 10:45 AM  
Result Value Ref Range WBC 6.1 4.6 - 13.2 K/uL  
 RBC 4.81 4.70 - 5.50 M/uL  
 HGB 13.9 13.0 - 16.0 g/dL HCT 41.0 36.0 - 48.0 % MCV 85.2 74.0 - 97.0 FL  
 MCH 28.9 24.0 - 34.0 PG  
 MCHC 33.9 31.0 - 37.0 g/dL  
 RDW 14.4 11.6 - 14.5 % PLATELET 081 (L) 622 - 420 K/uL MPV 13.3 (H) 9.2 - 11.8 FL  
 NEUTROPHILS 49 40 - 73 % LYMPHOCYTES 37 21 - 52 % MONOCYTES 8 3 - 10 % EOSINOPHILS 5 0 - 5 % BASOPHILS 1 0 - 2 %  
 ABS. NEUTROPHILS 3.0 1.8 - 8.0 K/UL ABS. LYMPHOCYTES 2.3 0.9 - 3.6 K/UL  
 ABS. MONOCYTES 0.5 0.05 - 1.2 K/UL  
 ABS. EOSINOPHILS 0.3 0.0 - 0.4 K/UL  
 ABS. BASOPHILS 0.0 0.0 - 0.1 K/UL  
 DF AUTOMATED METABOLIC PANEL, COMPREHENSIVE Collection Time: 12/03/18 10:45 AM  
Result Value Ref Range Sodium 142 136 - 145 mmol/L Potassium 4.1 3.5 - 5.5 mmol/L Chloride 109 (H) 100 - 108 mmol/L  
 CO2 25 21 - 32 mmol/L Anion gap 8 3.0 - 18 mmol/L Glucose 112 (H) 74 - 99 mg/dL BUN 19 (H) 7.0 - 18 MG/DL Creatinine 1.12 0.6 - 1.3 MG/DL  
 BUN/Creatinine ratio 17 12 - 20 GFR est AA >60 >60 ml/min/1.73m2 GFR est non-AA >60 >60 ml/min/1.73m2 Calcium 9.9 8.5 - 10.1 MG/DL Bilirubin, total 0.6 0.2 - 1.0 MG/DL  
 ALT (SGPT) 26 16 - 61 U/L  
 AST (SGOT) 11 (L) 15 - 37 U/L Alk. phosphatase 89 45 - 117 U/L Protein, total 7.3 6.4 - 8.2 g/dL Albumin 3.5 3.4 - 5.0 g/dL Globulin 3.8 2.0 - 4.0 g/dL A-G Ratio 0.9 0.8 - 1.7 PROTHROMBIN TIME + INR Collection Time: 12/03/18 10:45 AM  
Result Value Ref Range Prothrombin time 14.6 11.5 - 15.2 sec INR 1.2 0.8 - 1.2 PTT Collection Time: 12/03/18 10:45 AM  
Result Value Ref Range aPTT 32.5 23.0 - 36.4 SEC Radiologic Studies - No orders to display No results found. Medications ordered:  
Medications - No data to display Medical Decision Making Initial Medical Decision Making and DDx: 
Pt cc/o needing xarelto which he cant afford. I have contacted case management in order to help the pt get his medications. He has a secondary c/o light headedness; will check labs. In no acute distress. Picture not consistent w/ stroke or intracranial lesion. ED Course: Progress Notes, Reevaluation, and Consults: 
 11:06 AM: Spoke to Jeanna Lisa, case management. Will evaluate patient. I am the first provider for this patient.  
 
I reviewed the vital signs, available nursing notes, past medical history, past surgical history, family history and social history. Vital Signs-Reviewed the patient's vital signs. Pulse Oximetry Analysis - 98% RA 
 
EKG: Interpreted by the EP. Time Interpreted: 2988 Rate: 56 Rhythm: Afib Interpretation: Nothing acute. Comparison: none Records Reviewed: Nursing Notes and Old Medical Records (Time of Review: 11:01 AM) Diagnosis Clinical Impression: 1. Permanent atrial fibrillation ([address] 2. Weakness Disposition: home Follow-up Information Follow up With Specialties Details Why Contact Info Beverley Eagle MD Internal Medicine In 2 days  [address] [facility] [id number] 
[phone] Medication List  
  
CONTINUE taking these medications   
rivaroxaban 20 mg Tab tablet Commonly known as:  Israel Nassar Take 1 Tab by mouth daily. ASK your doctor about these medications   
atorvastatin 40 mg tablet Commonly known as:  LIPITOR Take 1 Tab by mouth nightly. carvedilol 25 mg tablet Commonly known as:  Chanel Jenn Take 1 Tab by mouth every twelve (12) hours. furosemide 40 mg tablet Commonly known as:  LASIX Take 1 Tab by mouth daily. Or as directed 
  
lisinopril 20 mg tablet Commonly known as:  Toyin Sprague Take 1 Tab by mouth daily. methocarbamol 500 mg tablet Commonly known as:  ROBAXIN Take 1 Tab by mouth four (4) times daily. nitroglycerin 0.4 mg SL tablet Commonly known as:  NITROSTAT 
1 Tab by SubLINGual route every five (5) minutes as needed for Chest Pain. Where to Get Your Medications Information about where to get these medications is not yet available Ask your nurse or doctor about these medications · rivaroxaban 20 mg Tab tablet 
  
 
_______________________________ Attestations: 
Scribe Attestation Lashell Lyles and Rd López acting as a scribe for and in the presence of Maribeth Vasquez MD     
December 03, 2018 at 11:01 AM 
    
 Provider Attestation:     
I personally performed the services described in the documentation, reviewed the documentation, as recorded by the scribe in my presence, and it accurately and completely records my words and actions. December 03, 2018 at 11:01 AM - Neha Rodarte MD   
_______________________________

## 2018-12-03 NOTE — DISCHARGE INSTRUCTIONS

## 2018-12-03 NOTE — PROGRESS NOTES
Met with pt. Pt states his Part D insurance will become active on Jan 1. Needs assistance with Xarelto. Medication to be filled at outpt Rx for pt this time. Pt understands he will need to use his Part D on Jan 1. 2021 Maria Elena Springer, [phone]

## 2018-12-03 NOTE — ED TRIAGE NOTES
The patient presents for evaluation of dizziness. States, \" I'm taking Xarelto and I ran out two days ago. The medicine cost $500 and I can't afford it. \"

## 2020-02-04 ENCOUNTER — APPOINTMENT (OUTPATIENT)
Dept: GENERAL RADIOLOGY | Age: 60
End: 2020-02-04
Attending: EMERGENCY MEDICINE
Payer: MEDICARE

## 2020-02-04 ENCOUNTER — HOSPITAL ENCOUNTER (EMERGENCY)
Age: 60
Discharge: HOME | End: 2020-02-04
Attending: EMERGENCY MEDICINE
Payer: MEDICARE

## 2020-02-04 ENCOUNTER — APPOINTMENT (OUTPATIENT)
Dept: CT IMAGING | Age: 60
End: 2020-02-04
Attending: EMERGENCY MEDICINE
Payer: MEDICARE

## 2020-02-04 VITALS
HEART RATE: 70 BPM | RESPIRATION RATE: 16 BRPM | SYSTOLIC BLOOD PRESSURE: 149 MMHG | TEMPERATURE: 97.1 F | DIASTOLIC BLOOD PRESSURE: 85 MMHG | OXYGEN SATURATION: 100 %

## 2020-02-04 DIAGNOSIS — F19.10 POLYSUBSTANCE ABUSE (HCC): ICD-10-CM

## 2020-02-04 DIAGNOSIS — T50.901A ACCIDENTAL DRUG OVERDOSE, INITIAL ENCOUNTER: Primary | ICD-10-CM

## 2020-02-04 LAB
ALBUMIN SERPL-MCNC: 3.4 G/DL (ref 3.4–5)
ALBUMIN/GLOB SERPL: 0.9 {RATIO} (ref 0.8–1.7)
ALP SERPL-CCNC: 83 U/L (ref 45–117)
ALT SERPL-CCNC: 26 U/L (ref 16–61)
AMPHET UR QL SCN: NEGATIVE
ANION GAP SERPL CALC-SCNC: 10 MMOL/L (ref 3–18)
APAP SERPL-MCNC: <2 UG/ML (ref 10–30)
AST SERPL-CCNC: 13 U/L (ref 10–38)
BARBITURATES UR QL SCN: NEGATIVE
BASOPHILS # BLD: 0 K/UL (ref 0–0.1)
BASOPHILS NFR BLD: 0 % (ref 0–2)
BENZODIAZ UR QL: NEGATIVE
BILIRUB SERPL-MCNC: 0.5 MG/DL (ref 0.2–1)
BUN SERPL-MCNC: 15 MG/DL (ref 7–18)
BUN/CREAT SERPL: 14 (ref 12–20)
CALCIUM SERPL-MCNC: 9.4 MG/DL (ref 8.5–10.1)
CANNABINOIDS UR QL SCN: POSITIVE
CHLORIDE SERPL-SCNC: 112 MMOL/L (ref 100–111)
CK MB CFR SERPL CALC: 1.7 % (ref 0–4)
CK MB SERPL-MCNC: 2.2 NG/ML (ref 5–25)
CK SERPL-CCNC: 133 U/L (ref 39–308)
CO2 SERPL-SCNC: 23 MMOL/L (ref 21–32)
COCAINE UR QL SCN: NEGATIVE
CREAT SERPL-MCNC: 1.1 MG/DL (ref 0.6–1.3)
DIFFERENTIAL METHOD BLD: ABNORMAL
EOSINOPHIL # BLD: 0.3 K/UL (ref 0–0.4)
EOSINOPHIL NFR BLD: 4 % (ref 0–5)
ERYTHROCYTE [DISTWIDTH] IN BLOOD BY AUTOMATED COUNT: 16.2 % (ref 11.6–14.5)
ETHANOL SERPL-MCNC: <3 MG/DL (ref 0–3)
GLOBULIN SER CALC-MCNC: 3.8 G/DL (ref 2–4)
GLUCOSE SERPL-MCNC: 108 MG/DL (ref 74–99)
HCT VFR BLD AUTO: 39.1 % (ref 36–48)
HDSCOM,HDSCOM: ABNORMAL
HGB BLD-MCNC: 13.1 G/DL (ref 13–16)
LYMPHOCYTES # BLD: 2.6 K/UL (ref 0.9–3.6)
LYMPHOCYTES NFR BLD: 35 % (ref 21–52)
MCH RBC QN AUTO: 28.4 PG (ref 24–34)
MCHC RBC AUTO-ENTMCNC: 33.5 G/DL (ref 31–37)
MCV RBC AUTO: 84.8 FL (ref 74–97)
METHADONE UR QL: NEGATIVE
MONOCYTES # BLD: 0.7 K/UL (ref 0.05–1.2)
MONOCYTES NFR BLD: 9 % (ref 3–10)
NEUTS SEG # BLD: 3.9 K/UL (ref 1.8–8)
NEUTS SEG NFR BLD: 52 % (ref 40–73)
OPIATES UR QL: POSITIVE
PCP UR QL: NEGATIVE
PLATELET # BLD AUTO: 110 K/UL (ref 135–420)
POTASSIUM SERPL-SCNC: 3.8 MMOL/L (ref 3.5–5.5)
PROT SERPL-MCNC: 7.2 G/DL (ref 6.4–8.2)
RBC # BLD AUTO: 4.61 M/UL (ref 4.7–5.5)
SALICYLATES SERPL-MCNC: 3.1 MG/DL (ref 2.8–20)
SODIUM SERPL-SCNC: 145 MMOL/L (ref 136–145)
TROPONIN I SERPL-MCNC: 0.02 NG/ML (ref 0–0.04)
WBC # BLD AUTO: 7.5 K/UL (ref 4.6–13.2)

## 2020-02-04 PROCEDURE — 80053 COMPREHEN METABOLIC PANEL: CPT

## 2020-02-04 PROCEDURE — 80307 DRUG TEST PRSMV CHEM ANLYZR: CPT

## 2020-02-04 PROCEDURE — 71045 X-RAY EXAM CHEST 1 VIEW: CPT

## 2020-02-04 PROCEDURE — 93005 ELECTROCARDIOGRAM TRACING: CPT

## 2020-02-04 PROCEDURE — 85025 COMPLETE CBC W/AUTO DIFF WBC: CPT

## 2020-02-04 PROCEDURE — 99284 EMERGENCY DEPT VISIT MOD MDM: CPT

## 2020-02-04 PROCEDURE — 82550 ASSAY OF CK (CPK): CPT

## 2020-02-04 PROCEDURE — 70450 CT HEAD/BRAIN W/O DYE: CPT

## 2020-02-04 NOTE — DISCHARGE INSTRUCTIONS
Patient Education        Accidental Overdose of Medicine: Care Instructions  Your Care Instructions    Almost any medicine can cause harm if you take too much of it. You have had treatment to help your body get rid of an overdose of a medicine. This may have been an over-the-counter medicine. Or it might be one that a doctor prescribed. It may even have been a vitamin or a supplement. During treatment, the doctor may have given you fluids and medicine. You also may have had lab tests. Then the doctor made sure that you were well enough to go home. The doctor has checked you carefully, but problems can develop later. If you notice any problems or new symptoms, get medical treatment right away. Follow-up care is a key part of your treatment and safety. Be sure to make and go to all appointments, and call your doctor if you are having problems. It's also a good idea to know your test results and keep a list of the medicines you take. How can you care for yourself at home? Home care  · Talk with your doctor about what you should eat or drink. · If you normally take medicines, ask your doctor when you can start taking them again. · Read the information that comes with any medicine. If you have questions, ask your doctor or pharmacist.  Prevention  · Be safe with medicines. Take your medicines exactly as prescribed or as the label directs. Call your doctor if you think you are having a problem with your medicine. · Keep your medicines in the containers they came in. Keep them with the original labels. · Find out what to do if you miss a dose of your medicine. When should you call for help? Poison control centers, hospitals, or your doctor can give immediate advice in the case of a poisoning. The [employer] number is [phone]. Have the poison container with you so you can give complete information to the poison control center.  This includes what the poison or substance is, when it was taken, and how much was taken. Do not try to make the person vomit.   Call 911 anytime you think you may need emergency care. For example, call if you or someone else:    · Has used or currently uses alcohol or drugs and is very confused or can't stay awake.     · Has passed out (lost consciousness).     · Has severe trouble breathing.     · Is having a seizure.    Call your doctor now or seek immediate medical care if:    · You are vomiting.     · You have a new or worse headache.     · You are dizzy or lightheaded or feel like you may faint.    Watch closely for changes in your health, and be sure to contact your doctor if:    · You do not get better as expected. Where can you learn more? Go to http://aixa-lizet.info/. Enter C165 in the search box to learn more about \"Accidental Overdose of Medicine: Care Instructions. \"  Current as of: December 13, 2018  Content Version: 12.2  © 5830-2888 [employer]. Care instructions adapted under license by [employer] (which disclaims liability or warranty for this information). If you have questions about a medical condition or this instruction, always ask your healthcare professional. [address] any warranty or liability for your use of this information.
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
## 2020-02-05 LAB
ATRIAL RATE: 500 BPM
CALCULATED R AXIS, ECG10: 16 DEGREES
CALCULATED T AXIS, ECG11: 72 DEGREES
DIAGNOSIS, 93000: NORMAL
Q-T INTERVAL, ECG07: 432 MS
QRS DURATION, ECG06: 116 MS
QTC CALCULATION (BEZET), ECG08: 432 MS
VENTRICULAR RATE, ECG03: 60 BPM
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
## 2020-06-14 ENCOUNTER — APPOINTMENT (OUTPATIENT)
Dept: CT IMAGING | Age: 60
End: 2020-06-14
Attending: EMERGENCY MEDICINE
Payer: MEDICARE

## 2020-06-14 ENCOUNTER — HOSPITAL ENCOUNTER (EMERGENCY)
Age: 60
Discharge: HOME | End: 2020-06-14
Attending: EMERGENCY MEDICINE
Payer: MEDICARE

## 2020-06-14 VITALS
TEMPERATURE: 99 F | OXYGEN SATURATION: 95 % | HEART RATE: 74 BPM | DIASTOLIC BLOOD PRESSURE: 85 MMHG | WEIGHT: 260 LBS | HEIGHT: 74 IN | BODY MASS INDEX: 33.37 KG/M2 | RESPIRATION RATE: 22 BRPM | SYSTOLIC BLOOD PRESSURE: 181 MMHG

## 2020-06-14 DIAGNOSIS — T40.601A OPIATE OVERDOSE, ACCIDENTAL OR UNINTENTIONAL, INITIAL ENCOUNTER (HCC): Primary | ICD-10-CM

## 2020-06-14 LAB
AMPHET UR QL SCN: NEGATIVE
ANION GAP SERPL CALC-SCNC: 9 MMOL/L (ref 3–18)
APAP SERPL-MCNC: <2 UG/ML (ref 10–30)
ATRIAL RATE: 277 BPM
BARBITURATES UR QL SCN: NEGATIVE
BASOPHILS # BLD: 0 K/UL (ref 0–0.1)
BASOPHILS NFR BLD: 0 % (ref 0–2)
BENZODIAZ UR QL: NEGATIVE
BUN SERPL-MCNC: 17 MG/DL (ref 7–18)
BUN/CREAT SERPL: 15 (ref 12–20)
CALCIUM SERPL-MCNC: 10.2 MG/DL (ref 8.5–10.1)
CALCULATED R AXIS, ECG10: 33 DEGREES
CALCULATED T AXIS, ECG11: 81 DEGREES
CANNABINOIDS UR QL SCN: POSITIVE
CHLORIDE SERPL-SCNC: 114 MMOL/L (ref 100–111)
CO2 SERPL-SCNC: 23 MMOL/L (ref 21–32)
COCAINE UR QL SCN: NEGATIVE
CREAT SERPL-MCNC: 1.17 MG/DL (ref 0.6–1.3)
DIAGNOSIS, 93000: NORMAL
DIFFERENTIAL METHOD BLD: ABNORMAL
EOSINOPHIL # BLD: 0.3 K/UL (ref 0–0.4)
EOSINOPHIL NFR BLD: 4 % (ref 0–5)
ERYTHROCYTE [DISTWIDTH] IN BLOOD BY AUTOMATED COUNT: 17.4 % (ref 11.6–14.5)
ETHANOL SERPL-MCNC: <3 MG/DL (ref 0–3)
GLUCOSE BLD STRIP.AUTO-MCNC: 137 MG/DL (ref 70–110)
GLUCOSE SERPL-MCNC: 148 MG/DL (ref 74–99)
HCT VFR BLD AUTO: 44.3 % (ref 36–48)
HDSCOM,HDSCOM: ABNORMAL
HGB BLD-MCNC: 14.8 G/DL (ref 13–16)
INR PPP: 2.9 (ref 0.8–1.2)
LYMPHOCYTES # BLD: 3.1 K/UL (ref 0.9–3.6)
LYMPHOCYTES NFR BLD: 37 % (ref 21–52)
MCH RBC QN AUTO: 28.2 PG (ref 24–34)
MCHC RBC AUTO-ENTMCNC: 33.4 G/DL (ref 31–37)
MCV RBC AUTO: 84.5 FL (ref 74–97)
METHADONE UR QL: NEGATIVE
MONOCYTES # BLD: 0.7 K/UL (ref 0.05–1.2)
MONOCYTES NFR BLD: 8 % (ref 3–10)
NEUTS SEG # BLD: 4.3 K/UL (ref 1.8–8)
NEUTS SEG NFR BLD: 51 % (ref 40–73)
OPIATES UR QL: POSITIVE
PCP UR QL: NEGATIVE
PLATELET # BLD AUTO: 123 K/UL (ref 135–420)
PLATELET COMMENTS,PCOM: ABNORMAL
POTASSIUM SERPL-SCNC: 3.8 MMOL/L (ref 3.5–5.5)
PROTHROMBIN TIME: 30.4 SEC (ref 11.5–15.2)
Q-T INTERVAL, ECG07: 402 MS
QRS DURATION, ECG06: 118 MS
QTC CALCULATION (BEZET), ECG08: 466 MS
RBC # BLD AUTO: 5.24 M/UL (ref 4.7–5.5)
RBC MORPH BLD: ABNORMAL
SALICYLATES SERPL-MCNC: 3.5 MG/DL (ref 2.8–20)
SODIUM SERPL-SCNC: 146 MMOL/L (ref 136–145)
VENTRICULAR RATE, ECG03: 81 BPM
WBC # BLD AUTO: 8.4 K/UL (ref 4.6–13.2)

## 2020-06-14 PROCEDURE — 70450 CT HEAD/BRAIN W/O DYE: CPT

## 2020-06-14 PROCEDURE — 93005 ELECTROCARDIOGRAM TRACING: CPT

## 2020-06-14 PROCEDURE — 82962 GLUCOSE BLOOD TEST: CPT

## 2020-06-14 PROCEDURE — 85610 PROTHROMBIN TIME: CPT

## 2020-06-14 PROCEDURE — 85025 COMPLETE CBC W/AUTO DIFF WBC: CPT

## 2020-06-14 PROCEDURE — 99285 EMERGENCY DEPT VISIT HI MDM: CPT

## 2020-06-14 PROCEDURE — 80307 DRUG TEST PRSMV CHEM ANLYZR: CPT

## 2020-06-14 PROCEDURE — 80048 BASIC METABOLIC PNL TOTAL CA: CPT

## 2020-06-14 NOTE — ED PROVIDER NOTES
EMERGENCY DEPARTMENT HISTORY AND PHYSICAL EXAM    9:51 AM      Date: 6/14/2020  Patient Name: Asya Ruiz    History of Presenting Illness     Chief Complaint   Patient presents with    Altered mental status         History Provided By: EMS    Additional History (Context): Asya Ruiz is a 63 y.o. male with hypertension and heart failure, atrial fibrillation  who presents via EMS for being combative and OD. Pt has multiple drugs on his person. EMS gave him narcan 2mg and reported pin point pupils. Blood glucose was 168. PCP: Queen Hollie MD      Current Facility-Administered Medications   Medication Dose Route Frequency Provider Last Rate Last Dose    sodium chloride 0.9 % bolus infusion 1,000 mL  1,000 mL IntraVENous ONCE Fernando Bonilla DO         Current Outpatient Medications   Medication Sig Dispense Refill    rivaroxaban (XARELTO) 20 mg tab tablet Take 1 Tab by mouth daily. 30 Tab 0    carvedilol (COREG) 25 mg tablet Take 1 Tab by mouth every twelve (12) hours. 60 Tab 6    furosemide (LASIX) 40 mg tablet Take 1 Tab by mouth daily. Or as directed 30 Tab 6    lisinopril (PRINIVIL, ZESTRIL) 20 mg tablet Take 1 Tab by mouth daily. 30 Tab 6    atorvastatin (LIPITOR) 40 mg tablet Take 1 Tab by mouth nightly. 30 Tab 6    nitroglycerin (NITROSTAT) 0.4 mg SL tablet 1 Tab by SubLINGual route every five (5) minutes as needed for Chest Pain. 1 Bottle 1    methocarbamol (ROBAXIN) 500 mg tablet Take 1 Tab by mouth four (4) times daily. 30 Tab 0       Past History     Past Medical History:  Past Medical History:   Diagnosis Date    Atrial fibrillation ([address] 05/06/2015    Persistent    Diastolic HF (heart failure) (HCC)     EF 45-50% on echocardiogram 2/2018    History of echocardiogram 02/06/2018    LVE. EF 45-50%. No WMA. Mod-severe conc LVH. Indeterminate diastolic fx. RVSP at least 45 mmHg. Severe LAE. Mod ROSEMARIE. Mod MR.    Mod TR. mild to moderate AI    Hypertension     Severe headache     SOB (shortness of breath) on exertion     Stomach pain     Tobacco use        Past Surgical History:  Past Surgical History:   Procedure Laterality Date    HX HERNIA REPAIR      HX HIP REPLACEMENT Bilateral     right in March 2013, left May 2013       Family History:  Family History   Problem Relation Age of Onset    Heart Failure Mother     Obesity Mother     Diabetes Father        Social History:  Social History     Tobacco Use    Smoking status: Current Every Day Smoker     Packs/day: 0.50     Years: 40.00     Pack years: 20.00    Smokeless tobacco: Never Used    Tobacco comment: trying to quit   Substance Use Topics    Alcohol use: No    Drug use: Yes     Types: Marijuana     Comment: daily       Allergies:  No Known Allergies      Review of Systems       Review of Systems   Unable to perform ROS: Acuity of condition         Physical Exam     Visit Vitals  BP (!) 158/98   Pulse 70   Temp 99 °F (37.2 °C)   Resp 18   Ht 6' 2\" (1.88 m)   Wt 117.9 kg (260 lb)   SpO2 98%   BMI 33.38 kg/m²         Physical Exam  Vitals signs and nursing note reviewed. HENT:      Nose: Nose normal.   Eyes:      Comments:  pinpoint   Neck:      Musculoskeletal: Normal range of motion. Cardiovascular:      Rate and Rhythm: Normal rate and regular rhythm. Pulmonary:      Effort: Pulmonary effort is normal.      Breath sounds: Normal breath sounds. Abdominal:      Palpations: Abdomen is soft. Musculoskeletal: Normal range of motion. Neurological:      Mental Status: He is disoriented. GCS: GCS eye subscore is 4. GCS verbal subscore is 4. GCS motor subscore is 5.            Diagnostic Study Results     Labs -  Recent Results (from the past 12 hour(s))   GLUCOSE, POC    Collection Time: 06/14/20  9:45 AM   Result Value Ref Range    Glucose (POC) 137 (H) 70 - 110 mg/dL   CBC WITH AUTOMATED DIFF    Collection Time: 06/14/20  9:53 AM   Result Value Ref Range    WBC 8.4 4.6 - 13.2 K/uL    RBC 5.24 4.70 - 5.50 M/uL    HGB 14.8 13.0 - 16.0 g/dL    HCT 44.3 36.0 - 48.0 %    MCV 84.5 74.0 - 97.0 FL    MCH 28.2 24.0 - 34.0 PG    MCHC 33.4 31.0 - 37.0 g/dL    RDW 17.4 (H) 11.6 - 14.5 %    PLATELET 300 (L) 518 - 420 K/uL    NEUTROPHILS 51 40 - 73 %    LYMPHOCYTES 37 21 - 52 %    MONOCYTES 8 3 - 10 %    EOSINOPHILS 4 0 - 5 %    BASOPHILS 0 0 - 2 %    ABS. NEUTROPHILS 4.3 1.8 - 8.0 K/UL    ABS. LYMPHOCYTES 3.1 0.9 - 3.6 K/UL    ABS. MONOCYTES 0.7 0.05 - 1.2 K/UL    ABS. EOSINOPHILS 0.3 0.0 - 0.4 K/UL    ABS.  BASOPHILS 0.0 0.0 - 0.1 K/UL    PLATELET COMMENTS LARGE PLATELETS      RBC COMMENTS ANISOCYTOSIS  1+        RBC COMMENTS POIKILOCYTOSIS  1+        RBC COMMENTS TARGET CELLS  1+        DF AUTOMATED     METABOLIC PANEL, BASIC    Collection Time: 06/14/20  9:53 AM   Result Value Ref Range    Sodium 146 (H) 136 - 145 mmol/L    Potassium 3.8 3.5 - 5.5 mmol/L    Chloride 114 (H) 100 - 111 mmol/L    CO2 23 21 - 32 mmol/L    Anion gap 9 3.0 - 18 mmol/L    Glucose 148 (H) 74 - 99 mg/dL    BUN 17 7.0 - 18 MG/DL    Creatinine 1.17 0.6 - 1.3 MG/DL    BUN/Creatinine ratio 15 12 - 20      GFR est AA >60 >60 ml/min/1.73m2    GFR est non-AA >60 >60 ml/min/1.73m2    Calcium 10.2 (H) 8.5 - 70.6 MG/DL   SALICYLATE    Collection Time: 06/14/20  9:53 AM   Result Value Ref Range    Salicylate level 3.5 2.8 - 20.0 MG/DL   ETHYL ALCOHOL    Collection Time: 06/14/20  9:53 AM   Result Value Ref Range    ALCOHOL(ETHYL),SERUM <3 0 - 3 MG/DL   ACETAMINOPHEN    Collection Time: 06/14/20  9:53 AM   Result Value Ref Range    Acetaminophen level <2 (L) 10.0 - 30.0 ug/mL   EKG, 12 LEAD, SUBSEQUENT    Collection Time: 06/14/20 10:06 AM   Result Value Ref Range    Ventricular Rate 81 BPM    Atrial Rate 277 BPM    QRS Duration 118 ms    Q-T Interval 402 ms    QTC Calculation (Bezet) 466 ms    Calculated R Axis 33 degrees    Calculated T Axis 81 degrees    Diagnosis       Atrial fibrillation with premature ventricular or aberrantly conducted complexes  Left ventricular hypertrophy with QRS widening  Nonspecific ST and T wave abnormality  Abnormal ECG  When compared with ECG of 04-FEB-2020 15:10,  No significant change was found     DRUG SCREEN, URINE    Collection Time: 06/14/20 12:25 PM   Result Value Ref Range    BENZODIAZEPINES Negative NEG      BARBITURATES Negative NEG      THC (TH-CANNABINOL) Positive (A) NEG      OPIATES Positive (A) NEG      PCP(PHENCYCLIDINE) Negative NEG      COCAINE Negative NEG      AMPHETAMINES Negative NEG      METHADONE Negative NEG      HDSCOM (NOTE)    PROTHROMBIN TIME + INR    Collection Time: 06/14/20  1:05 PM   Result Value Ref Range    Prothrombin time 30.4 (H) 11.5 - 15.2 sec    INR 2.9 (H) 0.8 - 1.2         Radiologic Studies -   CT HEAD WO CONT   Final Result   Impression:       No CT evidence of acute intracranial pathology. Chronic right MCA territory infarct again seen. Mild sinus mucosal disease. Medical Decision Making   Provider Notes (Medical Decision Making):  MDM  Number of Diagnoses or Management Options  Opiate overdose, accidental or unintentional, initial encounter [facility]):   Diagnosis management comments: Overdose  CVA  TIA  Seizure        I am the first provider for this patient. I reviewed the vital signs, available nursing notes, past medical history, past surgical history, family history and social history. Vital Signs-Reviewed the patient's vital signs. Records Reviewed: Nursing Notes (Time of Review: 9:51 AM)    ED Course: Progress Notes, Reevaluation, and Consults:    Labs essentially normal.  UDS positive for opiates and THC. Alcohol negative. Acetaminophen and salicylate levels normal.  EKG showed atrial fibrillation at a rate of 81 bpm. With no ST elevations or depression and non specific T wave changes. CT head showed no acute process. 1:51 PM 6/14/2020    Discussed case with wife. Patient now feeling much better. Will discharge home.     Diagnosis I have reassessed the patient. Patient was discharged in stable condition. Patient is to return to emergency department if any new or worsening condition. Clinical Impression:   1. Opiate overdose, accidental or unintentional, initial encounter [facility])        Disposition: Discharged home    Follow-up Information     Follow up With Specialties Details Why Contact Info    Sheyla Bravo MD Internal Medicine In 2 days  [address] [facility]  [phone]               Attestation        Provider Attestation:     I personally performed the services described in the documentation, reviewed the documentation and it accurately and completely records my words and actions utilizing the [address] [place] June 14, 2020 at 2:43 PM - Lavinia Bonilla DO    Disclaimer. It is dictated using utilizing voice recognition software. Unfortunately this leads to occasional typographical errors. I apologize in advance if the situation occurs. If questions arise please do not hesitate to contact me or call our department.

## 2020-06-14 NOTE — DISCHARGE INSTRUCTIONS
Patient Education        Alcohol, Drug, or Poison Ingestion: Care Instructions  Your Care Instructions     A person can become very sick, or die, from swallowing or using alcohol, drugs, or poisons. Alcohol poisoning occurs when a person drinks a large amount of alcohol. Alcohol can stop nerve signals that control breathing. It can also stop the gag reflex that prevents choking. Alcohol poisoning is serious. It can lead to brain damage or death if it's not treated right away. Drugs can be used by accident or on purpose. They can be swallowed, inhaled, injected, or absorbed through the skin. Drugs include over-the-counter medicine (such as aspirin or acetaminophen) and prescription medicine. They also include vitamins and supplements. And they include illegal drugs such as cocaine and heroin. And poisons are all around us. They include household , cosmetics, houseplants, and garden chemicals. The doctor has checked you carefully, but problems can develop later. If you notice any problems or new symptoms, get medical treatment right away. Follow-up care is a key part of your treatment and safety. Be sure to make and go to all appointments, and call your doctor if you are having problems. It's also a good idea to know your test results and keep a list of the medicines you take. How can you care for yourself at home? Alcohol problems  · Talk to your doctor or counselor about programs that can help you stop using alcohol. · Plan ways to avoid being tempted to drink. ? Get rid of all alcohol in your home. ? Avoid places where you tend to drink. ? Stay away from places or events that offer alcohol. ? Stay away from people who drink a lot. Drug problems  · Talk to your doctor about programs that can help you stop using drugs. · Get rid of any drugs you might be tempted to misuse. · Learn how to say no when other people use drugs. · Don't spend time with people who use drugs.   Poison prevention  · Keep products in the containers they came in. Keep them with the original labels. · Be careful when you use cleaning products, paints, solvents, and pesticides. Read labels before use. Use a fan to move strong odors and fumes out of your home. · Do not mix cleaning products. Try to use nontoxic . These include vinegar, lemon juice, and baking soda. When should you call for help? Poison control centers, hospitals, or your doctor can give immediate advice in the case of a poisoning. The [facility] number is [phone]. Have the poison container with you so you can give complete information to the poison control center, such as what the poison or substance is, how much was taken and when. Do not try to make the person vomit. [id number] anytime you think you may need emergency care. For example, call if you or someone else:  · Has used or currently uses alcohol or drugs and is very confused or can't stay awake. · Has passed out (lost consciousness). · Has severe trouble breathing. · Is having a seizure. Call your doctor now or seek immediate medical care if you or someone else:  · Has new symptoms, or is not acting normally. Watch closely for changes in your health, and be sure to contact your doctor if:  · You do not get better as expected. · You need help with drug or alcohol problems. · You have problems with depression or other mental health issues. Where can you learn more? Go to http://aixa-lizet.info/  Enter A385 in the search box to learn more about \"Alcohol, Drug, or Poison Ingestion: Care Instructions. \"  Current as of: June 26, 2019               Content Version: 12.5  © 2682-0955 [employer]. Care instructions adapted under license by [employer] (which disclaims liability or warranty for this information).  If you have questions about a medical condition or this instruction, always ask your healthcare professional. [place] [address] any warranty or liability for your use of this information.

## 2020-06-14 NOTE — ED TRIAGE NOTES
Patient was found unresponsive by wife. Upon EMS arrival pt still unresponsive and was given Narcan nasally. Pt more responsive but became more combative. Pt arrived to ER restrained and uncooperative. [facility]  [address]  [phone]               Thank you for choosing us for your health care visit with [facility].   We are glad to serve you and happy to provide you with this summary of Inhale 2 puffs into the lungs every 6 (six) hours as needed for Shortness of Breath. AmLODIPine Besylate 10 MG Tabs   Take 10 mg by mouth daily.  Indications: High Blood Pressure   Commonly known as:  NORVASC           aspirin EC 81 MG Tbec   Take Commonly known as:  COUMADIN                   MyChart     Visit MyChart  You can access your MyChart to more actively manage your health care and view more details from this visit by going to [email or web address].   If you've recently had a stay at

## 2020-11-26 ENCOUNTER — APPOINTMENT (OUTPATIENT)
Dept: GENERAL RADIOLOGY | Age: 60
End: 2020-11-26
Attending: EMERGENCY MEDICINE
Payer: MEDICARE

## 2020-11-26 ENCOUNTER — HOSPITAL ENCOUNTER (EMERGENCY)
Age: 60
Discharge: HOME | End: 2020-11-26
Attending: EMERGENCY MEDICINE
Payer: MEDICARE

## 2020-11-26 ENCOUNTER — APPOINTMENT (OUTPATIENT)
Dept: CT IMAGING | Age: 60
End: 2020-11-26
Attending: EMERGENCY MEDICINE
Payer: MEDICARE

## 2020-11-26 VITALS
DIASTOLIC BLOOD PRESSURE: 69 MMHG | OXYGEN SATURATION: 100 % | HEART RATE: 68 BPM | SYSTOLIC BLOOD PRESSURE: 137 MMHG | TEMPERATURE: 98.7 F | RESPIRATION RATE: 24 BRPM

## 2020-11-26 DIAGNOSIS — R40.4 TRANSIENT ALTERATION OF AWARENESS: Primary | ICD-10-CM

## 2020-11-26 LAB
ALBUMIN SERPL-MCNC: 3.5 G/DL (ref 3.4–5)
ALBUMIN/GLOB SERPL: 0.8 {RATIO} (ref 0.8–1.7)
ALP SERPL-CCNC: 94 U/L (ref 45–117)
ALT SERPL-CCNC: 34 U/L (ref 16–61)
ANION GAP SERPL CALC-SCNC: 6 MMOL/L (ref 3–18)
AST SERPL-CCNC: 27 U/L (ref 10–38)
ATRIAL RATE: 227 BPM
BASOPHILS # BLD: 0 K/UL (ref 0–0.06)
BASOPHILS NFR BLD: 0 % (ref 0–3)
BILIRUB SERPL-MCNC: 0.6 MG/DL (ref 0.2–1)
BUN SERPL-MCNC: 26 MG/DL (ref 7–18)
BUN/CREAT SERPL: 14 (ref 12–20)
CALCIUM SERPL-MCNC: 10.4 MG/DL (ref 8.5–10.1)
CALCULATED R AXIS, ECG10: -3 DEGREES
CALCULATED T AXIS, ECG11: 74 DEGREES
CHLORIDE SERPL-SCNC: 111 MMOL/L (ref 100–111)
CK MB CFR SERPL CALC: 1.6 % (ref 0–4)
CK MB SERPL-MCNC: 2.4 NG/ML (ref 5–25)
CK SERPL-CCNC: 146 U/L (ref 39–308)
CO2 SERPL-SCNC: 25 MMOL/L (ref 21–32)
CREAT SERPL-MCNC: 1.8 MG/DL (ref 0.6–1.3)
DIAGNOSIS, 93000: NORMAL
DIFFERENTIAL METHOD BLD: ABNORMAL
EOSINOPHIL # BLD: 0.3 K/UL (ref 0–0.4)
EOSINOPHIL NFR BLD: 3 % (ref 0–5)
ERYTHROCYTE [DISTWIDTH] IN BLOOD BY AUTOMATED COUNT: 16.4 % (ref 11.6–14.5)
GLOBULIN SER CALC-MCNC: 4.2 G/DL (ref 2–4)
GLUCOSE SERPL-MCNC: 111 MG/DL (ref 74–99)
HCT VFR BLD AUTO: 39.7 % (ref 36–48)
HGB BLD-MCNC: 13.5 G/DL (ref 13–16)
LYMPHOCYTES # BLD: 2.9 K/UL (ref 0.8–3.5)
LYMPHOCYTES NFR BLD: 34 % (ref 20–51)
MCH RBC QN AUTO: 29.2 PG (ref 24–34)
MCHC RBC AUTO-ENTMCNC: 34 G/DL (ref 31–37)
MCV RBC AUTO: 85.7 FL (ref 74–97)
MONOCYTES # BLD: 0.7 K/UL (ref 0–1)
MONOCYTES NFR BLD: 8 % (ref 2–9)
NEUTS SEG # BLD: 4.7 K/UL (ref 1.8–8)
NEUTS SEG NFR BLD: 55 % (ref 42–75)
PLATELET # BLD AUTO: 124 K/UL (ref 135–420)
PLATELET COMMENTS,PCOM: ABNORMAL
POTASSIUM SERPL-SCNC: 4.9 MMOL/L (ref 3.5–5.5)
PROT SERPL-MCNC: 7.7 G/DL (ref 6.4–8.2)
Q-T INTERVAL, ECG07: 424 MS
QRS DURATION, ECG06: 102 MS
QTC CALCULATION (BEZET), ECG08: 405 MS
RBC # BLD AUTO: 4.63 M/UL (ref 4.7–5.5)
RBC MORPH BLD: ABNORMAL
SODIUM SERPL-SCNC: 142 MMOL/L (ref 136–145)
TROPONIN I SERPL-MCNC: 0.02 NG/ML (ref 0–0.04)
VENTRICULAR RATE, ECG03: 55 BPM
WBC # BLD AUTO: 8.6 K/UL (ref 4.6–13.2)

## 2020-11-26 PROCEDURE — 85025 COMPLETE CBC W/AUTO DIFF WBC: CPT

## 2020-11-26 PROCEDURE — 80053 COMPREHEN METABOLIC PANEL: CPT

## 2020-11-26 PROCEDURE — 99285 EMERGENCY DEPT VISIT HI MDM: CPT

## 2020-11-26 PROCEDURE — 94762 N-INVAS EAR/PLS OXIMTRY CONT: CPT

## 2020-11-26 PROCEDURE — 93005 ELECTROCARDIOGRAM TRACING: CPT

## 2020-11-26 PROCEDURE — 70450 CT HEAD/BRAIN W/O DYE: CPT

## 2020-11-26 PROCEDURE — 71045 X-RAY EXAM CHEST 1 VIEW: CPT

## 2020-11-26 PROCEDURE — 82550 ASSAY OF CK (CPK): CPT

## 2020-11-26 NOTE — ED NOTES
Bedside shift change report given to Dina Ulloa (oncoming nurse) by Ventura Westbrook (offgoing nurse). Report included the following information SBAR, ED Summary, Alarm Parameters  and Quality Measures. Pt laying in bed in no acute distress. Provider at bedside.

## 2020-11-26 NOTE — ED TRIAGE NOTES
Patient coming from home after wife heard him fall and found him unresponsive. With EMS the patient started to regain consciousness. FSBS was 135. Patient's wife states he has a history of heroin use (snorting), Stroke, and AFib.

## 2020-11-26 NOTE — ED PROVIDER NOTES
EMERGENCY DEPARTMENT HISTORY AND PHYSICAL EXAM  This was created with voice recognition software and transcription errors may be present. 8:17 AM  Date: 11/26/2020  Patient Name: Les Arboleda    History of Presenting Illness     Chief Complaint:    History Provided By:     HPI: Les Arboleda is a 63 y.o. male past medical history of A. fib diastolic heart failure hypertension shortness of breath who presents with altered mental status. Patient was reportedly found unresponsive by his wife with EMS the patient began to regain consciousness. His blood glucose was 135 on site patient states he is on anticoagulation for his A. fib. States he feels fine. He had no chest pain no headache no shortness of breath no abdominal pain no numbness no tingling no weakness no focal weakness no focal neurologic signs no blurred vision no slurred speech no pain of any kind. He does concede to using heroin prior to the event  PCP: Ayesha Akhtar MD      Past History     Past Medical History:  Past Medical History:   Diagnosis Date    Atrial fibrillation ([address] 05/06/2015    Persistent    Diastolic HF (heart failure) (HCC)     EF 45-50% on echocardiogram 2/2018    History of echocardiogram 02/06/2018    LVE. EF 45-50%. No WMA. Mod-severe conc LVH. Indeterminate diastolic fx. RVSP at least 45 mmHg. Severe LAE. Mod ROSEMARIE. Mod MR.    Mod TR. mild to moderate AI    Hypertension     Severe headache     SOB (shortness of breath) on exertion     Stomach pain     Tobacco use        Past Surgical History:  Past Surgical History:   Procedure Laterality Date    HX HERNIA REPAIR      HX HIP REPLACEMENT Bilateral     right in March 2013, left May 2013       Family History:  Family History   Problem Relation Age of Onset    Heart Failure Mother     Obesity Mother     Diabetes Father        Social History:  Social History     Tobacco Use    Smoking status: Current Every Day Smoker     Packs/day: 0.50     Years: 40.00     Pack years: 20.00    Smokeless tobacco: Never Used    Tobacco comment: trying to quit   Substance Use Topics    Alcohol use: No    Drug use: Yes     Types: Marijuana     Comment: daily       Allergies:  No Known Allergies    Review of Systems     Review of Systems   All other systems reviewed and are negative. 10 point review of systems otherwise negative unless noted in HPI. Physical Exam       Physical Exam  Constitutional:       Appearance: He is well-developed. HENT:      Head: Normocephalic and atraumatic. Eyes:      Pupils: Pupils are equal, round, and reactive to light. Neck:      Musculoskeletal: Normal range of motion and neck supple. Cardiovascular:      Rate and Rhythm: Normal rate and regular rhythm. Heart sounds: Normal heart sounds. No murmur. No friction rub. Pulmonary:      Effort: Pulmonary effort is normal. No respiratory distress. Breath sounds: Normal breath sounds. No wheezing. Abdominal:      General: There is no distension. Palpations: Abdomen is soft. Tenderness: There is no abdominal tenderness. There is no guarding or rebound. Musculoskeletal: Normal range of motion. Skin:     General: Skin is warm and dry. Neurological:      Mental Status: He is alert and oriented to person, place, and time. Psychiatric:         Behavior: Behavior normal.         Thought Content:  Thought content normal.         Diagnostic Study Results     Vital Signs  EKG: EKG shows A. fib at 64 with a normal axis and normal intervals there is no ST elevation or depression no hypertrophy he is known to have A. fib and on Xarelto  Labs: CBC is normal chemistry is normal except for slight DAYRON creatinine baseline around 1.1 and today is 1.8 troponin is negative I do not think I need to repeat  Imaging: He had his negative limitations of that especially for TIA are noted chest x-ray notes cardiomegaly with chronic pulmonary congestion    Medical Decision Making ED Course: Progress Notes, Reevaluation, and Consults:      Provider Notes (Medical Decision Making): This is a 58-year-old gentleman who presents with altered mental status he was unresponsive at home he did deny drinking however he does concede that he used heroin prior to the events. I did consider that he is at high risk for TIAs given his history of A. fib. He currently has no complaints whatsoever so any neurologic symptoms would have resolved I also considered syncope however he has no chest pain no shortness of breath no nausea no vomiting no abdominal pain. Based on this I think there is a good chance that the patient likely had excess heroin. He currently has no complaints will observe in the emergency department for short period and likely discharge I do not think this was a TIA I do not think this was syncope I do not think this was a seizure will refer him back for outpatient follow-up with his primary care doctor         Diagnosis     Clinical Impression: No diagnosis found. Disposition:    Patient's Medications   Start Taking    No medications on file   Continue Taking    ATORVASTATIN (LIPITOR) 40 MG TABLET    Take 1 Tab by mouth nightly. CARVEDILOL (COREG) 25 MG TABLET    Take 1 Tab by mouth every twelve (12) hours. FUROSEMIDE (LASIX) 40 MG TABLET    Take 1 Tab by mouth daily. Or as directed    LISINOPRIL (PRINIVIL, ZESTRIL) 20 MG TABLET    Take 1 Tab by mouth daily. METHOCARBAMOL (ROBAXIN) 500 MG TABLET    Take 1 Tab by mouth four (4) times daily. NITROGLYCERIN (NITROSTAT) 0.4 MG SL TABLET    1 Tab by SubLINGual route every five (5) minutes as needed for Chest Pain. RIVAROXABAN (XARELTO) 20 MG TAB TABLET    Take 1 Tab by mouth daily.    These Medications have changed    No medications on file   Stop Taking    No medications on file

## 2021-03-17 ENCOUNTER — HOSPITAL ENCOUNTER (EMERGENCY)
Age: 61
Discharge: HOME | End: 2021-03-17
Attending: EMERGENCY MEDICINE
Payer: MEDICARE

## 2021-03-17 VITALS
WEIGHT: 230 LBS | HEIGHT: 71 IN | DIASTOLIC BLOOD PRESSURE: 99 MMHG | RESPIRATION RATE: 20 BRPM | TEMPERATURE: 97.8 F | SYSTOLIC BLOOD PRESSURE: 124 MMHG | HEART RATE: 54 BPM | OXYGEN SATURATION: 100 % | BODY MASS INDEX: 32.2 KG/M2

## 2021-03-17 DIAGNOSIS — R09.2 RESPIRATORY ARREST (HCC): Primary | ICD-10-CM

## 2021-03-17 DIAGNOSIS — T40.601A OPIATE OVERDOSE, ACCIDENTAL OR UNINTENTIONAL, INITIAL ENCOUNTER (HCC): ICD-10-CM

## 2021-03-17 LAB
ALBUMIN SERPL-MCNC: 3.9 G/DL (ref 3.4–5)
ALBUMIN/GLOB SERPL: 0.9 {RATIO} (ref 0.8–1.7)
ALP SERPL-CCNC: 103 U/L (ref 45–117)
ALT SERPL-CCNC: 32 U/L (ref 16–61)
ANION GAP SERPL CALC-SCNC: 3 MMOL/L (ref 3–18)
AST SERPL-CCNC: 17 U/L (ref 10–38)
BASOPHILS # BLD: 0 K/UL (ref 0–0.1)
BASOPHILS NFR BLD: 0 % (ref 0–2)
BILIRUB SERPL-MCNC: 0.5 MG/DL (ref 0.2–1)
BUN SERPL-MCNC: 24 MG/DL (ref 7–18)
BUN/CREAT SERPL: 19 (ref 12–20)
CALCIUM SERPL-MCNC: 9.9 MG/DL (ref 8.5–10.1)
CHLORIDE SERPL-SCNC: 112 MMOL/L (ref 100–111)
CO2 SERPL-SCNC: 27 MMOL/L (ref 21–32)
CREAT SERPL-MCNC: 1.27 MG/DL (ref 0.6–1.3)
DIFFERENTIAL METHOD BLD: ABNORMAL
EOSINOPHIL # BLD: 0.2 K/UL (ref 0–0.4)
EOSINOPHIL NFR BLD: 3 % (ref 0–5)
ERYTHROCYTE [DISTWIDTH] IN BLOOD BY AUTOMATED COUNT: 15.7 % (ref 11.6–14.5)
GLOBULIN SER CALC-MCNC: 4.2 G/DL (ref 2–4)
GLUCOSE SERPL-MCNC: 111 MG/DL (ref 74–99)
HCT VFR BLD AUTO: 41.5 % (ref 36–48)
HGB BLD-MCNC: 13.9 G/DL (ref 13–16)
LYMPHOCYTES # BLD: 1.9 K/UL (ref 0.9–3.6)
LYMPHOCYTES NFR BLD: 29 % (ref 21–52)
MAGNESIUM SERPL-MCNC: 2.3 MG/DL (ref 1.6–2.6)
MCH RBC QN AUTO: 29.1 PG (ref 24–34)
MCHC RBC AUTO-ENTMCNC: 33.5 G/DL (ref 31–37)
MCV RBC AUTO: 86.8 FL (ref 74–97)
MONOCYTES # BLD: 0.4 K/UL (ref 0.05–1.2)
MONOCYTES NFR BLD: 6 % (ref 3–10)
NEUTS SEG # BLD: 4.2 K/UL (ref 1.8–8)
NEUTS SEG NFR BLD: 62 % (ref 40–73)
PLATELET # BLD AUTO: 126 K/UL (ref 135–420)
PMV BLD AUTO: 13.7 FL (ref 9.2–11.8)
POTASSIUM SERPL-SCNC: 4 MMOL/L (ref 3.5–5.5)
PROT SERPL-MCNC: 8.1 G/DL (ref 6.4–8.2)
RBC # BLD AUTO: 4.78 M/UL (ref 4.7–5.5)
SODIUM SERPL-SCNC: 142 MMOL/L (ref 136–145)
WBC # BLD AUTO: 6.7 K/UL (ref 4.6–13.2)

## 2021-03-17 PROCEDURE — 80053 COMPREHEN METABOLIC PANEL: CPT

## 2021-03-17 PROCEDURE — 99284 EMERGENCY DEPT VISIT MOD MDM: CPT

## 2021-03-17 PROCEDURE — 83735 ASSAY OF MAGNESIUM: CPT

## 2021-03-17 PROCEDURE — 85025 COMPLETE CBC W/AUTO DIFF WBC: CPT

## 2021-03-17 RX ORDER — NALOXONE HYDROCHLORIDE 4 MG/.1ML
SPRAY NASAL
Qty: 1 EACH | Refills: 0 | Status: SHIPPED | OUTPATIENT
Start: 2021-03-17 | End: 2021-05-01 | Stop reason: SDUPTHER
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
## 2021-05-01 ENCOUNTER — HOSPITAL ENCOUNTER (EMERGENCY)
Age: 61
Discharge: HOME | End: 2021-05-01
Attending: EMERGENCY MEDICINE
Payer: MEDICARE

## 2021-05-01 VITALS
SYSTOLIC BLOOD PRESSURE: 110 MMHG | OXYGEN SATURATION: 97 % | BODY MASS INDEX: 32.2 KG/M2 | HEIGHT: 71 IN | RESPIRATION RATE: 12 BRPM | TEMPERATURE: 98 F | WEIGHT: 230 LBS | DIASTOLIC BLOOD PRESSURE: 70 MMHG | HEART RATE: 70 BPM

## 2021-05-01 DIAGNOSIS — T50.901A ACCIDENTAL DRUG OVERDOSE, INITIAL ENCOUNTER: Primary | ICD-10-CM

## 2021-05-01 PROCEDURE — 99282 EMERGENCY DEPT VISIT SF MDM: CPT

## 2021-05-01 RX ORDER — NALOXONE HYDROCHLORIDE 4 MG/.1ML
SPRAY NASAL
Qty: 1 EACH | Refills: 0 | Status: SHIPPED | OUTPATIENT
Start: 2021-05-01 | End: 2022-11-03 | Stop reason: SDUPTHER
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
## 2021-05-02 NOTE — ED PROVIDER NOTES
EMERGENCY DEPARTMENT HISTORY AND PHYSICAL EXAM    10:52 PM      Date: 5/1/2021  Patient Name: Regine Santos    History of Presenting Illness     Chief Complaint   Patient presents with    Drug Overdose     History Provided By: Patient, EMS     Additional History (Context): Regine Santos is a 63 y.o. male with hx of CHF, atrial fibrillation, drug abuse, and other noted PMH who presents via EMS. Pt was found apneic, unresponsive. 4 mg IV Narcan administered via EMS, pt became responsive. Pt admits to snorting heroin and drinking alcohol tonight. Denies dizziness, headache, changes in vision, chest pain, shortness of breath, abdominal pain, nausea or vomiting. Denies SI. Denies any concerns at this time. PCP: Other, MD Ana Laura    Current Outpatient Medications   Medication Sig Dispense Refill    naloxone (Narcan) 4 mg/actuation nasal spray Use 1 spray intranasally, then discard. Repeat with new spray every 2 min as needed for opioid overdose symptoms, alternating nostrils. 1 Each 0    rivaroxaban (XARELTO) 20 mg tab tablet Take 1 Tab by mouth daily. 30 Tab 0    carvedilol (COREG) 25 mg tablet Take 1 Tab by mouth every twelve (12) hours. 60 Tab 6    furosemide (LASIX) 40 mg tablet Take 1 Tab by mouth daily. Or as directed 30 Tab 6    lisinopril (PRINIVIL, ZESTRIL) 20 mg tablet Take 1 Tab by mouth daily. 30 Tab 6    atorvastatin (LIPITOR) 40 mg tablet Take 1 Tab by mouth nightly. 30 Tab 6    nitroglycerin (NITROSTAT) 0.4 mg SL tablet 1 Tab by SubLINGual route every five (5) minutes as needed for Chest Pain. 1 Bottle 1       Past History     Past Medical History:  Past Medical History:   Diagnosis Date    Atrial fibrillation ([facility] [address] 05/06/2015    Persistent    Diastolic HF (heart failure) (HCC)     EF 45-50% on echocardiogram 2/2018    History of echocardiogram 02/06/2018    LVE. EF 45-50%. No WMA. Mod-severe conc LVH. Indeterminate diastolic fx. RVSP at least 45 mmHg. Severe LAE. Mod ROSEMARIE. Mod MR. Mod TR. mild to moderate AI    Hypertension     Severe headache     SOB (shortness of breath) on exertion     Stomach pain     Tobacco use        Past Surgical History:  Past Surgical History:   Procedure Laterality Date    HX HERNIA REPAIR      HX HIP REPLACEMENT Bilateral     right in March 2013, left May 2013       Family History:  Family History   Problem Relation Age of Onset    Heart Failure Mother     Obesity Mother     Diabetes Father        Social History:  Social History     Tobacco Use    Smoking status: Current Every Day Smoker     Packs/day: 0.50     Years: 40.00     Pack years: 20.00    Smokeless tobacco: Never Used    Tobacco comment: trying to quit   Substance Use Topics    Alcohol use: No    Drug use: Yes     Types: Marijuana     Comment: daily       Allergies:  No Known Allergies      Review of Systems       Review of Systems   Constitutional: Negative for chills and fever. Respiratory: Negative for shortness of breath. Cardiovascular: Negative for chest pain. Gastrointestinal: Negative for abdominal pain, nausea and vomiting. Skin: Negative for rash. Neurological: Negative for weakness. All other systems reviewed and are negative. Physical Exam     Visit Vitals  BP (!) 106/57 (BP 1 Location: Right upper arm, BP Patient Position: Supine)   Pulse 70   Temp 98 °F (36.7 °C)   Resp 12   Ht 5' 11\" (1.803 m)   Wt 104.3 kg (230 lb)   SpO2 94%   BMI 32.08 kg/m²         Physical Exam  Vitals signs and nursing note reviewed. Constitutional:       General: He is not in acute distress. Appearance: Normal appearance. He is well-developed. He is not ill-appearing, toxic-appearing or diaphoretic. HENT:      Head: Normocephalic and atraumatic. Eyes:      Comments: Pinpoint pupils     Neck:      Musculoskeletal: Normal range of motion and neck supple. Cardiovascular:      Rate and Rhythm: Normal rate and regular rhythm. Heart sounds: Normal heart sounds. No murmur. No friction rub. No gallop. Pulmonary:      Effort: Pulmonary effort is normal. No respiratory distress. Breath sounds: Normal breath sounds. No wheezing or rales. Abdominal:      General: Abdomen is flat. There is no distension. Palpations: Abdomen is soft. Tenderness: There is no abdominal tenderness. There is no guarding. Comments: Midline scar   Musculoskeletal: Normal range of motion. Skin:     General: Skin is warm. Findings: No rash. Neurological:      General: No focal deficit present. Mental Status: He is alert and oriented to person, place, and time. Cranial Nerves: No cranial nerve deficit. Sensory: No sensory deficit. Motor: No weakness. Coordination: Coordination normal.           Diagnostic Study Results     Labs -  No results found for this or any previous visit (from the past 12 hour(s)). Radiologic Studies -   No orders to display         Medical Decision Making   I am the first provider for this patient. I reviewed the vital signs, available nursing notes, past medical history, past surgical history, family history and social history. Vital Signs-Reviewed the patient's vital signs. Records Reviewed: Nursing Notes and Old Medical Records (Time of Review: 10:52 PM)    ED Course: Progress Notes, Reevaluation, and Consults:  8:30 PM: Pt sleeping, easily arousable. 9:30 PM: Pt resting comfortably. 10:54 PM: Pt ambulatory to the restroom, tolerating PO. Calling friend for ride. Reviewed plans with patient. Discussed need for close outpatient follow-up this week for reassessment. Discussed strict return precautions, including vomiting, weakness, or any other medical concerns. Provider Notes (Medical Decision Making): 56-year-old male who presents to the ED after unintentional heroin overdose. Narcan administered via EMS. Patient alert and oriented x3. Denies any complaints. Denies SI.   Observed in the ED for 4.5 hours. Ambulatory, tolerating p.o. Stable for discharge with Narcan prescription, close outpatient follow-up for further assessment. Diagnosis     Clinical Impression:   1. Accidental drug overdose, initial encounter        Disposition: home     Follow-up Information     Follow up With Specialties Details Why [address]    [facility] EMERGENCY DEPT Emergency Medicine  If symptoms worsen [address] [facility] [address]  [phone]           Current Discharge Medication List      CONTINUE these medications which have CHANGED    Details   naloxone (Narcan) 4 mg/actuation nasal spray Use 1 spray intranasally, then discard. Repeat with new spray every 2 min as needed for opioid overdose symptoms, alternating nostrils. Qty: 1 Each, Refills: 0         CONTINUE these medications which have NOT CHANGED    Details   rivaroxaban (XARELTO) 20 mg tab tablet Take 1 Tab by mouth daily. Qty: 30 Tab, Refills: 0      carvedilol (COREG) 25 mg tablet Take 1 Tab by mouth every twelve (12) hours. Qty: 60 Tab, Refills: 6      furosemide (LASIX) 40 mg tablet Take 1 Tab by mouth daily. Or as directed  Qty: 30 Tab, Refills: 6      lisinopril (PRINIVIL, ZESTRIL) 20 mg tablet Take 1 Tab by mouth daily. Qty: 30 Tab, Refills: 6      atorvastatin (LIPITOR) 40 mg tablet Take 1 Tab by mouth nightly. Qty: 30 Tab, Refills: 6      nitroglycerin (NITROSTAT) 0.4 mg SL tablet 1 Tab by SubLINGual route every five (5) minutes as needed for Chest Pain. Qty: 1 Bottle, Refills: 1         STOP taking these medications       methocarbamol (ROBAXIN) 500 mg tablet Comments:   Reason for Stopping:               Dictation disclaimer:  Please note that this dictation was completed with ALTHIA, the Variable voice recognition software. Quite often unanticipated grammatical, syntax, homophones, and other interpretive errors are inadvertently transcribed by the computer software. Please disregard these errors.   Please excuse any errors that have escaped final proofreading.

## 2025-05-14 NOTE — DISCHARGE INSTRUCTIONS
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 LYMPH NODE DISSECTION    Specimen(s):  ID Type Source Tests Collected by Time Destination   1 : pelvic washings for cytology Washing Pelvic Washing NON-GYNECOLOGIC CYTOLOGY Marbin Santacruz MD 2025 1452    2 : skip-aortic lymph node Tissue Lymph Node TISSUE EXAM Marbin Santacruz MD 2025 1805    3 : left kidney, ureter, and bladder cuff Tissue Kidney, Left TISSUE EXAM Marbin Santacruz MD 2025 1818        Estimated Blood Loss:   300 mL    Drains:  Closed/Suction Drain LLQ Bulb 19 Fr. (Active)   Number of days: 0       Urethral Catheter 24 Fr. (Active)   Number of days: 0       Anesthesia Type:   General    Operative Indications:  With gross hematuria found to be secondary to a large tumor in the left kidney biopsy-proven to be high-grade urothelial cancer.  As the patient was going to have hematuria associated blood loss he was urgently scheduled for robotic nephroureterectomy.  The night before surgery he had hypotension with worsening of his acute blood loss anemia requiring blood transfusion.    Operative Findings:  Patient starting hemoglobin before surgery was 6.5 and therefore decision made for transfusion before surgery started.  Patient also required norepinephrine from an induction of anesthesia.  Abnormal ascites fluid seen within the abdomen collected for cytology  Left kidney and ureter and bladder cuff excised  Limited para-aortic  lymph node dissection performed.  Good hemostasis at the end of the procedure.      Complications:   None    Procedure and Technique:  The patient was brought to the operating room and general anesthesia was induced.  The patient was then placed in a modified right lateral decubitus position with right leg in stirrup and left leg bent at comfortable angle and resting on the bed.     The pt was prepped and draped using standard sterile technique (to include their genitals) after being secured to the surgical table with surgical towels and tape.  All pressure points were carefully padded and there was no undue pressure on the muscle belly, nerve structure, or bony prominence.  Antibiotics were administered, and thromboembolism prophylaxis was given as per the guidelines.  A surgical time out was performed with all in the room in agreement with the correct patient, procedure, indications, and laterality.  Given the patient starting hemoglobin of 6.5 checked anesthesia decision was made for blood transfusion.  Of note the patient also required vasopressor agent after induction of anesthesia.    Towel clamps were used to elevate the skin 2cm superior and left lateral to the umbilicius and a Veress needle was passed.  Good positioning was confirmed with the saline drip test then pneumoperitoneum was obtained to 15 mmHg.  Proposed four robotic incisions were marked out angling laterally below the rib line to the midline with most caudal port and assistant port in the midline above umbilicus.    A 8 mm axially dilating trochar was then passed and the robotic camera was inserted and the viscera examined.  No evidence of adjacent organ injury was noted.  Two additional robotic 8mm ports and a 12 mm robotic port were identified and each was anesthetized with under direct vision to perform a block between the transversalis and internal oblique layers, incision created sharply and port placed under vision.   The proprosed 12 mm  port site between umbilicus and the xiphoid process in the midline  was infiltrated with exparel local anesthetic and then the AirSeal 12mm assistant port was placed under vision.    There was lightly brown ascites appreciated within the abdomen which was suctioned and sent for cytology.    Attention was turn to the left colon which was mobilized along the line of Toldt using a combination of sharp and blunt dissection.  The ureter and gonadal vein were identified and dissected free of the kidney.  The ureter was followed distally and dissected circumferentially with the aid of a vessel sealer.  Attention was turned back to the renal hilum which was identified and skeletonized to allow for accurate ligation of the renal vasculature.        Given the anatomy of the renal artery x 2 and renal vein it was thought safer to stay staple these vessels seperately.  A 45 mm vascular load stapler was used to first staple each of the renal artery branches with good effect and then another load on the vein also with good effect.    The remainder of the kidney was then dissected free of surrounding attachments with a combination of cautery and blunt dissection.  Care was taken to avoid inadvertent injury to the patient's very large pancreas which was draping over the kidney as well as spleen and sidewall in the process.  The adrenal gland was spared.    With this the kidney was freed of all attachments.    Attention now turned back to the ureter which was dissected down to the bladder with careful use of LigaSure and then scissors.  A Weck clip was placed on the distal ureter with stent inside.     The bladder was then incised at the 12 o'clock location of the ureter and a 3-0 V loc suture was placed in the bladder as a stay stitch.  The urothelium around the ureteral orifice was carefully excised under vision to ensure that the bladder cuff was incorporated into the resection.  The stent was pulled out of the bladder.  An  additional 3-0 V loc suture was placed at the 6 o'clock location.  The remainder of the bladder cuff was excised.     Attention was turned to closure of the bladder wall defect.  Each V lock suture was used in running fashion to close the defect created and the 2 sutures were tied to each other when met in the middle of closure.  The bladder was then filled via catheter with water to a volume of 150 cc with no leak appreciated.    Attention was turned back to the the patient's hilum and a limited no dissection was performed.  This was challenging because the patient's tissue made visualization difficult.  The lymphatic bed was controlled with numerous Weck clips and the bipolar energy the surgical bed was carefully examined with the pressure turned down to 5 mmHg and no significant bleeding was seen including from the hilum.  There was mild bleeding from the lymph node dissection area which was addressed with bipolar energy with good effect.  Surgicel was also placed over this area    The kidney and ureter were placed in a 15mmm Endocatch bag.      A 19fr edilberto drain was placed through a lateral robotic port.    The robotic arms were then undocked and the pneumoperitoneum deflated.  Ports were removed.  The assistant port was extended to approximately 4 cm and the rectus sheath was incised along a similar length.  The mass was then removed intact within the Endocatch bag and sent for pathology.      The 12 mm robot port site was closed with 0 Vicryl suture.  The extraction site incision fascia was closed with two running 0 Vicryl suture.  Subcutaneous fat was closed with interrupted 2-0 Vicryl suture and the skin with subcuticular 4-0 Monocryl.  All other port sites were closed at the skin only using subcuticular 4-0 Monocryl and dermal glue.  Sterile dressings were then applied.  There were no complications.  All instrument and sponge counts were correct.     A qualified resident physician was not  available.    Patient Disposition:  PACU     Plan: catheter x 2 weeks             SIGNATURE: Marbin Santacruz MD  DATE: May 14, 2025  TIME: 6:49 PM